# Patient Record
Sex: MALE | Race: WHITE | NOT HISPANIC OR LATINO | Employment: FULL TIME | URBAN - METROPOLITAN AREA
[De-identification: names, ages, dates, MRNs, and addresses within clinical notes are randomized per-mention and may not be internally consistent; named-entity substitution may affect disease eponyms.]

---

## 2017-08-22 ENCOUNTER — OFFICE VISIT (OUTPATIENT)
Dept: ONCOLOGY | Facility: CLINIC | Age: 52
End: 2017-08-22

## 2017-08-22 ENCOUNTER — APPOINTMENT (OUTPATIENT)
Dept: LAB | Facility: HOSPITAL | Age: 52
End: 2017-08-22

## 2017-08-22 VITALS
RESPIRATION RATE: 16 BRPM | WEIGHT: 171 LBS | BODY MASS INDEX: 25.33 KG/M2 | DIASTOLIC BLOOD PRESSURE: 80 MMHG | SYSTOLIC BLOOD PRESSURE: 124 MMHG | TEMPERATURE: 97.9 F | HEIGHT: 69 IN | HEART RATE: 70 BPM

## 2017-08-22 DIAGNOSIS — C21.0 SQUAMOUS CELL CANCER, ANUS (HCC): Primary | ICD-10-CM

## 2017-08-22 LAB
ALBUMIN SERPL-MCNC: 4.5 G/DL (ref 3.2–4.8)
ALBUMIN/GLOB SERPL: 1.8 G/DL (ref 1.5–2.5)
ALP SERPL-CCNC: 88 U/L (ref 25–100)
ALT SERPL W P-5'-P-CCNC: 10 U/L (ref 7–40)
ANION GAP SERPL CALCULATED.3IONS-SCNC: 11 MMOL/L (ref 3–11)
AST SERPL-CCNC: 16 U/L (ref 0–33)
BILIRUB SERPL-MCNC: 0.2 MG/DL (ref 0.3–1.2)
BUN BLD-MCNC: 13 MG/DL (ref 9–23)
BUN/CREAT SERPL: 13 (ref 7–25)
CALCIUM SPEC-SCNC: 9.5 MG/DL (ref 8.7–10.4)
CHLORIDE SERPL-SCNC: 111 MMOL/L (ref 99–109)
CO2 SERPL-SCNC: 19 MMOL/L (ref 20–31)
CREAT BLD-MCNC: 1 MG/DL (ref 0.6–1.3)
ERYTHROCYTE [DISTWIDTH] IN BLOOD BY AUTOMATED COUNT: 13.3 % (ref 11.3–14.5)
GFR SERPL CREATININE-BSD FRML MDRD: 78 ML/MIN/1.73
GLOBULIN UR ELPH-MCNC: 2.5 GM/DL
GLUCOSE BLD-MCNC: 82 MG/DL (ref 70–100)
HCT VFR BLD AUTO: 38.5 % (ref 38.9–50.9)
HGB BLD-MCNC: 12.6 G/DL (ref 13.1–17.5)
LYMPHOCYTES # BLD AUTO: 1.3 10*3/MM3 (ref 0.6–4.8)
LYMPHOCYTES NFR BLD AUTO: 27.3 % (ref 24–44)
MCH RBC QN AUTO: 29.8 PG (ref 27–31)
MCHC RBC AUTO-ENTMCNC: 32.7 G/DL (ref 32–36)
MCV RBC AUTO: 91.1 FL (ref 80–99)
MONOCYTES # BLD AUTO: 0.4 10*3/MM3 (ref 0–1)
MONOCYTES NFR BLD AUTO: 7.9 % (ref 0–12)
NEUTROPHILS # BLD AUTO: 3 10*3/MM3 (ref 1.5–8.3)
NEUTROPHILS NFR BLD AUTO: 64.8 % (ref 41–71)
PLATELET # BLD AUTO: 165 10*3/MM3 (ref 150–450)
PMV BLD AUTO: 7.6 FL (ref 6–12)
POTASSIUM BLD-SCNC: 3.7 MMOL/L (ref 3.5–5.5)
PROT SERPL-MCNC: 7 G/DL (ref 5.7–8.2)
RBC # BLD AUTO: 4.22 10*6/MM3 (ref 4.2–5.76)
SODIUM BLD-SCNC: 141 MMOL/L (ref 132–146)
WBC NRBC COR # BLD: 4.7 10*3/MM3 (ref 3.5–10.8)

## 2017-08-22 PROCEDURE — 80053 COMPREHEN METABOLIC PANEL: CPT | Performed by: INTERNAL MEDICINE

## 2017-08-22 PROCEDURE — 99213 OFFICE O/P EST LOW 20 MIN: CPT | Performed by: INTERNAL MEDICINE

## 2017-08-22 PROCEDURE — 36415 COLL VENOUS BLD VENIPUNCTURE: CPT | Performed by: INTERNAL MEDICINE

## 2017-08-22 PROCEDURE — 85025 COMPLETE CBC W/AUTO DIFF WBC: CPT | Performed by: INTERNAL MEDICINE

## 2017-08-22 NOTE — PROGRESS NOTES
"      PROBLEM LIST:  1. Squamous cell carcinoma of the anus:   a) The patient presented with 2 days of severe anal pain. He was found to have  a thrombosed hemorrhoid which was excised. At the time of followup he continued  to have a tender area as well as an ulcerated area and he had an exam under  anesthesia on 12/15/2014. A biopsy at that time showed a poorly differentiated  squamous cell carcinoma.   b) PET/CT on 01/02/2015 showed hypermetabolic mass at the anal verge but was  otherwise negative.   c) Chemoradiation with 5-FU and mitomycin was started on 01/08/2015.  Chemoradiation was completed on 02/13/2015.   2. Degenerative disc disease status post spine surgery.     Subjective     HISTORY OF PRESENT ILLNESS:   Arthur Recinos returns for follow-up.   Shortly after his last visit here he saw Dr. Mesfin rucker and had a procedure to remove some redundant tissue in the rectum.  Since then he has had some improvement in his symptoms although he still has very occasional bleeding or loose stools.  He has regained some weight.  Overall he is feeling well and has no new complaints or concerns.    Past Medical History, Past Surgical History, Social History, Family History have been reviewed and are without significant changes except as mentioned.    Review of Systems   A comprehensive 14 point review of systems was performed and was negative except as mentioned.    Medications:  The current medication list was reviewed in the EMR    ALLERGIES:    Allergies   Allergen Reactions   • Codeine Itching       Objective      /80  Pulse 70  Temp 97.9 °F (36.6 °C)  Resp 16  Ht 69\" (175.3 cm)  Wt 171 lb (77.6 kg)  BMI 25.25 kg/m2     Performance Status: 1    General: well appearing, in no acute distress  HEENT: sclera anicteric, oropharynx clear  Lymphatics: no cervical, supraclavicular, or axillary adenopathy  Cardiovascular: regular rate and rhythm, no murmurs  Lungs: clear to auscultation bilaterally  Abdomen: " soft, nontender, nondistended.  No palpable organomegaly  : External anal exam is unremarkable.  There is no ulceration or fissure.  Internal digital exam reveals no palpable mass.   Extremeties: no lower extremity edema  Skin: no rashes, lesions, bruising, or petechiae    RECENT LABS:  CBC and CMP today are unremarkable other than mild anemia with a hemoglobin of 12.6.          Assessment/Plan   Arthur Recinos is a 52 y.o. year old male with a history of stage II anal squamous cell carcinoma who completed chemoradiotherapy with a complete response.  He is doing well today with no evidence of disease recurrence.  I recommended that he follow-up with Dr. Toure at yearly for an exam.  I can see him yearly as well and we can space visits apart by about 6 months.    He has mild anemia which is likely residual from his chemotherapy.                  Visit time was 15 minutes, greater than 50% spent in counseling      Elizabeth Schulz MD  [facility]    8/22/2017          CC:

## 2017-08-26 ENCOUNTER — APPOINTMENT (OUTPATIENT)
Dept: CT IMAGING | Facility: HOSPITAL | Age: 52
End: 2017-08-26

## 2017-08-26 ENCOUNTER — HOSPITAL ENCOUNTER (INPATIENT)
Facility: HOSPITAL | Age: 52
LOS: 4 days | Discharge: HOME | End: 2017-08-30
Attending: EMERGENCY MEDICINE | Admitting: INTERNAL MEDICINE

## 2017-08-26 DIAGNOSIS — K56.60 INTESTINAL OBSTRUCTION, UNSPECIFIED TYPE: Primary | ICD-10-CM

## 2017-08-26 DIAGNOSIS — R10.9 INTRACTABLE ABDOMINAL PAIN: ICD-10-CM

## 2017-08-26 DIAGNOSIS — R10.84 GENERALIZED ABDOMINAL PAIN: ICD-10-CM

## 2017-08-26 PROBLEM — K56.609 SMALL BOWEL OBSTRUCTION: Status: ACTIVE | Noted: 2017-08-26

## 2017-08-26 PROBLEM — K56.609 INTESTINAL OBSTRUCTION: Status: ACTIVE | Noted: 2017-08-26

## 2017-08-26 PROBLEM — K56.50 INTESTINAL ADHESIONS WITH OBSTRUCTION: Status: ACTIVE | Noted: 2017-08-26

## 2017-08-26 LAB
ALBUMIN SERPL-MCNC: 4.2 G/DL (ref 3.2–4.8)
ALBUMIN/GLOB SERPL: 1.5 G/DL (ref 1.5–2.5)
ALP SERPL-CCNC: 82 U/L (ref 25–100)
ALT SERPL W P-5'-P-CCNC: 9 U/L (ref 7–40)
ANION GAP SERPL CALCULATED.3IONS-SCNC: 1 MMOL/L (ref 3–11)
AST SERPL-CCNC: 16 U/L (ref 0–33)
BACTERIA UR QL AUTO: ABNORMAL /HPF
BASOPHILS # BLD AUTO: 0.01 10*3/MM3 (ref 0–0.2)
BASOPHILS NFR BLD AUTO: 0.1 % (ref 0–1)
BILIRUB SERPL-MCNC: 0.4 MG/DL (ref 0.3–1.2)
BILIRUB UR QL STRIP: NEGATIVE
BUN BLD-MCNC: 11 MG/DL (ref 9–23)
BUN/CREAT SERPL: 11 (ref 7–25)
CALCIUM SPEC-SCNC: 9.8 MG/DL (ref 8.7–10.4)
CHLORIDE SERPL-SCNC: 110 MMOL/L (ref 99–109)
CLARITY UR: CLEAR
CO2 SERPL-SCNC: 28 MMOL/L (ref 20–31)
COLOR UR: YELLOW
CREAT BLD-MCNC: 1 MG/DL (ref 0.6–1.3)
DEPRECATED RDW RBC AUTO: 43 FL (ref 37–54)
EOSINOPHIL # BLD AUTO: 0.08 10*3/MM3 (ref 0–0.3)
EOSINOPHIL NFR BLD AUTO: 1.2 % (ref 0–3)
ERYTHROCYTE [DISTWIDTH] IN BLOOD BY AUTOMATED COUNT: 13.2 % (ref 11.3–14.5)
GFR SERPL CREATININE-BSD FRML MDRD: 78 ML/MIN/1.73
GLOBULIN UR ELPH-MCNC: 2.8 GM/DL
GLUCOSE BLD-MCNC: 90 MG/DL (ref 70–100)
GLUCOSE UR STRIP-MCNC: NEGATIVE MG/DL
HCT VFR BLD AUTO: 38.8 % (ref 38.9–50.9)
HGB BLD-MCNC: 13.5 G/DL (ref 13.1–17.5)
HGB UR QL STRIP.AUTO: ABNORMAL
HYALINE CASTS UR QL AUTO: ABNORMAL /LPF
IMM GRANULOCYTES # BLD: 0.02 10*3/MM3 (ref 0–0.03)
IMM GRANULOCYTES NFR BLD: 0.3 % (ref 0–0.6)
KETONES UR QL STRIP: NEGATIVE
LEUKOCYTE ESTERASE UR QL STRIP.AUTO: NEGATIVE
LIPASE SERPL-CCNC: 33 U/L (ref 6–51)
LYMPHOCYTES # BLD AUTO: 1.25 10*3/MM3 (ref 0.6–4.8)
LYMPHOCYTES NFR BLD AUTO: 18.7 % (ref 24–44)
MCH RBC QN AUTO: 31 PG (ref 27–31)
MCHC RBC AUTO-ENTMCNC: 34.8 G/DL (ref 32–36)
MCV RBC AUTO: 89 FL (ref 80–99)
MONOCYTES # BLD AUTO: 0.66 10*3/MM3 (ref 0–1)
MONOCYTES NFR BLD AUTO: 9.9 % (ref 0–12)
NEUTROPHILS # BLD AUTO: 4.65 10*3/MM3 (ref 1.5–8.3)
NEUTROPHILS NFR BLD AUTO: 69.8 % (ref 41–71)
NITRITE UR QL STRIP: NEGATIVE
PH UR STRIP.AUTO: 6 [PH] (ref 5–8)
PLATELET # BLD AUTO: 154 10*3/MM3 (ref 150–450)
PMV BLD AUTO: 10 FL (ref 6–12)
POTASSIUM BLD-SCNC: 4 MMOL/L (ref 3.5–5.5)
PROT SERPL-MCNC: 7 G/DL (ref 5.7–8.2)
PROT UR QL STRIP: NEGATIVE
RBC # BLD AUTO: 4.36 10*6/MM3 (ref 4.2–5.76)
RBC # UR: ABNORMAL /HPF
REF LAB TEST METHOD: ABNORMAL
SODIUM BLD-SCNC: 139 MMOL/L (ref 132–146)
SP GR UR STRIP: 1.01 (ref 1–1.03)
SQUAMOUS #/AREA URNS HPF: ABNORMAL /HPF
UROBILINOGEN UR QL STRIP: ABNORMAL
WBC NRBC COR # BLD: 6.67 10*3/MM3 (ref 3.5–10.8)
WBC UR QL AUTO: ABNORMAL /HPF

## 2017-08-26 PROCEDURE — 80053 COMPREHEN METABOLIC PANEL: CPT | Performed by: EMERGENCY MEDICINE

## 2017-08-26 PROCEDURE — 25010000002 HYDROMORPHONE PER 4 MG: Performed by: NURSE PRACTITIONER

## 2017-08-26 PROCEDURE — 25010000002 HYDROMORPHONE PER 4 MG: Performed by: EMERGENCY MEDICINE

## 2017-08-26 PROCEDURE — 74177 CT ABD & PELVIS W/CONTRAST: CPT

## 2017-08-26 PROCEDURE — 85025 COMPLETE CBC W/AUTO DIFF WBC: CPT | Performed by: EMERGENCY MEDICINE

## 2017-08-26 PROCEDURE — 0 IOPAMIDOL 61 % SOLUTION: Performed by: EMERGENCY MEDICINE

## 2017-08-26 PROCEDURE — 25010000002 HEPARIN (PORCINE) PER 1000 UNITS: Performed by: NURSE PRACTITIONER

## 2017-08-26 PROCEDURE — 83690 ASSAY OF LIPASE: CPT | Performed by: EMERGENCY MEDICINE

## 2017-08-26 PROCEDURE — 99285 EMERGENCY DEPT VISIT HI MDM: CPT

## 2017-08-26 PROCEDURE — 81001 URINALYSIS AUTO W/SCOPE: CPT | Performed by: EMERGENCY MEDICINE

## 2017-08-26 PROCEDURE — 25010000002 ONDANSETRON PER 1 MG: Performed by: EMERGENCY MEDICINE

## 2017-08-26 PROCEDURE — 99223 1ST HOSP IP/OBS HIGH 75: CPT | Performed by: FAMILY MEDICINE

## 2017-08-26 PROCEDURE — 25010000002 ONDANSETRON PER 1 MG: Performed by: NURSE PRACTITIONER

## 2017-08-26 RX ORDER — SODIUM CHLORIDE 0.9 % (FLUSH) 0.9 %
1-10 SYRINGE (ML) INJECTION AS NEEDED
Status: DISCONTINUED | OUTPATIENT
Start: 2017-08-26 | End: 2017-08-28

## 2017-08-26 RX ORDER — NALOXONE HCL 0.4 MG/ML
0.4 VIAL (ML) INJECTION
Status: DISCONTINUED | OUTPATIENT
Start: 2017-08-26 | End: 2017-08-31 | Stop reason: HOSPADM

## 2017-08-26 RX ORDER — HYDROMORPHONE HYDROCHLORIDE 1 MG/ML
0.5 INJECTION, SOLUTION INTRAMUSCULAR; INTRAVENOUS; SUBCUTANEOUS ONCE
Status: COMPLETED | OUTPATIENT
Start: 2017-08-26 | End: 2017-08-26

## 2017-08-26 RX ORDER — HYDROMORPHONE HYDROCHLORIDE 1 MG/ML
0.5 INJECTION, SOLUTION INTRAMUSCULAR; INTRAVENOUS; SUBCUTANEOUS ONCE
Status: DISCONTINUED | OUTPATIENT
Start: 2017-08-26 | End: 2017-08-26

## 2017-08-26 RX ORDER — DOCUSATE SODIUM 100 MG/1
100 CAPSULE, LIQUID FILLED ORAL 2 TIMES DAILY
Status: DISCONTINUED | OUTPATIENT
Start: 2017-08-26 | End: 2017-08-27

## 2017-08-26 RX ORDER — FAMOTIDINE 20 MG/1
20 TABLET, FILM COATED ORAL 2 TIMES DAILY
Status: DISCONTINUED | OUTPATIENT
Start: 2017-08-26 | End: 2017-08-27

## 2017-08-26 RX ORDER — ONDANSETRON 2 MG/ML
4 INJECTION INTRAMUSCULAR; INTRAVENOUS ONCE
Status: COMPLETED | OUTPATIENT
Start: 2017-08-26 | End: 2017-08-26

## 2017-08-26 RX ORDER — SODIUM CHLORIDE 9 MG/ML
150 INJECTION, SOLUTION INTRAVENOUS CONTINUOUS
Status: DISCONTINUED | OUTPATIENT
Start: 2017-08-26 | End: 2017-08-27

## 2017-08-26 RX ORDER — ONDANSETRON 2 MG/ML
4 INJECTION INTRAMUSCULAR; INTRAVENOUS EVERY 6 HOURS PRN
Status: DISCONTINUED | OUTPATIENT
Start: 2017-08-26 | End: 2017-08-31 | Stop reason: HOSPADM

## 2017-08-26 RX ORDER — SODIUM CHLORIDE 0.9 % (FLUSH) 0.9 %
1-10 SYRINGE (ML) INJECTION AS NEEDED
Status: DISCONTINUED | OUTPATIENT
Start: 2017-08-26 | End: 2017-08-31 | Stop reason: HOSPADM

## 2017-08-26 RX ORDER — HYDROMORPHONE HYDROCHLORIDE 1 MG/ML
0.5 INJECTION, SOLUTION INTRAMUSCULAR; INTRAVENOUS; SUBCUTANEOUS
Status: DISCONTINUED | OUTPATIENT
Start: 2017-08-26 | End: 2017-08-31 | Stop reason: HOSPADM

## 2017-08-26 RX ORDER — SODIUM CHLORIDE 0.9 % (FLUSH) 0.9 %
10 SYRINGE (ML) INJECTION AS NEEDED
Status: DISCONTINUED | OUTPATIENT
Start: 2017-08-26 | End: 2017-08-31 | Stop reason: HOSPADM

## 2017-08-26 RX ORDER — ONDANSETRON 4 MG/1
4 TABLET, FILM COATED ORAL EVERY 6 HOURS PRN
Status: DISCONTINUED | OUTPATIENT
Start: 2017-08-26 | End: 2017-08-31 | Stop reason: HOSPADM

## 2017-08-26 RX ORDER — ACETAMINOPHEN 325 MG/1
650 TABLET ORAL EVERY 6 HOURS PRN
Status: DISCONTINUED | OUTPATIENT
Start: 2017-08-26 | End: 2017-08-31 | Stop reason: HOSPADM

## 2017-08-26 RX ORDER — ONDANSETRON 2 MG/ML
4 INJECTION INTRAMUSCULAR; INTRAVENOUS ONCE
Status: DISCONTINUED | OUTPATIENT
Start: 2017-08-26 | End: 2017-08-26

## 2017-08-26 RX ORDER — HEPARIN SODIUM 5000 [USP'U]/ML
5000 INJECTION, SOLUTION INTRAVENOUS; SUBCUTANEOUS EVERY 12 HOURS SCHEDULED
Status: DISCONTINUED | OUTPATIENT
Start: 2017-08-26 | End: 2017-08-31 | Stop reason: HOSPADM

## 2017-08-26 RX ADMIN — FAMOTIDINE 20 MG: 20 TABLET ORAL at 22:10

## 2017-08-26 RX ADMIN — ONDANSETRON 4 MG: 2 INJECTION INTRAMUSCULAR; INTRAVENOUS at 17:32

## 2017-08-26 RX ADMIN — HYDROMORPHONE HYDROCHLORIDE 0.5 MG: 1 INJECTION, SOLUTION INTRAMUSCULAR; INTRAVENOUS; SUBCUTANEOUS at 17:32

## 2017-08-26 RX ADMIN — DOCUSATE SODIUM 100 MG: 100 CAPSULE, LIQUID FILLED ORAL at 22:10

## 2017-08-26 RX ADMIN — HYDROMORPHONE HYDROCHLORIDE 0.5 MG: 1 INJECTION, SOLUTION INTRAMUSCULAR; INTRAVENOUS; SUBCUTANEOUS at 19:23

## 2017-08-26 RX ADMIN — ONDANSETRON 4 MG: 2 INJECTION INTRAMUSCULAR; INTRAVENOUS at 14:13

## 2017-08-26 RX ADMIN — ONDANSETRON 4 MG: 2 INJECTION INTRAMUSCULAR; INTRAVENOUS at 21:19

## 2017-08-26 RX ADMIN — HYDROMORPHONE HYDROCHLORIDE 0.5 MG: 1 INJECTION, SOLUTION INTRAMUSCULAR; INTRAVENOUS; SUBCUTANEOUS at 22:40

## 2017-08-26 RX ADMIN — HYDROMORPHONE HYDROCHLORIDE 0.5 MG: 1 INJECTION, SOLUTION INTRAMUSCULAR; INTRAVENOUS; SUBCUTANEOUS at 14:19

## 2017-08-26 RX ADMIN — HYDROMORPHONE HYDROCHLORIDE 1 MG: 1 INJECTION, SOLUTION INTRAMUSCULAR; INTRAVENOUS; SUBCUTANEOUS at 15:04

## 2017-08-26 RX ADMIN — SODIUM CHLORIDE 1000 ML: 9 INJECTION, SOLUTION INTRAVENOUS at 14:10

## 2017-08-26 RX ADMIN — SODIUM CHLORIDE 75 ML/HR: 9 INJECTION, SOLUTION INTRAVENOUS at 22:30

## 2017-08-26 RX ADMIN — IOPAMIDOL 95 ML: 612 INJECTION, SOLUTION INTRAVENOUS at 15:34

## 2017-08-26 RX ADMIN — ACETAMINOPHEN 650 MG: 325 TABLET, FILM COATED ORAL at 22:40

## 2017-08-26 RX ADMIN — HEPARIN SODIUM 5000 UNITS: 5000 INJECTION, SOLUTION INTRAVENOUS; SUBCUTANEOUS at 21:19

## 2017-08-27 ENCOUNTER — APPOINTMENT (OUTPATIENT)
Dept: CT IMAGING | Facility: HOSPITAL | Age: 52
End: 2017-08-27

## 2017-08-27 LAB
ALBUMIN SERPL-MCNC: 3.5 G/DL (ref 3.2–4.8)
ALBUMIN/GLOB SERPL: 1.5 G/DL (ref 1.5–2.5)
ALP SERPL-CCNC: 64 U/L (ref 25–100)
ALT SERPL W P-5'-P-CCNC: 6 U/L (ref 7–40)
ANION GAP SERPL CALCULATED.3IONS-SCNC: 0 MMOL/L (ref 3–11)
APTT PPP: 30.3 SECONDS (ref 24–31)
AST SERPL-CCNC: 15 U/L (ref 0–33)
BASOPHILS # BLD AUTO: 0.01 10*3/MM3 (ref 0–0.2)
BASOPHILS NFR BLD AUTO: 0.3 % (ref 0–1)
BILIRUB SERPL-MCNC: 0.4 MG/DL (ref 0.3–1.2)
BUN BLD-MCNC: 9 MG/DL (ref 9–23)
BUN/CREAT SERPL: 9 (ref 7–25)
CALCIUM SPEC-SCNC: 8.1 MG/DL (ref 8.7–10.4)
CHLORIDE SERPL-SCNC: 112 MMOL/L (ref 99–109)
CO2 SERPL-SCNC: 27 MMOL/L (ref 20–31)
CORTIS AM PEAK SERPL-MCNC: 10.1 MCG/DL (ref 4.3–22.4)
CREAT BLD-MCNC: 1 MG/DL (ref 0.6–1.3)
D-LACTATE SERPL-SCNC: 0.5 MMOL/L (ref 0.5–2)
D-LACTATE SERPL-SCNC: 0.6 MMOL/L (ref 0.5–2)
DEPRECATED RDW RBC AUTO: 44.6 FL (ref 37–54)
EOSINOPHIL # BLD AUTO: 0.08 10*3/MM3 (ref 0–0.3)
EOSINOPHIL NFR BLD AUTO: 2.4 % (ref 0–3)
ERYTHROCYTE [DISTWIDTH] IN BLOOD BY AUTOMATED COUNT: 13.5 % (ref 11.3–14.5)
GFR SERPL CREATININE-BSD FRML MDRD: 78 ML/MIN/1.73
GLOBULIN UR ELPH-MCNC: 2.3 GM/DL
GLUCOSE BLD-MCNC: 82 MG/DL (ref 70–100)
HCT VFR BLD AUTO: 34.2 % (ref 38.9–50.9)
HGB BLD-MCNC: 11.7 G/DL (ref 13.1–17.5)
IMM GRANULOCYTES # BLD: 0.01 10*3/MM3 (ref 0–0.03)
IMM GRANULOCYTES NFR BLD: 0.3 % (ref 0–0.6)
INR PPP: 1
LYMPHOCYTES # BLD AUTO: 1.01 10*3/MM3 (ref 0.6–4.8)
LYMPHOCYTES NFR BLD AUTO: 30.8 % (ref 24–44)
MAGNESIUM SERPL-MCNC: 1.9 MG/DL (ref 1.3–2.7)
MCH RBC QN AUTO: 31.1 PG (ref 27–31)
MCHC RBC AUTO-ENTMCNC: 34.2 G/DL (ref 32–36)
MCV RBC AUTO: 91 FL (ref 80–99)
MONOCYTES # BLD AUTO: 0.39 10*3/MM3 (ref 0–1)
MONOCYTES NFR BLD AUTO: 11.9 % (ref 0–12)
NEUTROPHILS # BLD AUTO: 1.78 10*3/MM3 (ref 1.5–8.3)
NEUTROPHILS NFR BLD AUTO: 54.3 % (ref 41–71)
PLATELET # BLD AUTO: 118 10*3/MM3 (ref 150–450)
PMV BLD AUTO: 10.3 FL (ref 6–12)
POTASSIUM BLD-SCNC: 3.9 MMOL/L (ref 3.5–5.5)
PROCALCITONIN SERPL-MCNC: <0.05 NG/ML
PROT SERPL-MCNC: 5.8 G/DL (ref 5.7–8.2)
PROTHROMBIN TIME: 10.9 SECONDS (ref 9.6–11.5)
RBC # BLD AUTO: 3.76 10*6/MM3 (ref 4.2–5.76)
SODIUM BLD-SCNC: 139 MMOL/L (ref 132–146)
TROPONIN I SERPL-MCNC: <0.006 NG/ML
WBC NRBC COR # BLD: 3.28 10*3/MM3 (ref 3.5–10.8)

## 2017-08-27 PROCEDURE — 25010000002 HEPARIN (PORCINE) PER 1000 UNITS: Performed by: NURSE PRACTITIONER

## 2017-08-27 PROCEDURE — 85610 PROTHROMBIN TIME: CPT | Performed by: INTERNAL MEDICINE

## 2017-08-27 PROCEDURE — 25010000002 ONDANSETRON PER 1 MG: Performed by: INTERNAL MEDICINE

## 2017-08-27 PROCEDURE — 85730 THROMBOPLASTIN TIME PARTIAL: CPT | Performed by: INTERNAL MEDICINE

## 2017-08-27 PROCEDURE — 83605 ASSAY OF LACTIC ACID: CPT | Performed by: NURSE PRACTITIONER

## 2017-08-27 PROCEDURE — 84145 PROCALCITONIN (PCT): CPT | Performed by: NURSE PRACTITIONER

## 2017-08-27 PROCEDURE — 80053 COMPREHEN METABOLIC PANEL: CPT | Performed by: INTERNAL MEDICINE

## 2017-08-27 PROCEDURE — 25010000002 DEXAMETHASONE PER 1 MG: Performed by: NURSE PRACTITIONER

## 2017-08-27 PROCEDURE — 85025 COMPLETE CBC W/AUTO DIFF WBC: CPT | Performed by: INTERNAL MEDICINE

## 2017-08-27 PROCEDURE — 99233 SBSQ HOSP IP/OBS HIGH 50: CPT | Performed by: INTERNAL MEDICINE

## 2017-08-27 PROCEDURE — 74176 CT ABD & PELVIS W/O CONTRAST: CPT

## 2017-08-27 PROCEDURE — 84484 ASSAY OF TROPONIN QUANT: CPT | Performed by: NURSE PRACTITIONER

## 2017-08-27 PROCEDURE — 82533 TOTAL CORTISOL: CPT | Performed by: NURSE PRACTITIONER

## 2017-08-27 PROCEDURE — 93005 ELECTROCARDIOGRAM TRACING: CPT | Performed by: NURSE PRACTITIONER

## 2017-08-27 PROCEDURE — 83735 ASSAY OF MAGNESIUM: CPT | Performed by: NURSE PRACTITIONER

## 2017-08-27 PROCEDURE — 25010000002 HYDROMORPHONE PER 4 MG: Performed by: NURSE PRACTITIONER

## 2017-08-27 PROCEDURE — 93010 ELECTROCARDIOGRAM REPORT: CPT | Performed by: INTERNAL MEDICINE

## 2017-08-27 PROCEDURE — 99291 CRITICAL CARE FIRST HOUR: CPT | Performed by: NURSE PRACTITIONER

## 2017-08-27 RX ORDER — DEXAMETHASONE SODIUM PHOSPHATE 4 MG/ML
4 INJECTION, SOLUTION INTRA-ARTICULAR; INTRALESIONAL; INTRAMUSCULAR; INTRAVENOUS; SOFT TISSUE ONCE
Status: COMPLETED | OUTPATIENT
Start: 2017-08-27 | End: 2017-08-27

## 2017-08-27 RX ORDER — FAMOTIDINE 10 MG/ML
20 INJECTION, SOLUTION INTRAVENOUS DAILY
Status: DISCONTINUED | OUTPATIENT
Start: 2017-08-28 | End: 2017-08-31 | Stop reason: HOSPADM

## 2017-08-27 RX ORDER — DEXTROSE, SODIUM CHLORIDE, AND POTASSIUM CHLORIDE 5; .45; .15 G/100ML; G/100ML; G/100ML
125 INJECTION INTRAVENOUS CONTINUOUS
Status: DISCONTINUED | OUTPATIENT
Start: 2017-08-27 | End: 2017-08-31 | Stop reason: HOSPADM

## 2017-08-27 RX ORDER — PROMETHAZINE HYDROCHLORIDE 25 MG/ML
12.5 INJECTION, SOLUTION INTRAMUSCULAR; INTRAVENOUS EVERY 6 HOURS PRN
Status: DISCONTINUED | OUTPATIENT
Start: 2017-08-27 | End: 2017-08-31 | Stop reason: HOSPADM

## 2017-08-27 RX ADMIN — SODIUM CHLORIDE 500 ML: 9 INJECTION, SOLUTION INTRAVENOUS at 21:47

## 2017-08-27 RX ADMIN — HEPARIN SODIUM 5000 UNITS: 5000 INJECTION, SOLUTION INTRAVENOUS; SUBCUTANEOUS at 21:08

## 2017-08-27 RX ADMIN — SODIUM CHLORIDE 75 ML/HR: 9 INJECTION, SOLUTION INTRAVENOUS at 03:19

## 2017-08-27 RX ADMIN — FAMOTIDINE 20 MG: 20 TABLET ORAL at 17:18

## 2017-08-27 RX ADMIN — FAMOTIDINE 20 MG: 20 TABLET ORAL at 07:52

## 2017-08-27 RX ADMIN — HEPARIN SODIUM 5000 UNITS: 5000 INJECTION, SOLUTION INTRAVENOUS; SUBCUTANEOUS at 07:52

## 2017-08-27 RX ADMIN — DOCUSATE SODIUM 100 MG: 100 CAPSULE, LIQUID FILLED ORAL at 17:18

## 2017-08-27 RX ADMIN — HYDROMORPHONE HYDROCHLORIDE 0.5 MG: 1 INJECTION, SOLUTION INTRAMUSCULAR; INTRAVENOUS; SUBCUTANEOUS at 07:52

## 2017-08-27 RX ADMIN — ONDANSETRON 4 MG: 2 INJECTION INTRAMUSCULAR; INTRAVENOUS at 16:43

## 2017-08-27 RX ADMIN — DEXAMETHASONE SODIUM PHOSPHATE 4 MG: 4 INJECTION, SOLUTION INTRA-ARTICULAR; INTRALESIONAL; INTRAMUSCULAR; INTRAVENOUS; SOFT TISSUE at 06:38

## 2017-08-27 RX ADMIN — ONDANSETRON 4 MG: 2 INJECTION INTRAMUSCULAR; INTRAVENOUS at 09:11

## 2017-08-27 RX ADMIN — SODIUM CHLORIDE 500 ML: 9 INJECTION, SOLUTION INTRAVENOUS at 07:54

## 2017-08-27 RX ADMIN — HYDROMORPHONE HYDROCHLORIDE 0.5 MG: 1 INJECTION, SOLUTION INTRAMUSCULAR; INTRAVENOUS; SUBCUTANEOUS at 19:29

## 2017-08-27 RX ADMIN — HYDROMORPHONE HYDROCHLORIDE 0.5 MG: 1 INJECTION, SOLUTION INTRAMUSCULAR; INTRAVENOUS; SUBCUTANEOUS at 17:18

## 2017-08-27 RX ADMIN — POTASSIUM CHLORIDE, DEXTROSE MONOHYDRATE AND SODIUM CHLORIDE 125 ML/HR: 150; 5; 450 INJECTION, SOLUTION INTRAVENOUS at 19:29

## 2017-08-27 RX ADMIN — HYDROMORPHONE HYDROCHLORIDE 0.5 MG: 1 INJECTION, SOLUTION INTRAMUSCULAR; INTRAVENOUS; SUBCUTANEOUS at 11:00

## 2017-08-27 RX ADMIN — HYDROMORPHONE HYDROCHLORIDE 0.5 MG: 1 INJECTION, SOLUTION INTRAMUSCULAR; INTRAVENOUS; SUBCUTANEOUS at 13:12

## 2017-08-27 RX ADMIN — SODIUM CHLORIDE 500 ML: 9 INJECTION, SOLUTION INTRAVENOUS at 03:00

## 2017-08-27 RX ADMIN — DOCUSATE SODIUM 100 MG: 100 CAPSULE, LIQUID FILLED ORAL at 07:52

## 2017-08-27 RX ADMIN — POTASSIUM CHLORIDE, DEXTROSE MONOHYDRATE AND SODIUM CHLORIDE 125 ML/HR: 150; 5; 450 INJECTION, SOLUTION INTRAVENOUS at 12:12

## 2017-08-28 ENCOUNTER — APPOINTMENT (OUTPATIENT)
Dept: GENERAL RADIOLOGY | Facility: HOSPITAL | Age: 52
End: 2017-08-28

## 2017-08-28 PROBLEM — R00.1 BRADYCARDIA: Status: ACTIVE | Noted: 2017-08-28

## 2017-08-28 LAB
ANION GAP SERPL CALCULATED.3IONS-SCNC: 4 MMOL/L (ref 3–11)
BASOPHILS # BLD AUTO: 0.01 10*3/MM3 (ref 0–0.2)
BASOPHILS NFR BLD AUTO: 0.2 % (ref 0–1)
BUN BLD-MCNC: 9 MG/DL (ref 9–23)
BUN/CREAT SERPL: 10 (ref 7–25)
CALCIUM SPEC-SCNC: 8.7 MG/DL (ref 8.7–10.4)
CHLORIDE SERPL-SCNC: 110 MMOL/L (ref 99–109)
CO2 SERPL-SCNC: 27 MMOL/L (ref 20–31)
CREAT BLD-MCNC: 0.9 MG/DL (ref 0.6–1.3)
DEPRECATED RDW RBC AUTO: 42.9 FL (ref 37–54)
EOSINOPHIL # BLD AUTO: 0.02 10*3/MM3 (ref 0–0.3)
EOSINOPHIL NFR BLD AUTO: 0.3 % (ref 0–3)
ERYTHROCYTE [DISTWIDTH] IN BLOOD BY AUTOMATED COUNT: 13.1 % (ref 11.3–14.5)
GFR SERPL CREATININE-BSD FRML MDRD: 89 ML/MIN/1.73
GLUCOSE BLD-MCNC: 84 MG/DL (ref 70–100)
HCT VFR BLD AUTO: 35.2 % (ref 38.9–50.9)
HGB BLD-MCNC: 12.1 G/DL (ref 13.1–17.5)
IMM GRANULOCYTES # BLD: 0.01 10*3/MM3 (ref 0–0.03)
IMM GRANULOCYTES NFR BLD: 0.2 % (ref 0–0.6)
LYMPHOCYTES # BLD AUTO: 1.05 10*3/MM3 (ref 0.6–4.8)
LYMPHOCYTES NFR BLD AUTO: 16.3 % (ref 24–44)
MCH RBC QN AUTO: 30.8 PG (ref 27–31)
MCHC RBC AUTO-ENTMCNC: 34.4 G/DL (ref 32–36)
MCV RBC AUTO: 89.6 FL (ref 80–99)
MONOCYTES # BLD AUTO: 0.62 10*3/MM3 (ref 0–1)
MONOCYTES NFR BLD AUTO: 9.6 % (ref 0–12)
NEUTROPHILS # BLD AUTO: 4.74 10*3/MM3 (ref 1.5–8.3)
NEUTROPHILS NFR BLD AUTO: 73.4 % (ref 41–71)
PLATELET # BLD AUTO: 128 10*3/MM3 (ref 150–450)
PMV BLD AUTO: 10.4 FL (ref 6–12)
POTASSIUM BLD-SCNC: 4.1 MMOL/L (ref 3.5–5.5)
RBC # BLD AUTO: 3.93 10*6/MM3 (ref 4.2–5.76)
SODIUM BLD-SCNC: 141 MMOL/L (ref 132–146)
TSH SERPL DL<=0.05 MIU/L-ACNC: 2.46 MIU/ML (ref 0.35–5.35)
WBC NRBC COR # BLD: 6.45 10*3/MM3 (ref 3.5–10.8)

## 2017-08-28 PROCEDURE — 85025 COMPLETE CBC W/AUTO DIFF WBC: CPT | Performed by: INTERNAL MEDICINE

## 2017-08-28 PROCEDURE — 74000 HC ABDOMEN KUB: CPT

## 2017-08-28 PROCEDURE — 25010000002 HEPARIN (PORCINE) PER 1000 UNITS: Performed by: NURSE PRACTITIONER

## 2017-08-28 PROCEDURE — 25010000002 ONDANSETRON PER 1 MG: Performed by: INTERNAL MEDICINE

## 2017-08-28 PROCEDURE — 25010000002 HYDROMORPHONE PER 4 MG: Performed by: NURSE PRACTITIONER

## 2017-08-28 PROCEDURE — 80048 BASIC METABOLIC PNL TOTAL CA: CPT | Performed by: INTERNAL MEDICINE

## 2017-08-28 PROCEDURE — 84443 ASSAY THYROID STIM HORMONE: CPT | Performed by: INTERNAL MEDICINE

## 2017-08-28 PROCEDURE — 99233 SBSQ HOSP IP/OBS HIGH 50: CPT | Performed by: INTERNAL MEDICINE

## 2017-08-28 PROCEDURE — 25010000002 PROMETHAZINE PER 50 MG: Performed by: INTERNAL MEDICINE

## 2017-08-28 RX ADMIN — ONDANSETRON 4 MG: 2 INJECTION INTRAMUSCULAR; INTRAVENOUS at 20:19

## 2017-08-28 RX ADMIN — ONDANSETRON 4 MG: 2 INJECTION INTRAMUSCULAR; INTRAVENOUS at 00:01

## 2017-08-28 RX ADMIN — HYDROMORPHONE HYDROCHLORIDE 0.5 MG: 1 INJECTION, SOLUTION INTRAMUSCULAR; INTRAVENOUS; SUBCUTANEOUS at 08:19

## 2017-08-28 RX ADMIN — HYDROMORPHONE HYDROCHLORIDE 0.5 MG: 1 INJECTION, SOLUTION INTRAMUSCULAR; INTRAVENOUS; SUBCUTANEOUS at 00:01

## 2017-08-28 RX ADMIN — POTASSIUM CHLORIDE, DEXTROSE MONOHYDRATE AND SODIUM CHLORIDE 125 ML/HR: 150; 5; 450 INJECTION, SOLUTION INTRAVENOUS at 21:37

## 2017-08-28 RX ADMIN — HEPARIN SODIUM 5000 UNITS: 5000 INJECTION, SOLUTION INTRAVENOUS; SUBCUTANEOUS at 08:19

## 2017-08-28 RX ADMIN — PROMETHAZINE HYDROCHLORIDE 12.5 MG: 25 INJECTION INTRAMUSCULAR; INTRAVENOUS at 10:19

## 2017-08-28 RX ADMIN — FAMOTIDINE 20 MG: 10 INJECTION, SOLUTION INTRAVENOUS at 08:19

## 2017-08-28 RX ADMIN — ONDANSETRON 4 MG: 2 INJECTION INTRAMUSCULAR; INTRAVENOUS at 05:55

## 2017-08-28 RX ADMIN — HYDROMORPHONE HYDROCHLORIDE 0.5 MG: 1 INJECTION, SOLUTION INTRAMUSCULAR; INTRAVENOUS; SUBCUTANEOUS at 18:29

## 2017-08-28 RX ADMIN — HYDROMORPHONE HYDROCHLORIDE 0.5 MG: 1 INJECTION, SOLUTION INTRAMUSCULAR; INTRAVENOUS; SUBCUTANEOUS at 14:12

## 2017-08-28 RX ADMIN — HYDROMORPHONE HYDROCHLORIDE 0.5 MG: 1 INJECTION, SOLUTION INTRAMUSCULAR; INTRAVENOUS; SUBCUTANEOUS at 20:21

## 2017-08-28 RX ADMIN — HEPARIN SODIUM 5000 UNITS: 5000 INJECTION, SOLUTION INTRAVENOUS; SUBCUTANEOUS at 20:18

## 2017-08-28 RX ADMIN — HYDROMORPHONE HYDROCHLORIDE 0.5 MG: 1 INJECTION, SOLUTION INTRAMUSCULAR; INTRAVENOUS; SUBCUTANEOUS at 04:10

## 2017-08-28 RX ADMIN — POTASSIUM CHLORIDE, DEXTROSE MONOHYDRATE AND SODIUM CHLORIDE 125 ML/HR: 150; 5; 450 INJECTION, SOLUTION INTRAVENOUS at 11:24

## 2017-08-28 RX ADMIN — PROMETHAZINE HYDROCHLORIDE 12.5 MG: 25 INJECTION INTRAMUSCULAR; INTRAVENOUS at 16:15

## 2017-08-29 PROCEDURE — 25010000002 ONDANSETRON PER 1 MG: Performed by: INTERNAL MEDICINE

## 2017-08-29 PROCEDURE — 99232 SBSQ HOSP IP/OBS MODERATE 35: CPT | Performed by: NURSE PRACTITIONER

## 2017-08-29 PROCEDURE — 25010000002 PROMETHAZINE PER 50 MG: Performed by: INTERNAL MEDICINE

## 2017-08-29 PROCEDURE — 25010000002 HEPARIN (PORCINE) PER 1000 UNITS: Performed by: NURSE PRACTITIONER

## 2017-08-29 PROCEDURE — 25010000002 HYDROMORPHONE PER 4 MG: Performed by: NURSE PRACTITIONER

## 2017-08-29 RX ADMIN — POTASSIUM CHLORIDE, DEXTROSE MONOHYDRATE AND SODIUM CHLORIDE 125 ML/HR: 150; 5; 450 INJECTION, SOLUTION INTRAVENOUS at 15:25

## 2017-08-29 RX ADMIN — Medication: at 10:59

## 2017-08-29 RX ADMIN — ONDANSETRON 4 MG: 2 INJECTION INTRAMUSCULAR; INTRAVENOUS at 04:25

## 2017-08-29 RX ADMIN — HYDROMORPHONE HYDROCHLORIDE 0.5 MG: 1 INJECTION, SOLUTION INTRAMUSCULAR; INTRAVENOUS; SUBCUTANEOUS at 12:47

## 2017-08-29 RX ADMIN — HEPARIN SODIUM 5000 UNITS: 5000 INJECTION, SOLUTION INTRAVENOUS; SUBCUTANEOUS at 20:22

## 2017-08-29 RX ADMIN — ONDANSETRON 4 MG: 2 INJECTION INTRAMUSCULAR; INTRAVENOUS at 16:21

## 2017-08-29 RX ADMIN — HEPARIN SODIUM 5000 UNITS: 5000 INJECTION, SOLUTION INTRAVENOUS; SUBCUTANEOUS at 08:24

## 2017-08-29 RX ADMIN — HYDROMORPHONE HYDROCHLORIDE 0.5 MG: 1 INJECTION, SOLUTION INTRAMUSCULAR; INTRAVENOUS; SUBCUTANEOUS at 08:45

## 2017-08-29 RX ADMIN — HYDROMORPHONE HYDROCHLORIDE 0.5 MG: 1 INJECTION, SOLUTION INTRAMUSCULAR; INTRAVENOUS; SUBCUTANEOUS at 04:25

## 2017-08-29 RX ADMIN — FAMOTIDINE 20 MG: 10 INJECTION, SOLUTION INTRAVENOUS at 08:24

## 2017-08-29 RX ADMIN — PROMETHAZINE HYDROCHLORIDE 12.5 MG: 25 INJECTION INTRAMUSCULAR; INTRAVENOUS at 20:04

## 2017-08-29 RX ADMIN — PROMETHAZINE HYDROCHLORIDE 12.5 MG: 25 INJECTION INTRAMUSCULAR; INTRAVENOUS at 10:59

## 2017-08-29 RX ADMIN — SODIUM CHLORIDE 500 ML: 9 INJECTION, SOLUTION INTRAVENOUS at 00:30

## 2017-08-29 RX ADMIN — HYDROMORPHONE HYDROCHLORIDE 0.5 MG: 1 INJECTION, SOLUTION INTRAMUSCULAR; INTRAVENOUS; SUBCUTANEOUS at 20:21

## 2017-08-29 RX ADMIN — HYDROMORPHONE HYDROCHLORIDE 0.5 MG: 1 INJECTION, SOLUTION INTRAMUSCULAR; INTRAVENOUS; SUBCUTANEOUS at 17:30

## 2017-08-30 ENCOUNTER — APPOINTMENT (OUTPATIENT)
Dept: GENERAL RADIOLOGY | Facility: HOSPITAL | Age: 52
End: 2017-08-30
Attending: COLON & RECTAL SURGERY

## 2017-08-30 VITALS
HEART RATE: 51 BPM | TEMPERATURE: 97.7 F | SYSTOLIC BLOOD PRESSURE: 109 MMHG | BODY MASS INDEX: 25.09 KG/M2 | HEIGHT: 69 IN | OXYGEN SATURATION: 98 % | WEIGHT: 169.4 LBS | RESPIRATION RATE: 16 BRPM | DIASTOLIC BLOOD PRESSURE: 80 MMHG

## 2017-08-30 PROBLEM — K52.9 GASTROENTERITIS: Status: ACTIVE | Noted: 2017-08-26

## 2017-08-30 LAB
CORTIS SERPL-MCNC: 16 MCG/DL
CORTIS SERPL-MCNC: 2.8 MCG/DL
CORTIS SERPL-MCNC: 23.5 MCG/DL
CORTIS SERPL-MCNC: 6.8 MCG/DL

## 2017-08-30 PROCEDURE — 25010000002 COSYNTROPIN PER 0.25 MG: Performed by: INTERNAL MEDICINE

## 2017-08-30 PROCEDURE — 82024 ASSAY OF ACTH: CPT | Performed by: INTERNAL MEDICINE

## 2017-08-30 PROCEDURE — 25010000002 HYDROMORPHONE PER 4 MG: Performed by: NURSE PRACTITIONER

## 2017-08-30 PROCEDURE — 0 DIATRIZOATE MEGLUMINE & SODIUM PER 1 ML: Performed by: INTERNAL MEDICINE

## 2017-08-30 PROCEDURE — 82533 TOTAL CORTISOL: CPT | Performed by: NURSE PRACTITIONER

## 2017-08-30 PROCEDURE — 25010000002 PROMETHAZINE PER 50 MG: Performed by: INTERNAL MEDICINE

## 2017-08-30 PROCEDURE — 74250 X-RAY XM SM INT 1CNTRST STD: CPT

## 2017-08-30 PROCEDURE — 25010000002 ONDANSETRON PER 1 MG: Performed by: INTERNAL MEDICINE

## 2017-08-30 PROCEDURE — 25010000002 HEPARIN (PORCINE) PER 1000 UNITS: Performed by: NURSE PRACTITIONER

## 2017-08-30 PROCEDURE — 99233 SBSQ HOSP IP/OBS HIGH 50: CPT | Performed by: INTERNAL MEDICINE

## 2017-08-30 PROCEDURE — 82533 TOTAL CORTISOL: CPT | Performed by: INTERNAL MEDICINE

## 2017-08-30 RX ORDER — COSYNTROPIN 0.25 MG/ML
0.25 INJECTION, POWDER, FOR SOLUTION INTRAMUSCULAR; INTRAVENOUS ONCE
Status: COMPLETED | OUTPATIENT
Start: 2017-08-30 | End: 2017-08-30

## 2017-08-30 RX ADMIN — FAMOTIDINE 20 MG: 10 INJECTION, SOLUTION INTRAVENOUS at 08:38

## 2017-08-30 RX ADMIN — HEPARIN SODIUM 5000 UNITS: 5000 INJECTION, SOLUTION INTRAVENOUS; SUBCUTANEOUS at 08:39

## 2017-08-30 RX ADMIN — HYDROMORPHONE HYDROCHLORIDE 0.5 MG: 1 INJECTION, SOLUTION INTRAMUSCULAR; INTRAVENOUS; SUBCUTANEOUS at 20:29

## 2017-08-30 RX ADMIN — ONDANSETRON 4 MG: 2 INJECTION INTRAMUSCULAR; INTRAVENOUS at 17:39

## 2017-08-30 RX ADMIN — DIATRIZOATE MEGLUMINE AND DIATRIZOATE SODIUM 240 ML: 660; 100 LIQUID ORAL; RECTAL at 09:22

## 2017-08-30 RX ADMIN — COSYNTROPIN 0.25 MG: 0.25 INJECTION, POWDER, LYOPHILIZED, FOR SOLUTION INTRAVENOUS at 15:31

## 2017-08-30 RX ADMIN — ONDANSETRON 4 MG: 2 INJECTION INTRAMUSCULAR; INTRAVENOUS at 08:38

## 2017-08-30 RX ADMIN — Medication: at 08:39

## 2017-08-30 RX ADMIN — PROMETHAZINE HYDROCHLORIDE 12.5 MG: 25 INJECTION INTRAMUSCULAR; INTRAVENOUS at 12:41

## 2017-08-30 RX ADMIN — PROMETHAZINE HYDROCHLORIDE 12.5 MG: 25 INJECTION INTRAMUSCULAR; INTRAVENOUS at 19:38

## 2017-08-30 RX ADMIN — POTASSIUM CHLORIDE, DEXTROSE MONOHYDRATE AND SODIUM CHLORIDE 125 ML/HR: 150; 5; 450 INJECTION, SOLUTION INTRAVENOUS at 08:39

## 2017-08-30 RX ADMIN — HEPARIN SODIUM 5000 UNITS: 5000 INJECTION, SOLUTION INTRAVENOUS; SUBCUTANEOUS at 20:26

## 2017-08-30 RX ADMIN — HYDROMORPHONE HYDROCHLORIDE 0.5 MG: 1 INJECTION, SOLUTION INTRAMUSCULAR; INTRAVENOUS; SUBCUTANEOUS at 14:32

## 2017-08-30 RX ADMIN — HYDROMORPHONE HYDROCHLORIDE 0.5 MG: 1 INJECTION, SOLUTION INTRAMUSCULAR; INTRAVENOUS; SUBCUTANEOUS at 08:38

## 2017-08-31 LAB — ACTH PLAS-MCNC: 9.1 PG/ML (ref 7.2–63.3)

## 2017-09-11 ENCOUNTER — HOSPITAL ENCOUNTER (EMERGENCY)
Facility: HOSPITAL | Age: 52
Discharge: HOME | End: 2017-09-11
Attending: EMERGENCY MEDICINE | Admitting: EMERGENCY MEDICINE

## 2017-09-11 ENCOUNTER — APPOINTMENT (OUTPATIENT)
Dept: CT IMAGING | Facility: HOSPITAL | Age: 52
End: 2017-09-11

## 2017-09-11 VITALS
WEIGHT: 160 LBS | TEMPERATURE: 97.7 F | SYSTOLIC BLOOD PRESSURE: 101 MMHG | HEART RATE: 46 BPM | RESPIRATION RATE: 18 BRPM | HEIGHT: 69 IN | OXYGEN SATURATION: 98 % | BODY MASS INDEX: 23.7 KG/M2 | DIASTOLIC BLOOD PRESSURE: 69 MMHG

## 2017-09-11 DIAGNOSIS — R10.9 ABDOMINAL PAIN, UNSPECIFIED LOCATION: Primary | ICD-10-CM

## 2017-09-11 LAB
ALBUMIN SERPL-MCNC: 4.6 G/DL (ref 3.2–4.8)
ALBUMIN/GLOB SERPL: 1.7 G/DL (ref 1.5–2.5)
ALP SERPL-CCNC: 90 U/L (ref 25–100)
ALT SERPL W P-5'-P-CCNC: 10 U/L (ref 7–40)
AMYLASE SERPL-CCNC: 66 U/L (ref 30–118)
ANION GAP SERPL CALCULATED.3IONS-SCNC: 5 MMOL/L (ref 3–11)
AST SERPL-CCNC: 13 U/L (ref 0–33)
BACTERIA UR QL AUTO: ABNORMAL /HPF
BASOPHILS # BLD AUTO: 0.03 10*3/MM3 (ref 0–0.2)
BASOPHILS NFR BLD AUTO: 0.6 % (ref 0–1)
BILIRUB SERPL-MCNC: 0.3 MG/DL (ref 0.3–1.2)
BILIRUB UR QL STRIP: NEGATIVE
BUN BLD-MCNC: 10 MG/DL (ref 9–23)
BUN/CREAT SERPL: 10 (ref 7–25)
CALCIUM SPEC-SCNC: 9.7 MG/DL (ref 8.7–10.4)
CHLORIDE SERPL-SCNC: 109 MMOL/L (ref 99–109)
CLARITY UR: CLEAR
CO2 SERPL-SCNC: 30 MMOL/L (ref 20–31)
COLOR UR: YELLOW
CREAT BLD-MCNC: 1 MG/DL (ref 0.6–1.3)
D-LACTATE SERPL-SCNC: 0.6 MMOL/L (ref 0.5–2)
DEPRECATED RDW RBC AUTO: 43 FL (ref 37–54)
DEVELOPER EXPIRATION DATE: NORMAL
DEVELOPER LOT NUMBER: NORMAL
EOSINOPHIL # BLD AUTO: 0.07 10*3/MM3 (ref 0–0.3)
EOSINOPHIL NFR BLD AUTO: 1.4 % (ref 0–3)
ERYTHROCYTE [DISTWIDTH] IN BLOOD BY AUTOMATED COUNT: 13.2 % (ref 11.3–14.5)
EXPIRATION DATE: NORMAL
FECAL OCCULT BLOOD SCREEN, POC: NEGATIVE
GFR SERPL CREATININE-BSD FRML MDRD: 78 ML/MIN/1.73
GLOBULIN UR ELPH-MCNC: 2.7 GM/DL
GLUCOSE BLD-MCNC: 80 MG/DL (ref 70–100)
GLUCOSE UR STRIP-MCNC: NEGATIVE MG/DL
HCT VFR BLD AUTO: 38.9 % (ref 38.9–50.9)
HGB BLD-MCNC: 13.6 G/DL (ref 13.1–17.5)
HGB UR QL STRIP.AUTO: ABNORMAL
HYALINE CASTS UR QL AUTO: ABNORMAL /LPF
IMM GRANULOCYTES # BLD: 0.01 10*3/MM3 (ref 0–0.03)
IMM GRANULOCYTES NFR BLD: 0.2 % (ref 0–0.6)
KETONES UR QL STRIP: NEGATIVE
LEUKOCYTE ESTERASE UR QL STRIP.AUTO: NEGATIVE
LIPASE SERPL-CCNC: 118 U/L (ref 6–51)
LYMPHOCYTES # BLD AUTO: 1.26 10*3/MM3 (ref 0.6–4.8)
LYMPHOCYTES NFR BLD AUTO: 25.7 % (ref 24–44)
Lab: NORMAL
MCH RBC QN AUTO: 31.1 PG (ref 27–31)
MCHC RBC AUTO-ENTMCNC: 35 G/DL (ref 32–36)
MCV RBC AUTO: 89 FL (ref 80–99)
MONOCYTES # BLD AUTO: 0.45 10*3/MM3 (ref 0–1)
MONOCYTES NFR BLD AUTO: 9.2 % (ref 0–12)
NEGATIVE CONTROL: NEGATIVE
NEUTROPHILS # BLD AUTO: 3.08 10*3/MM3 (ref 1.5–8.3)
NEUTROPHILS NFR BLD AUTO: 62.9 % (ref 41–71)
NITRITE UR QL STRIP: NEGATIVE
PH UR STRIP.AUTO: 6 [PH] (ref 5–8)
PLATELET # BLD AUTO: 161 10*3/MM3 (ref 150–450)
PMV BLD AUTO: 10.3 FL (ref 6–12)
POSITIVE CONTROL: POSITIVE
POTASSIUM BLD-SCNC: 4.2 MMOL/L (ref 3.5–5.5)
PROT SERPL-MCNC: 7.3 G/DL (ref 5.7–8.2)
PROT UR QL STRIP: NEGATIVE
RBC # BLD AUTO: 4.37 10*6/MM3 (ref 4.2–5.76)
RBC # UR: ABNORMAL /HPF
REF LAB TEST METHOD: ABNORMAL
SODIUM BLD-SCNC: 144 MMOL/L (ref 132–146)
SP GR UR STRIP: 1.01 (ref 1–1.03)
SQUAMOUS #/AREA URNS HPF: ABNORMAL /HPF
UROBILINOGEN UR QL STRIP: ABNORMAL
WBC NRBC COR # BLD: 4.9 10*3/MM3 (ref 3.5–10.8)
WBC UR QL AUTO: ABNORMAL /HPF

## 2017-09-11 PROCEDURE — 80053 COMPREHEN METABOLIC PANEL: CPT | Performed by: EMERGENCY MEDICINE

## 2017-09-11 PROCEDURE — 99284 EMERGENCY DEPT VISIT MOD MDM: CPT

## 2017-09-11 PROCEDURE — 96374 THER/PROPH/DIAG INJ IV PUSH: CPT

## 2017-09-11 PROCEDURE — 83605 ASSAY OF LACTIC ACID: CPT | Performed by: EMERGENCY MEDICINE

## 2017-09-11 PROCEDURE — 0 IOPAMIDOL 61 % SOLUTION: Performed by: EMERGENCY MEDICINE

## 2017-09-11 PROCEDURE — 96375 TX/PRO/DX INJ NEW DRUG ADDON: CPT

## 2017-09-11 PROCEDURE — 74177 CT ABD & PELVIS W/CONTRAST: CPT

## 2017-09-11 PROCEDURE — 25010000002 HYDROMORPHONE PER 4 MG: Performed by: EMERGENCY MEDICINE

## 2017-09-11 PROCEDURE — 25010000002 ONDANSETRON PER 1 MG: Performed by: EMERGENCY MEDICINE

## 2017-09-11 PROCEDURE — 85025 COMPLETE CBC W/AUTO DIFF WBC: CPT | Performed by: EMERGENCY MEDICINE

## 2017-09-11 PROCEDURE — 96361 HYDRATE IV INFUSION ADD-ON: CPT

## 2017-09-11 PROCEDURE — 93005 ELECTROCARDIOGRAM TRACING: CPT | Performed by: EMERGENCY MEDICINE

## 2017-09-11 PROCEDURE — 36415 COLL VENOUS BLD VENIPUNCTURE: CPT

## 2017-09-11 PROCEDURE — 81001 URINALYSIS AUTO W/SCOPE: CPT | Performed by: EMERGENCY MEDICINE

## 2017-09-11 PROCEDURE — 83690 ASSAY OF LIPASE: CPT | Performed by: EMERGENCY MEDICINE

## 2017-09-11 PROCEDURE — 82150 ASSAY OF AMYLASE: CPT | Performed by: EMERGENCY MEDICINE

## 2017-09-11 RX ORDER — HYDROCODONE BITARTRATE AND ACETAMINOPHEN 7.5; 325 MG/1; MG/1
1 TABLET ORAL EVERY 6 HOURS PRN
Qty: 12 TABLET | Refills: 0 | Status: ON HOLD | OUTPATIENT
Start: 2017-09-11 | End: 2017-10-09

## 2017-09-11 RX ORDER — ONDANSETRON 4 MG/1
4 TABLET, FILM COATED ORAL EVERY 6 HOURS PRN
COMMUNITY
End: 2018-01-19

## 2017-09-11 RX ORDER — ONDANSETRON 2 MG/ML
4 INJECTION INTRAMUSCULAR; INTRAVENOUS ONCE
Status: COMPLETED | OUTPATIENT
Start: 2017-09-11 | End: 2017-09-11

## 2017-09-11 RX ADMIN — HYDROMORPHONE HYDROCHLORIDE 1 MG: 1 INJECTION, SOLUTION INTRAMUSCULAR; INTRAVENOUS; SUBCUTANEOUS at 11:35

## 2017-09-11 RX ADMIN — SODIUM CHLORIDE 1000 ML: 9 INJECTION, SOLUTION INTRAVENOUS at 11:32

## 2017-09-11 RX ADMIN — ONDANSETRON 4 MG: 2 INJECTION INTRAMUSCULAR; INTRAVENOUS at 11:35

## 2017-09-11 RX ADMIN — IOPAMIDOL 98 ML: 612 INJECTION, SOLUTION INTRAVENOUS at 13:08

## 2017-09-29 ENCOUNTER — APPOINTMENT (OUTPATIENT)
Dept: CT IMAGING | Facility: HOSPITAL | Age: 52
End: 2017-09-29
Attending: EMERGENCY MEDICINE

## 2017-09-29 ENCOUNTER — ANESTHESIA EVENT (OUTPATIENT)
Dept: PERIOP | Facility: HOSPITAL | Age: 52
End: 2017-09-29

## 2017-09-29 ENCOUNTER — ANESTHESIA (OUTPATIENT)
Dept: PERIOP | Facility: HOSPITAL | Age: 52
End: 2017-09-29

## 2017-09-29 ENCOUNTER — HOSPITAL ENCOUNTER (INPATIENT)
Facility: HOSPITAL | Age: 52
LOS: 11 days | Discharge: HOME | End: 2017-10-10
Attending: EMERGENCY MEDICINE | Admitting: INTERNAL MEDICINE

## 2017-09-29 ENCOUNTER — APPOINTMENT (OUTPATIENT)
Dept: GENERAL RADIOLOGY | Facility: HOSPITAL | Age: 52
End: 2017-09-29

## 2017-09-29 DIAGNOSIS — R31.9 HEMATURIA: ICD-10-CM

## 2017-09-29 DIAGNOSIS — R10.84 GENERALIZED ABDOMINAL PAIN: ICD-10-CM

## 2017-09-29 DIAGNOSIS — K56.609 SMALL BOWEL OBSTRUCTION (HCC): Primary | ICD-10-CM

## 2017-09-29 LAB
ABO GROUP BLD: NORMAL
ABO GROUP BLD: NORMAL
ALBUMIN SERPL-MCNC: 4.8 G/DL (ref 3.2–4.8)
ALBUMIN/GLOB SERPL: 1.8 G/DL (ref 1.5–2.5)
ALP SERPL-CCNC: 87 U/L (ref 25–100)
ALT SERPL W P-5'-P-CCNC: 10 U/L (ref 7–40)
ANION GAP SERPL CALCULATED.3IONS-SCNC: 7 MMOL/L (ref 3–11)
AST SERPL-CCNC: 15 U/L (ref 0–33)
BACTERIA UR QL AUTO: ABNORMAL /HPF
BASOPHILS # BLD AUTO: 0.02 10*3/MM3 (ref 0–0.2)
BASOPHILS NFR BLD AUTO: 0.2 % (ref 0–1)
BILIRUB SERPL-MCNC: 0.4 MG/DL (ref 0.3–1.2)
BILIRUB UR QL STRIP: NEGATIVE
BLD GP AB SCN SERPL QL: POSITIVE
BUN BLD-MCNC: 13 MG/DL (ref 9–23)
BUN/CREAT SERPL: 13 (ref 7–25)
CALCIUM SPEC-SCNC: 9.7 MG/DL (ref 8.7–10.4)
CHLORIDE SERPL-SCNC: 107 MMOL/L (ref 99–109)
CLARITY UR: CLEAR
CO2 SERPL-SCNC: 26 MMOL/L (ref 20–31)
COLOR UR: YELLOW
CREAT BLD-MCNC: 1 MG/DL (ref 0.6–1.3)
D-LACTATE SERPL-SCNC: 0.9 MMOL/L (ref 0.5–2)
DEPRECATED RDW RBC AUTO: 43.3 FL (ref 37–54)
EOSINOPHIL # BLD AUTO: 0.12 10*3/MM3 (ref 0–0.3)
EOSINOPHIL NFR BLD AUTO: 1.4 % (ref 0–3)
ERYTHROCYTE [DISTWIDTH] IN BLOOD BY AUTOMATED COUNT: 13.4 % (ref 11.3–14.5)
GFR SERPL CREATININE-BSD FRML MDRD: 78 ML/MIN/1.73
GLOBULIN UR ELPH-MCNC: 2.6 GM/DL
GLUCOSE BLD-MCNC: 116 MG/DL (ref 70–100)
GLUCOSE UR STRIP-MCNC: NEGATIVE MG/DL
HCT VFR BLD AUTO: 42.5 % (ref 38.9–50.9)
HGB BLD-MCNC: 15 G/DL (ref 13.1–17.5)
HGB UR QL STRIP.AUTO: ABNORMAL
HOLD SPECIMEN: NORMAL
HYALINE CASTS UR QL AUTO: ABNORMAL /LPF
IMM GRANULOCYTES # BLD: 0.02 10*3/MM3 (ref 0–0.03)
IMM GRANULOCYTES NFR BLD: 0.2 % (ref 0–0.6)
KETONES UR QL STRIP: NEGATIVE
LEUKOCYTE ESTERASE UR QL STRIP.AUTO: NEGATIVE
LIPASE SERPL-CCNC: 39 U/L (ref 6–51)
LYMPHOCYTES # BLD AUTO: 1.21 10*3/MM3 (ref 0.6–4.8)
LYMPHOCYTES NFR BLD AUTO: 13.8 % (ref 24–44)
MCH RBC QN AUTO: 31.1 PG (ref 27–31)
MCHC RBC AUTO-ENTMCNC: 35.3 G/DL (ref 32–36)
MCV RBC AUTO: 88 FL (ref 80–99)
MONOCYTES # BLD AUTO: 0.65 10*3/MM3 (ref 0–1)
MONOCYTES NFR BLD AUTO: 7.4 % (ref 0–12)
NEUTROPHILS # BLD AUTO: 6.78 10*3/MM3 (ref 1.5–8.3)
NEUTROPHILS NFR BLD AUTO: 77 % (ref 41–71)
NITRITE UR QL STRIP: NEGATIVE
PH UR STRIP.AUTO: 6 [PH] (ref 5–8)
PLATELET # BLD AUTO: 175 10*3/MM3 (ref 150–450)
PMV BLD AUTO: 10.8 FL (ref 6–12)
POTASSIUM BLD-SCNC: 4 MMOL/L (ref 3.5–5.5)
PROT SERPL-MCNC: 7.4 G/DL (ref 5.7–8.2)
PROT UR QL STRIP: NEGATIVE
RBC # BLD AUTO: 4.83 10*6/MM3 (ref 4.2–5.76)
RBC # UR: ABNORMAL /HPF
REF LAB TEST METHOD: ABNORMAL
RH BLD: POSITIVE
RH BLD: POSITIVE
SALINE SCREEN: NEGATIVE
SODIUM BLD-SCNC: 140 MMOL/L (ref 132–146)
SP GR UR STRIP: 1.02 (ref 1–1.03)
SQUAMOUS #/AREA URNS HPF: ABNORMAL /HPF
TROPONIN I SERPL-MCNC: 0 NG/ML (ref 0–0.07)
UROBILINOGEN UR QL STRIP: ABNORMAL
WARM AUTOANTIBODY: NORMAL
WBC NRBC COR # BLD: 8.8 10*3/MM3 (ref 3.5–10.8)
WBC UR QL AUTO: ABNORMAL /HPF
WHOLE BLOOD HOLD SPECIMEN: NORMAL
WHOLE BLOOD HOLD SPECIMEN: NORMAL

## 2017-09-29 PROCEDURE — 84484 ASSAY OF TROPONIN QUANT: CPT

## 2017-09-29 PROCEDURE — 0DN80ZZ RELEASE SMALL INTESTINE, OPEN APPROACH: ICD-10-PCS | Performed by: COLON & RECTAL SURGERY

## 2017-09-29 PROCEDURE — 25010000002 BUPRENORPHINE PER 0.1 MG: Performed by: NURSE ANESTHETIST, CERTIFIED REGISTERED

## 2017-09-29 PROCEDURE — 86900 BLOOD TYPING SEROLOGIC ABO: CPT

## 2017-09-29 PROCEDURE — 93005 ELECTROCARDIOGRAM TRACING: CPT | Performed by: EMERGENCY MEDICINE

## 2017-09-29 PROCEDURE — 25010000002 HYDROMORPHONE PER 4 MG: Performed by: EMERGENCY MEDICINE

## 2017-09-29 PROCEDURE — 86880 COOMBS TEST DIRECT: CPT | Performed by: EMERGENCY MEDICINE

## 2017-09-29 PROCEDURE — 25010000002 ONDANSETRON PER 1 MG: Performed by: EMERGENCY MEDICINE

## 2017-09-29 PROCEDURE — 83605 ASSAY OF LACTIC ACID: CPT | Performed by: EMERGENCY MEDICINE

## 2017-09-29 PROCEDURE — 25010000002 PROPOFOL 10 MG/ML EMULSION: Performed by: NURSE ANESTHETIST, CERTIFIED REGISTERED

## 2017-09-29 PROCEDURE — 86900 BLOOD TYPING SEROLOGIC ABO: CPT | Performed by: EMERGENCY MEDICINE

## 2017-09-29 PROCEDURE — 86901 BLOOD TYPING SEROLOGIC RH(D): CPT | Performed by: EMERGENCY MEDICINE

## 2017-09-29 PROCEDURE — 25010000002 MORPHINE SULFATE (PF) 2 MG/ML SOLUTION: Performed by: HOSPITALIST

## 2017-09-29 PROCEDURE — 25010000002 ONDANSETRON PER 1 MG: Performed by: NURSE ANESTHETIST, CERTIFIED REGISTERED

## 2017-09-29 PROCEDURE — 25010000002 SUCCINYLCHOLINE PER 20 MG: Performed by: NURSE ANESTHETIST, CERTIFIED REGISTERED

## 2017-09-29 PROCEDURE — 0WPF0YZ REMOVAL OF OTHER DEVICE FROM ABDOMINAL WALL, OPEN APPROACH: ICD-10-PCS | Performed by: COLON & RECTAL SURGERY

## 2017-09-29 PROCEDURE — 0 DIATRIZOATE MEGLUMINE & SODIUM PER 1 ML: Performed by: EMERGENCY MEDICINE

## 2017-09-29 PROCEDURE — 25010000002 PROPOFOL 1000 MG/ML EMULSION: Performed by: NURSE ANESTHETIST, CERTIFIED REGISTERED

## 2017-09-29 PROCEDURE — 83690 ASSAY OF LIPASE: CPT | Performed by: EMERGENCY MEDICINE

## 2017-09-29 PROCEDURE — 25010000002 MIDAZOLAM PER 1 MG: Performed by: NURSE ANESTHETIST, CERTIFIED REGISTERED

## 2017-09-29 PROCEDURE — 25010000002 ONDANSETRON PER 1 MG: Performed by: HOSPITALIST

## 2017-09-29 PROCEDURE — 85025 COMPLETE CBC W/AUTO DIFF WBC: CPT | Performed by: EMERGENCY MEDICINE

## 2017-09-29 PROCEDURE — 86870 RBC ANTIBODY IDENTIFICATION: CPT | Performed by: EMERGENCY MEDICINE

## 2017-09-29 PROCEDURE — 25010000002 FENTANYL CITRATE (PF) 100 MCG/2ML SOLUTION: Performed by: NURSE ANESTHETIST, CERTIFIED REGISTERED

## 2017-09-29 PROCEDURE — 25010000002 ONDANSETRON PER 1 MG: Performed by: ANESTHESIOLOGY

## 2017-09-29 PROCEDURE — 99223 1ST HOSP IP/OBS HIGH 75: CPT | Performed by: HOSPITALIST

## 2017-09-29 PROCEDURE — 99285 EMERGENCY DEPT VISIT HI MDM: CPT

## 2017-09-29 PROCEDURE — 93005 ELECTROCARDIOGRAM TRACING: CPT | Performed by: HOSPITALIST

## 2017-09-29 PROCEDURE — 25010000002 PROMETHAZINE PER 50 MG: Performed by: EMERGENCY MEDICINE

## 2017-09-29 PROCEDURE — 25010000002 DEXAMETHASONE SODIUM PHOSPHATE 10 MG/ML SOLUTION 1 ML VIAL: Performed by: NURSE ANESTHETIST, CERTIFIED REGISTERED

## 2017-09-29 PROCEDURE — 25010000002 HYDROMORPHONE PER 4 MG: Performed by: NURSE ANESTHETIST, CERTIFIED REGISTERED

## 2017-09-29 PROCEDURE — 81001 URINALYSIS AUTO W/SCOPE: CPT | Performed by: EMERGENCY MEDICINE

## 2017-09-29 PROCEDURE — 86850 RBC ANTIBODY SCREEN: CPT | Performed by: EMERGENCY MEDICINE

## 2017-09-29 PROCEDURE — 25010000002 HEPARIN (PORCINE) PER 1000 UNITS: Performed by: COLON & RECTAL SURGERY

## 2017-09-29 PROCEDURE — 0 IOPAMIDOL 61 % SOLUTION: Performed by: EMERGENCY MEDICINE

## 2017-09-29 PROCEDURE — 25010000002 NEOSTIGMINE PER 0.5 MG: Performed by: NURSE ANESTHETIST, CERTIFIED REGISTERED

## 2017-09-29 PROCEDURE — 74177 CT ABD & PELVIS W/CONTRAST: CPT

## 2017-09-29 PROCEDURE — 80053 COMPREHEN METABOLIC PANEL: CPT | Performed by: EMERGENCY MEDICINE

## 2017-09-29 PROCEDURE — 25010000002 DEXAMETHASONE PER 1 MG: Performed by: NURSE ANESTHETIST, CERTIFIED REGISTERED

## 2017-09-29 PROCEDURE — 86901 BLOOD TYPING SEROLOGIC RH(D): CPT

## 2017-09-29 RX ORDER — MORPHINE SULFATE 2 MG/ML
2 INJECTION, SOLUTION INTRAMUSCULAR; INTRAVENOUS
Status: DISCONTINUED | OUTPATIENT
Start: 2017-09-29 | End: 2017-09-29

## 2017-09-29 RX ORDER — SODIUM CHLORIDE, SODIUM LACTATE, POTASSIUM CHLORIDE, CALCIUM CHLORIDE 600; 310; 30; 20 MG/100ML; MG/100ML; MG/100ML; MG/100ML
9 INJECTION, SOLUTION INTRAVENOUS CONTINUOUS
Status: DISCONTINUED | OUTPATIENT
Start: 2017-09-29 | End: 2017-09-30

## 2017-09-29 RX ORDER — ONDANSETRON 2 MG/ML
INJECTION INTRAMUSCULAR; INTRAVENOUS AS NEEDED
Status: DISCONTINUED | OUTPATIENT
Start: 2017-09-29 | End: 2017-09-29 | Stop reason: SURG

## 2017-09-29 RX ORDER — MAGNESIUM HYDROXIDE 1200 MG/15ML
LIQUID ORAL AS NEEDED
Status: DISCONTINUED | OUTPATIENT
Start: 2017-09-29 | End: 2017-09-29 | Stop reason: HOSPADM

## 2017-09-29 RX ORDER — LABETALOL HYDROCHLORIDE 5 MG/ML
5 INJECTION, SOLUTION INTRAVENOUS
Status: DISCONTINUED | OUTPATIENT
Start: 2017-09-29 | End: 2017-09-29 | Stop reason: HOSPADM

## 2017-09-29 RX ORDER — LIDOCAINE HYDROCHLORIDE 10 MG/ML
INJECTION, SOLUTION INFILTRATION; PERINEURAL AS NEEDED
Status: DISCONTINUED | OUTPATIENT
Start: 2017-09-29 | End: 2017-09-29 | Stop reason: SURG

## 2017-09-29 RX ORDER — HYDROMORPHONE HYDROCHLORIDE 1 MG/ML
0.5 INJECTION, SOLUTION INTRAMUSCULAR; INTRAVENOUS; SUBCUTANEOUS
Status: DISCONTINUED | OUTPATIENT
Start: 2017-09-29 | End: 2017-09-29 | Stop reason: HOSPADM

## 2017-09-29 RX ORDER — ACETAMINOPHEN 500 MG
1000 TABLET ORAL 3 TIMES DAILY
Status: DISCONTINUED | OUTPATIENT
Start: 2017-09-29 | End: 2017-10-10 | Stop reason: HOSPADM

## 2017-09-29 RX ORDER — SODIUM CHLORIDE 0.9 % (FLUSH) 0.9 %
1-10 SYRINGE (ML) INJECTION AS NEEDED
Status: DISCONTINUED | OUTPATIENT
Start: 2017-09-29 | End: 2017-09-29 | Stop reason: HOSPADM

## 2017-09-29 RX ORDER — IPRATROPIUM BROMIDE AND ALBUTEROL SULFATE 2.5; .5 MG/3ML; MG/3ML
3 SOLUTION RESPIRATORY (INHALATION) ONCE AS NEEDED
Status: DISCONTINUED | OUTPATIENT
Start: 2017-09-29 | End: 2017-09-29 | Stop reason: HOSPADM

## 2017-09-29 RX ORDER — HEPARIN SODIUM 5000 [USP'U]/ML
5000 INJECTION, SOLUTION INTRAVENOUS; SUBCUTANEOUS EVERY 8 HOURS SCHEDULED
Status: DISCONTINUED | OUTPATIENT
Start: 2017-09-30 | End: 2017-10-10 | Stop reason: HOSPADM

## 2017-09-29 RX ORDER — DIAZEPAM 5 MG/1
5 TABLET ORAL EVERY 6 HOURS PRN
Status: DISCONTINUED | OUTPATIENT
Start: 2017-09-29 | End: 2017-09-30

## 2017-09-29 RX ORDER — FAMOTIDINE 20 MG/1
20 TABLET, FILM COATED ORAL ONCE
Status: DISCONTINUED | OUTPATIENT
Start: 2017-09-29 | End: 2017-09-29 | Stop reason: HOSPADM

## 2017-09-29 RX ORDER — MORPHINE SULFATE 10 MG/ML
2 INJECTION INTRAMUSCULAR; INTRAVENOUS; SUBCUTANEOUS
Status: DISCONTINUED | OUTPATIENT
Start: 2017-09-29 | End: 2017-09-30 | Stop reason: SDUPTHER

## 2017-09-29 RX ORDER — PROMETHAZINE HYDROCHLORIDE 25 MG/1
25 SUPPOSITORY RECTAL ONCE AS NEEDED
Status: DISCONTINUED | OUTPATIENT
Start: 2017-09-29 | End: 2017-09-29 | Stop reason: HOSPADM

## 2017-09-29 RX ORDER — ONDANSETRON 2 MG/ML
4 INJECTION INTRAMUSCULAR; INTRAVENOUS EVERY 6 HOURS PRN
Status: DISCONTINUED | OUTPATIENT
Start: 2017-09-29 | End: 2017-09-30

## 2017-09-29 RX ORDER — MIDAZOLAM HYDROCHLORIDE 1 MG/ML
INJECTION INTRAMUSCULAR; INTRAVENOUS AS NEEDED
Status: DISCONTINUED | OUTPATIENT
Start: 2017-09-29 | End: 2017-09-29 | Stop reason: SURG

## 2017-09-29 RX ORDER — SCOLOPAMINE TRANSDERMAL SYSTEM 1 MG/1
1 PATCH, EXTENDED RELEASE TRANSDERMAL ONCE
Status: COMPLETED | OUTPATIENT
Start: 2017-09-29 | End: 2017-10-02

## 2017-09-29 RX ORDER — ONDANSETRON 2 MG/ML
4 INJECTION INTRAMUSCULAR; INTRAVENOUS ONCE
Status: COMPLETED | OUTPATIENT
Start: 2017-09-29 | End: 2017-09-29

## 2017-09-29 RX ORDER — SODIUM CHLORIDE 0.9 % (FLUSH) 0.9 %
1-10 SYRINGE (ML) INJECTION AS NEEDED
Status: DISCONTINUED | OUTPATIENT
Start: 2017-09-29 | End: 2017-09-30

## 2017-09-29 RX ORDER — KETOROLAC TROMETHAMINE 30 MG/ML
15 INJECTION, SOLUTION INTRAMUSCULAR; INTRAVENOUS EVERY 6 HOURS PRN
Status: COMPLETED | OUTPATIENT
Start: 2017-09-29 | End: 2017-09-30

## 2017-09-29 RX ORDER — HEPARIN SODIUM 5000 [USP'U]/ML
5000 INJECTION, SOLUTION INTRAVENOUS; SUBCUTANEOUS ONCE
Status: COMPLETED | OUTPATIENT
Start: 2017-09-29 | End: 2017-09-29

## 2017-09-29 RX ORDER — SODIUM CHLORIDE 9 MG/ML
100 INJECTION, SOLUTION INTRAVENOUS CONTINUOUS
Status: DISCONTINUED | OUTPATIENT
Start: 2017-09-29 | End: 2017-09-30

## 2017-09-29 RX ORDER — SUCCINYLCHOLINE CHLORIDE 20 MG/ML
INJECTION INTRAMUSCULAR; INTRAVENOUS AS NEEDED
Status: DISCONTINUED | OUTPATIENT
Start: 2017-09-29 | End: 2017-09-29 | Stop reason: SURG

## 2017-09-29 RX ORDER — LIDOCAINE HYDROCHLORIDE 10 MG/ML
0.5 INJECTION, SOLUTION EPIDURAL; INFILTRATION; INTRACAUDAL; PERINEURAL ONCE AS NEEDED
Status: DISCONTINUED | OUTPATIENT
Start: 2017-09-29 | End: 2017-09-29 | Stop reason: HOSPADM

## 2017-09-29 RX ORDER — PROMETHAZINE HYDROCHLORIDE 25 MG/1
25 TABLET ORAL ONCE AS NEEDED
Status: DISCONTINUED | OUTPATIENT
Start: 2017-09-29 | End: 2017-09-29 | Stop reason: HOSPADM

## 2017-09-29 RX ORDER — FENTANYL CITRATE 50 UG/ML
INJECTION, SOLUTION INTRAMUSCULAR; INTRAVENOUS AS NEEDED
Status: DISCONTINUED | OUTPATIENT
Start: 2017-09-29 | End: 2017-09-29 | Stop reason: SURG

## 2017-09-29 RX ORDER — GLYCOPYRROLATE 0.2 MG/ML
INJECTION INTRAMUSCULAR; INTRAVENOUS AS NEEDED
Status: DISCONTINUED | OUTPATIENT
Start: 2017-09-29 | End: 2017-09-29 | Stop reason: SURG

## 2017-09-29 RX ORDER — PROMETHAZINE HYDROCHLORIDE 25 MG/ML
6.25 INJECTION, SOLUTION INTRAMUSCULAR; INTRAVENOUS ONCE AS NEEDED
Status: DISCONTINUED | OUTPATIENT
Start: 2017-09-29 | End: 2017-09-29 | Stop reason: HOSPADM

## 2017-09-29 RX ORDER — DEXAMETHASONE SODIUM PHOSPHATE 4 MG/ML
INJECTION, SOLUTION INTRA-ARTICULAR; INTRALESIONAL; INTRAMUSCULAR; INTRAVENOUS; SOFT TISSUE AS NEEDED
Status: DISCONTINUED | OUTPATIENT
Start: 2017-09-29 | End: 2017-09-29 | Stop reason: SURG

## 2017-09-29 RX ORDER — FENTANYL CITRATE 50 UG/ML
50 INJECTION, SOLUTION INTRAMUSCULAR; INTRAVENOUS
Status: DISCONTINUED | OUTPATIENT
Start: 2017-09-29 | End: 2017-09-29 | Stop reason: HOSPADM

## 2017-09-29 RX ORDER — PROPOFOL 10 MG/ML
VIAL (ML) INTRAVENOUS AS NEEDED
Status: DISCONTINUED | OUTPATIENT
Start: 2017-09-29 | End: 2017-09-29 | Stop reason: SURG

## 2017-09-29 RX ORDER — PROMETHAZINE HYDROCHLORIDE 25 MG/ML
12.5 INJECTION, SOLUTION INTRAMUSCULAR; INTRAVENOUS ONCE
Status: COMPLETED | OUTPATIENT
Start: 2017-09-29 | End: 2017-09-29

## 2017-09-29 RX ORDER — ONDANSETRON 2 MG/ML
8 INJECTION INTRAMUSCULAR; INTRAVENOUS ONCE
Status: COMPLETED | OUTPATIENT
Start: 2017-09-29 | End: 2017-09-29

## 2017-09-29 RX ORDER — HYDROMORPHONE HYDROCHLORIDE 1 MG/ML
0.5 INJECTION, SOLUTION INTRAMUSCULAR; INTRAVENOUS; SUBCUTANEOUS ONCE
Status: COMPLETED | OUTPATIENT
Start: 2017-09-29 | End: 2017-09-29

## 2017-09-29 RX ORDER — FAMOTIDINE 10 MG/ML
20 INJECTION, SOLUTION INTRAVENOUS ONCE
Status: COMPLETED | OUTPATIENT
Start: 2017-09-29 | End: 2017-09-29

## 2017-09-29 RX ORDER — SODIUM CHLORIDE 9 MG/ML
125 INJECTION, SOLUTION INTRAVENOUS CONTINUOUS
Status: DISCONTINUED | OUTPATIENT
Start: 2017-09-29 | End: 2017-09-30

## 2017-09-29 RX ORDER — SODIUM CHLORIDE 0.9 % (FLUSH) 0.9 %
10 SYRINGE (ML) INJECTION AS NEEDED
Status: DISCONTINUED | OUTPATIENT
Start: 2017-09-29 | End: 2017-09-30

## 2017-09-29 RX ORDER — ATRACURIUM BESYLATE 10 MG/ML
INJECTION, SOLUTION INTRAVENOUS AS NEEDED
Status: DISCONTINUED | OUTPATIENT
Start: 2017-09-29 | End: 2017-09-29 | Stop reason: SURG

## 2017-09-29 RX ADMIN — HYDROMORPHONE HYDROCHLORIDE 1 MG: 1 INJECTION, SOLUTION INTRAMUSCULAR; INTRAVENOUS; SUBCUTANEOUS at 06:23

## 2017-09-29 RX ADMIN — HYDROMORPHONE HYDROCHLORIDE 0.5 MG: 1 INJECTION, SOLUTION INTRAMUSCULAR; INTRAVENOUS; SUBCUTANEOUS at 08:48

## 2017-09-29 RX ADMIN — MORPHINE SULFATE 2 MG: 2 INJECTION, SOLUTION INTRAMUSCULAR; INTRAVENOUS at 10:33

## 2017-09-29 RX ADMIN — SUCCINYLCHOLINE CHLORIDE 160 MG: 20 INJECTION, SOLUTION INTRAMUSCULAR; INTRAVENOUS at 14:57

## 2017-09-29 RX ADMIN — ATRACURIUM BESYLATE 30 MG: 10 INJECTION, SOLUTION INTRAVENOUS at 15:12

## 2017-09-29 RX ADMIN — Medication 3 MG: at 15:57

## 2017-09-29 RX ADMIN — SODIUM CHLORIDE 125 ML/HR: 9 INJECTION, SOLUTION INTRAVENOUS at 12:42

## 2017-09-29 RX ADMIN — ATRACURIUM BESYLATE 10 MG: 10 INJECTION, SOLUTION INTRAVENOUS at 14:57

## 2017-09-29 RX ADMIN — HYDROMORPHONE HYDROCHLORIDE 0.5 MG: 1 INJECTION, SOLUTION INTRAMUSCULAR; INTRAVENOUS; SUBCUTANEOUS at 16:55

## 2017-09-29 RX ADMIN — DIAZEPAM 5 MG: 5 TABLET ORAL at 20:29

## 2017-09-29 RX ADMIN — FAMOTIDINE 20 MG: 10 INJECTION, SOLUTION INTRAVENOUS at 14:12

## 2017-09-29 RX ADMIN — GLYCOPYRROLATE 0.4 MG: 0.2 INJECTION, SOLUTION INTRAMUSCULAR; INTRAVENOUS at 15:57

## 2017-09-29 RX ADMIN — ONDANSETRON 4 MG: 2 INJECTION INTRAMUSCULAR; INTRAVENOUS at 12:04

## 2017-09-29 RX ADMIN — SODIUM CHLORIDE, POTASSIUM CHLORIDE, SODIUM LACTATE AND CALCIUM CHLORIDE: 600; 310; 30; 20 INJECTION, SOLUTION INTRAVENOUS at 14:53

## 2017-09-29 RX ADMIN — FENTANYL CITRATE 50 MCG: 50 INJECTION, SOLUTION INTRAMUSCULAR; INTRAVENOUS at 15:57

## 2017-09-29 RX ADMIN — DIATRIZOATE MEGLUMINE AND DIATRIZOATE SODIUM 15 ML: 660; 100 LIQUID ORAL; RECTAL at 06:47

## 2017-09-29 RX ADMIN — PROPOFOL 200 MG: 10 INJECTION, EMULSION INTRAVENOUS at 14:57

## 2017-09-29 RX ADMIN — ONDANSETRON 4 MG: 2 INJECTION INTRAMUSCULAR; INTRAVENOUS at 14:11

## 2017-09-29 RX ADMIN — ACETAMINOPHEN 1000 MG: 500 TABLET ORAL at 20:29

## 2017-09-29 RX ADMIN — MIDAZOLAM HYDROCHLORIDE 2 MG: 1 INJECTION, SOLUTION INTRAMUSCULAR; INTRAVENOUS at 14:53

## 2017-09-29 RX ADMIN — FENTANYL CITRATE 50 MCG: 50 INJECTION, SOLUTION INTRAMUSCULAR; INTRAVENOUS at 17:05

## 2017-09-29 RX ADMIN — FENTANYL CITRATE 50 MCG: 50 INJECTION, SOLUTION INTRAMUSCULAR; INTRAVENOUS at 14:57

## 2017-09-29 RX ADMIN — LIDOCAINE HYDROCHLORIDE 50 MG: 10 INJECTION, SOLUTION INFILTRATION; PERINEURAL at 14:57

## 2017-09-29 RX ADMIN — ONDANSETRON 8 MG: 2 INJECTION INTRAMUSCULAR; INTRAVENOUS at 06:14

## 2017-09-29 RX ADMIN — FENTANYL CITRATE 50 MCG: 50 INJECTION, SOLUTION INTRAMUSCULAR; INTRAVENOUS at 17:00

## 2017-09-29 RX ADMIN — MORPHINE SULFATE 2 MG: 2 INJECTION, SOLUTION INTRAMUSCULAR; INTRAVENOUS at 12:38

## 2017-09-29 RX ADMIN — SCOPALAMINE 1 PATCH: 1 PATCH, EXTENDED RELEASE TRANSDERMAL at 14:11

## 2017-09-29 RX ADMIN — IOPAMIDOL 95 ML: 612 INJECTION, SOLUTION INTRAVENOUS at 08:10

## 2017-09-29 RX ADMIN — SODIUM CHLORIDE 100 ML/HR: 9 INJECTION, SOLUTION INTRAVENOUS at 17:30

## 2017-09-29 RX ADMIN — DEXAMETHASONE SODIUM PHOSPHATE 8 MG: 4 INJECTION, SOLUTION INTRAMUSCULAR; INTRAVENOUS at 15:01

## 2017-09-29 RX ADMIN — PROPOFOL 25 MCG/KG/MIN: 10 INJECTION, EMULSION INTRAVENOUS at 15:00

## 2017-09-29 RX ADMIN — SODIUM CHLORIDE 125 ML/HR: 9 INJECTION, SOLUTION INTRAVENOUS at 06:11

## 2017-09-29 RX ADMIN — DEXAMETHASONE SODIUM PHOSPHATE 60 ML: 10 INJECTION, SOLUTION INTRAMUSCULAR; INTRAVENOUS at 15:01

## 2017-09-29 RX ADMIN — ONDANSETRON 4 MG: 2 INJECTION INTRAMUSCULAR; INTRAVENOUS at 15:50

## 2017-09-29 RX ADMIN — SODIUM CHLORIDE 500 ML: 9 INJECTION, SOLUTION INTRAVENOUS at 06:21

## 2017-09-29 RX ADMIN — PROMETHAZINE HYDROCHLORIDE 12.5 MG: 25 INJECTION INTRAMUSCULAR; INTRAVENOUS at 06:53

## 2017-09-29 NOTE — ANESTHESIA PROCEDURE NOTES
Airway  Urgency: elective    Airway not difficult    General Information and Staff    Patient location during procedure: OR  CRNA: NICOLAS WILKERSON    Indications and Patient Condition  Indications for airway management: airway protection    Preoxygenated: yes  MILS not maintained throughout  Mask difficulty assessment: 0 - not attempted (RSI)    Final Airway Details  Final airway type: endotracheal airway      Successful airway: ETT  Cuffed: yes   Successful intubation technique: direct laryngoscopy  Facilitating devices/methods: intubating stylet  Endotracheal tube insertion site: oral  Blade: Selene  Blade size: #3  ETT size: 7.5 mm  Cormack-Lehane Classification: grade I - full view of glottis  Placement verified by: chest auscultation and capnometry   Cuff volume (mL): 6  Measured from: lips  ETT to lips (cm): 20  Number of attempts at approach: 1    Additional Comments  Patient history, labs, physical exam, anesthetic plan reviewed with MDA and patient in preop.  Pt transported to room via RN. All ASA monitors applied, NG hooked to suction; preoxygenated x 3 min/ ETO2 of >85, IV patent, rapid sequence induction with cricoid pressure, easy intubation, + ETCO2, negative epigastric sounds, breath sound equal bilaterally with symmetric chest rise and fall, tube secured, all VSS, cspine neutrality maintained throughout induction, intubation and postitioning, all ppp, arms <90.

## 2017-09-29 NOTE — ANESTHESIA PROCEDURE NOTES
Peripheral Block    Patient location during procedure: OR  Reason for block: at surgeon's request and post-op pain management  Performed by  CRNA: NICOLAS WILKERSON  Assisted by: ION HOUSTON  Preanesthetic Checklist  Completed: patient identified, site marked, surgical consent, pre-op evaluation, timeout performed, IV checked, risks and benefits discussed and monitors and equipment checked  Prep:  Pt Position: supine  Sterile barriers:cap, gloves, sterile barriers and mask  Prep: ChloraPrep  Patient monitoring: blood pressure monitoring, continuous pulse oximetry and EKG  Procedure  Sedation:yes  Performed under: general  Guidance:ultrasound guided  Images:still images obtained    Laterality:Bilateral  Block Type:TAP  Injection Technique:single-shot  Needle Type:short-bevel and echogenic  Needle Gauge:20 G    Medications  Comment:Block Injection:  LA dose divided between Right and Left block       Adjuncts:  Decadron 4mg PSF, Buprenex 0.3mg (Per total volume of LA)  Local Injected:bupivacaine 0.25% Local Amount Injected:60mL  Post Assessment  Injection Assessment: negative aspiration for heme, incremental injection and no paresthesia on injection  Patient Tolerance:comfortable throughout block  Complications:no  Additional Notes      Under Ultrasound guidance, a BBraun 4inch 360 degree needle was advanced with Normal Saline hydro dissection of tissue.  The Internal Oblique and Transversus Abdominus muscles where visualized.  At or before the aponeurosis of Internal Oblique, local anesthetic spread was visualized in the Transversus Abdominus Plane. Injection was made incrementally with aspiration every 5 mls.  There was no  intravascular injection,  injection pressure was normal, there was no neural injection, and the procedure was completed without difficulty.  Thank You.

## 2017-09-29 NOTE — ANESTHESIA PREPROCEDURE EVALUATION
Anesthesia Evaluation     Patient summary reviewed and Nursing notes reviewed   NPO Solid Status: Waived due to emergency  NPO Liquid Status: Waived due to emergency     Airway   Mallampati: I  TM distance: >3 FB  Neck ROM: full  no difficulty expected  Dental      Pulmonary    (+) a smoker Former,   Cardiovascular     ECG reviewed      ROS comment: Sinus bradycardia  Otherwise normal ECG    Neuro/Psych  GI/Hepatic/Renal/Endo      ROS Comment: N and V     Musculoskeletal     Abdominal    Substance History      OB/GYN          Other   (+) arthritis (DDD)   history of cancer (rectal ca )        Phys Exam Other: NGT on sxn still Nauseated                              Anesthesia Plan    ASA 2 - emergent   (RSI Safe sequence intubation   TAP blocks  Prop INF,  Opioid sparing  Anti PONV   Aim to keep MAP>65)  intravenous induction   Anesthetic plan and risks discussed with patient.    Plan discussed with CRNA.

## 2017-09-29 NOTE — ANESTHESIA POSTPROCEDURE EVALUATION
Patient: Arthur Recinos    Procedure Summary     Date Anesthesia Start Anesthesia Stop Room / Location    09/29/17 1453  [facility] OR 02 / [facility]       Procedure Diagnosis Surgeon Provider    LAPAROTOMY EXPLORATORY, LYSIS OF ADHESIONS, REMOVAL OF MESH PLACED DURING PREVIOUS PROCEDURE, UMBILICAL HERNIA REPAIR (N/A Abdomen) No diagnosis on file. MD Salomon Harris MD          Anesthesia Type: No value filed.  Last vitals  /54       Temp 97.8       Pulse 57      Resp 20       SpO2 100%         Post Anesthesia Care and Evaluation    Patient location during evaluation: PACU  Patient participation: complete - patient participated  Level of consciousness: lethargic  Pain score: 0  Pain management: adequate  Airway patency: patent  Anesthetic complications: No anesthetic complications  PONV Status: none  Cardiovascular status: hemodynamically stable and acceptable  Respiratory status: nonlabored ventilation, acceptable and nasal cannula  Hydration status: acceptable

## 2017-09-30 LAB
ALBUMIN SERPL-MCNC: 3.7 G/DL (ref 3.2–4.8)
ANION GAP SERPL CALCULATED.3IONS-SCNC: 3 MMOL/L (ref 3–11)
BASOPHILS # BLD AUTO: 0 10*3/MM3 (ref 0–0.2)
BASOPHILS NFR BLD AUTO: 0 % (ref 0–1)
BUN BLD-MCNC: 18 MG/DL (ref 9–23)
BUN/CREAT SERPL: 16.4 (ref 7–25)
CALCIUM SPEC-SCNC: 8.7 MG/DL (ref 8.7–10.4)
CHLORIDE SERPL-SCNC: 105 MMOL/L (ref 99–109)
CO2 SERPL-SCNC: 28 MMOL/L (ref 20–31)
CREAT BLD-MCNC: 1.1 MG/DL (ref 0.6–1.3)
DEPRECATED RDW RBC AUTO: 45.7 FL (ref 37–54)
DEPRECATED RDW RBC AUTO: 45.9 FL (ref 37–54)
EOSINOPHIL # BLD AUTO: 0 10*3/MM3 (ref 0–0.3)
EOSINOPHIL NFR BLD AUTO: 0 % (ref 0–3)
ERYTHROCYTE [DISTWIDTH] IN BLOOD BY AUTOMATED COUNT: 13.6 % (ref 11.3–14.5)
ERYTHROCYTE [DISTWIDTH] IN BLOOD BY AUTOMATED COUNT: 13.7 % (ref 11.3–14.5)
GFR SERPL CREATININE-BSD FRML MDRD: 70 ML/MIN/1.73
GLUCOSE BLD-MCNC: 128 MG/DL (ref 70–100)
GLUCOSE BLDC GLUCOMTR-MCNC: 92 MG/DL (ref 70–130)
HCT VFR BLD AUTO: 36.8 % (ref 38.9–50.9)
HCT VFR BLD AUTO: 37.1 % (ref 38.9–50.9)
HGB BLD-MCNC: 12.5 G/DL (ref 13.1–17.5)
HGB BLD-MCNC: 12.5 G/DL (ref 13.1–17.5)
IMM GRANULOCYTES # BLD: 0.02 10*3/MM3 (ref 0–0.03)
IMM GRANULOCYTES NFR BLD: 0.2 % (ref 0–0.6)
LYMPHOCYTES # BLD AUTO: 0.67 10*3/MM3 (ref 0.6–4.8)
LYMPHOCYTES NFR BLD AUTO: 6.2 % (ref 24–44)
MCH RBC QN AUTO: 30.9 PG (ref 27–31)
MCH RBC QN AUTO: 31.1 PG (ref 27–31)
MCHC RBC AUTO-ENTMCNC: 33.7 G/DL (ref 32–36)
MCHC RBC AUTO-ENTMCNC: 34 G/DL (ref 32–36)
MCV RBC AUTO: 90.9 FL (ref 80–99)
MCV RBC AUTO: 92.3 FL (ref 80–99)
MONOCYTES # BLD AUTO: 0.53 10*3/MM3 (ref 0–1)
MONOCYTES NFR BLD AUTO: 4.9 % (ref 0–12)
NEUTROPHILS # BLD AUTO: 9.62 10*3/MM3 (ref 1.5–8.3)
NEUTROPHILS NFR BLD AUTO: 88.7 % (ref 41–71)
PHOSPHATE SERPL-MCNC: 3.9 MG/DL (ref 2.4–5.1)
PLATELET # BLD AUTO: 113 10*3/MM3 (ref 150–450)
PLATELET # BLD AUTO: 128 10*3/MM3 (ref 150–450)
PMV BLD AUTO: 10.7 FL (ref 6–12)
PMV BLD AUTO: 11.1 FL (ref 6–12)
POTASSIUM BLD-SCNC: 4.6 MMOL/L (ref 3.5–5.5)
RBC # BLD AUTO: 4.02 10*6/MM3 (ref 4.2–5.76)
RBC # BLD AUTO: 4.05 10*6/MM3 (ref 4.2–5.76)
SODIUM BLD-SCNC: 136 MMOL/L (ref 132–146)
WBC NRBC COR # BLD: 10.84 10*3/MM3 (ref 3.5–10.8)
WBC NRBC COR # BLD: 11.65 10*3/MM3 (ref 3.5–10.8)

## 2017-09-30 PROCEDURE — 85025 COMPLETE CBC W/AUTO DIFF WBC: CPT | Performed by: HOSPITALIST

## 2017-09-30 PROCEDURE — 25010000002 MORPHINE SULFATE (PF) 2 MG/ML SOLUTION: Performed by: COLON & RECTAL SURGERY

## 2017-09-30 PROCEDURE — 25010000002 HEPARIN (PORCINE) PER 1000 UNITS: Performed by: COLON & RECTAL SURGERY

## 2017-09-30 PROCEDURE — 25010000002 KETOROLAC TROMETHAMINE PER 15 MG: Performed by: COLON & RECTAL SURGERY

## 2017-09-30 PROCEDURE — 85027 COMPLETE CBC AUTOMATED: CPT | Performed by: HOSPITALIST

## 2017-09-30 PROCEDURE — 82962 GLUCOSE BLOOD TEST: CPT

## 2017-09-30 PROCEDURE — 80069 RENAL FUNCTION PANEL: CPT | Performed by: HOSPITALIST

## 2017-09-30 PROCEDURE — 99232 SBSQ HOSP IP/OBS MODERATE 35: CPT | Performed by: HOSPITALIST

## 2017-09-30 PROCEDURE — 25010000002 HYDROMORPHONE PER 4 MG: Performed by: HOSPITALIST

## 2017-09-30 RX ORDER — ONDANSETRON 2 MG/ML
4 INJECTION INTRAMUSCULAR; INTRAVENOUS EVERY 6 HOURS PRN
Status: COMPLETED | OUTPATIENT
Start: 2017-09-30 | End: 2017-10-02

## 2017-09-30 RX ORDER — PROMETHAZINE HYDROCHLORIDE 12.5 MG/1
12.5 TABLET ORAL EVERY 6 HOURS PRN
Status: DISCONTINUED | OUTPATIENT
Start: 2017-09-30 | End: 2017-09-30

## 2017-09-30 RX ORDER — MORPHINE SULFATE 2 MG/ML
2 INJECTION, SOLUTION INTRAMUSCULAR; INTRAVENOUS
Status: DISCONTINUED | OUTPATIENT
Start: 2017-09-30 | End: 2017-10-06

## 2017-09-30 RX ORDER — KETOROLAC TROMETHAMINE 30 MG/ML
30 INJECTION, SOLUTION INTRAMUSCULAR; INTRAVENOUS EVERY 6 HOURS PRN
Status: DISPENSED | OUTPATIENT
Start: 2017-09-30 | End: 2017-10-02

## 2017-09-30 RX ORDER — OXYCODONE AND ACETAMINOPHEN 10; 325 MG/1; MG/1
1 TABLET ORAL EVERY 6 HOURS PRN
Status: DISCONTINUED | OUTPATIENT
Start: 2017-09-30 | End: 2017-09-30

## 2017-09-30 RX ORDER — PROMETHAZINE HYDROCHLORIDE 25 MG/ML
12.5 INJECTION, SOLUTION INTRAMUSCULAR; INTRAVENOUS EVERY 6 HOURS PRN
Status: DISCONTINUED | OUTPATIENT
Start: 2017-09-30 | End: 2017-09-30

## 2017-09-30 RX ORDER — OXYCODONE HYDROCHLORIDE AND ACETAMINOPHEN 5; 325 MG/1; MG/1
1 TABLET ORAL EVERY 6 HOURS PRN
Status: DISCONTINUED | OUTPATIENT
Start: 2017-09-30 | End: 2017-09-30

## 2017-09-30 RX ORDER — HYDROMORPHONE HYDROCHLORIDE 1 MG/ML
0.5 INJECTION, SOLUTION INTRAMUSCULAR; INTRAVENOUS; SUBCUTANEOUS
Status: DISCONTINUED | OUTPATIENT
Start: 2017-09-30 | End: 2017-09-30

## 2017-09-30 RX ORDER — LORAZEPAM 2 MG/ML
1 INJECTION INTRAMUSCULAR EVERY 6 HOURS
Status: DISCONTINUED | OUTPATIENT
Start: 2017-09-30 | End: 2017-09-30

## 2017-09-30 RX ORDER — SODIUM CHLORIDE 9 MG/ML
50 INJECTION, SOLUTION INTRAVENOUS CONTINUOUS
Status: DISCONTINUED | OUTPATIENT
Start: 2017-09-30 | End: 2017-10-10

## 2017-09-30 RX ORDER — MORPHINE SULFATE 4 MG/ML
4 INJECTION, SOLUTION INTRAMUSCULAR; INTRAVENOUS
Status: DISCONTINUED | OUTPATIENT
Start: 2017-09-30 | End: 2017-09-30

## 2017-09-30 RX ORDER — LORAZEPAM 2 MG/ML
1 INJECTION INTRAMUSCULAR EVERY 6 HOURS PRN
Status: DISCONTINUED | OUTPATIENT
Start: 2017-09-30 | End: 2017-10-10 | Stop reason: HOSPADM

## 2017-09-30 RX ORDER — MORPHINE SULFATE 2 MG/ML
2 INJECTION, SOLUTION INTRAMUSCULAR; INTRAVENOUS
Status: DISCONTINUED | OUTPATIENT
Start: 2017-09-30 | End: 2017-09-30

## 2017-09-30 RX ADMIN — KETOROLAC TROMETHAMINE 15 MG: 30 INJECTION, SOLUTION INTRAMUSCULAR at 01:05

## 2017-09-30 RX ADMIN — SODIUM CHLORIDE 100 ML/HR: 9 INJECTION, SOLUTION INTRAVENOUS at 02:12

## 2017-09-30 RX ADMIN — KETOROLAC TROMETHAMINE 15 MG: 30 INJECTION, SOLUTION INTRAMUSCULAR at 18:15

## 2017-09-30 RX ADMIN — MORPHINE SULFATE 2 MG: 2 INJECTION, SOLUTION INTRAMUSCULAR; INTRAVENOUS at 10:37

## 2017-09-30 RX ADMIN — HYDROMORPHONE HYDROCHLORIDE 0.5 MG: 1 INJECTION, SOLUTION INTRAMUSCULAR; INTRAVENOUS; SUBCUTANEOUS at 16:25

## 2017-09-30 RX ADMIN — SODIUM CHLORIDE 125 ML/HR: 9 INJECTION, SOLUTION INTRAVENOUS at 12:02

## 2017-09-30 RX ADMIN — KETOROLAC TROMETHAMINE 15 MG: 30 INJECTION, SOLUTION INTRAMUSCULAR at 11:58

## 2017-09-30 RX ADMIN — OXYCODONE HYDROCHLORIDE AND ACETAMINOPHEN 1 TABLET: 10; 325 TABLET ORAL at 11:54

## 2017-09-30 RX ADMIN — HEPARIN SODIUM 5000 UNITS: 5000 INJECTION, SOLUTION INTRAVENOUS; SUBCUTANEOUS at 06:28

## 2017-09-30 RX ADMIN — SODIUM CHLORIDE 125 ML/HR: 9 INJECTION, SOLUTION INTRAVENOUS at 19:23

## 2017-09-30 RX ADMIN — ACETAMINOPHEN 1000 MG: 500 TABLET ORAL at 08:25

## 2017-09-30 RX ADMIN — DIAZEPAM 5 MG: 5 TABLET ORAL at 08:34

## 2017-09-30 RX ADMIN — HEPARIN SODIUM 5000 UNITS: 5000 INJECTION, SOLUTION INTRAVENOUS; SUBCUTANEOUS at 13:57

## 2017-09-30 RX ADMIN — MORPHINE SULFATE 2 MG: 2 INJECTION, SOLUTION INTRAMUSCULAR; INTRAVENOUS at 13:57

## 2017-09-30 RX ADMIN — ACETAMINOPHEN 1000 MG: 500 TABLET ORAL at 16:18

## 2017-09-30 RX ADMIN — SODIUM CHLORIDE 125 ML/HR: 9 INJECTION, SOLUTION INTRAVENOUS at 07:31

## 2017-09-30 RX ADMIN — KETOROLAC TROMETHAMINE 15 MG: 30 INJECTION, SOLUTION INTRAMUSCULAR at 06:28

## 2017-09-30 RX ADMIN — HYDROMORPHONE HYDROCHLORIDE 0.5 MG: 1 INJECTION, SOLUTION INTRAMUSCULAR; INTRAVENOUS; SUBCUTANEOUS at 21:24

## 2017-10-01 LAB
ALBUMIN SERPL-MCNC: 3.3 G/DL (ref 3.2–4.8)
ANION GAP SERPL CALCULATED.3IONS-SCNC: 4 MMOL/L (ref 3–11)
BASOPHILS # BLD AUTO: 0 10*3/MM3 (ref 0–0.2)
BASOPHILS NFR BLD AUTO: 0 % (ref 0–1)
BUN BLD-MCNC: 14 MG/DL (ref 9–23)
BUN/CREAT SERPL: 17.5 (ref 7–25)
CALCIUM SPEC-SCNC: 8.2 MG/DL (ref 8.7–10.4)
CHLORIDE SERPL-SCNC: 108 MMOL/L (ref 99–109)
CO2 SERPL-SCNC: 26 MMOL/L (ref 20–31)
CREAT BLD-MCNC: 0.8 MG/DL (ref 0.6–1.3)
DEPRECATED RDW RBC AUTO: 45.3 FL (ref 37–54)
EOSINOPHIL # BLD AUTO: 0.02 10*3/MM3 (ref 0–0.3)
EOSINOPHIL NFR BLD AUTO: 0.3 % (ref 0–3)
ERYTHROCYTE [DISTWIDTH] IN BLOOD BY AUTOMATED COUNT: 13.6 % (ref 11.3–14.5)
GFR SERPL CREATININE-BSD FRML MDRD: 102 ML/MIN/1.73
GLUCOSE BLD-MCNC: 89 MG/DL (ref 70–100)
HCT VFR BLD AUTO: 33.4 % (ref 38.9–50.9)
HGB BLD-MCNC: 11.3 G/DL (ref 13.1–17.5)
IMM GRANULOCYTES # BLD: 0.01 10*3/MM3 (ref 0–0.03)
IMM GRANULOCYTES NFR BLD: 0.1 % (ref 0–0.6)
LYMPHOCYTES # BLD AUTO: 0.65 10*3/MM3 (ref 0.6–4.8)
LYMPHOCYTES NFR BLD AUTO: 8.3 % (ref 24–44)
MAGNESIUM SERPL-MCNC: 1.7 MG/DL (ref 1.3–2.7)
MCH RBC QN AUTO: 30.7 PG (ref 27–31)
MCHC RBC AUTO-ENTMCNC: 33.8 G/DL (ref 32–36)
MCV RBC AUTO: 90.8 FL (ref 80–99)
MONOCYTES # BLD AUTO: 0.6 10*3/MM3 (ref 0–1)
MONOCYTES NFR BLD AUTO: 7.7 % (ref 0–12)
NEUTROPHILS # BLD AUTO: 6.54 10*3/MM3 (ref 1.5–8.3)
NEUTROPHILS NFR BLD AUTO: 83.6 % (ref 41–71)
PHOSPHATE SERPL-MCNC: 1.8 MG/DL (ref 2.4–5.1)
PLATELET # BLD AUTO: 86 10*3/MM3 (ref 150–450)
PMV BLD AUTO: 10.1 FL (ref 6–12)
POTASSIUM BLD-SCNC: 3.5 MMOL/L (ref 3.5–5.5)
POTASSIUM BLD-SCNC: 3.7 MMOL/L (ref 3.5–5.5)
RBC # BLD AUTO: 3.68 10*6/MM3 (ref 4.2–5.76)
SODIUM BLD-SCNC: 138 MMOL/L (ref 132–146)
WBC NRBC COR # BLD: 7.82 10*3/MM3 (ref 3.5–10.8)

## 2017-10-01 PROCEDURE — 25010000002 HEPARIN (PORCINE) PER 1000 UNITS: Performed by: COLON & RECTAL SURGERY

## 2017-10-01 PROCEDURE — 25010000002 MORPHINE SULFATE (PF) 2 MG/ML SOLUTION: Performed by: HOSPITALIST

## 2017-10-01 PROCEDURE — 25010000002 LORAZEPAM PER 2 MG: Performed by: COLON & RECTAL SURGERY

## 2017-10-01 PROCEDURE — 83735 ASSAY OF MAGNESIUM: CPT | Performed by: COLON & RECTAL SURGERY

## 2017-10-01 PROCEDURE — 25010000002 KETOROLAC TROMETHAMINE PER 15 MG: Performed by: COLON & RECTAL SURGERY

## 2017-10-01 PROCEDURE — 80069 RENAL FUNCTION PANEL: CPT | Performed by: HOSPITALIST

## 2017-10-01 PROCEDURE — 84132 ASSAY OF SERUM POTASSIUM: CPT | Performed by: INTERNAL MEDICINE

## 2017-10-01 PROCEDURE — 99232 SBSQ HOSP IP/OBS MODERATE 35: CPT | Performed by: NURSE PRACTITIONER

## 2017-10-01 PROCEDURE — 85025 COMPLETE CBC W/AUTO DIFF WBC: CPT | Performed by: COLON & RECTAL SURGERY

## 2017-10-01 PROCEDURE — 25010000002 HYDROMORPHONE PER 4 MG: Performed by: COLON & RECTAL SURGERY

## 2017-10-01 PROCEDURE — 25010000002 ONDANSETRON PER 1 MG: Performed by: COLON & RECTAL SURGERY

## 2017-10-01 RX ORDER — POTASSIUM CHLORIDE 750 MG/1
40 CAPSULE, EXTENDED RELEASE ORAL AS NEEDED
Status: DISCONTINUED | OUTPATIENT
Start: 2017-10-01 | End: 2017-10-10 | Stop reason: HOSPADM

## 2017-10-01 RX ORDER — POTASSIUM CHLORIDE 7.45 MG/ML
10 INJECTION INTRAVENOUS
Status: DISCONTINUED | OUTPATIENT
Start: 2017-10-01 | End: 2017-10-10 | Stop reason: HOSPADM

## 2017-10-01 RX ORDER — POTASSIUM CHLORIDE 1.5 G/1.77G
40 POWDER, FOR SOLUTION ORAL AS NEEDED
Status: DISCONTINUED | OUTPATIENT
Start: 2017-10-01 | End: 2017-10-10 | Stop reason: HOSPADM

## 2017-10-01 RX ORDER — MAGNESIUM SULFATE HEPTAHYDRATE 40 MG/ML
4 INJECTION, SOLUTION INTRAVENOUS AS NEEDED
Status: DISCONTINUED | OUTPATIENT
Start: 2017-10-01 | End: 2017-10-10 | Stop reason: HOSPADM

## 2017-10-01 RX ORDER — MAGNESIUM SULFATE HEPTAHYDRATE 40 MG/ML
2 INJECTION, SOLUTION INTRAVENOUS AS NEEDED
Status: DISCONTINUED | OUTPATIENT
Start: 2017-10-01 | End: 2017-10-10 | Stop reason: HOSPADM

## 2017-10-01 RX ADMIN — HYDROMORPHONE HYDROCHLORIDE 1 MG: 1 INJECTION, SOLUTION INTRAMUSCULAR; INTRAVENOUS; SUBCUTANEOUS at 14:42

## 2017-10-01 RX ADMIN — LORAZEPAM 1 MG: 2 INJECTION INTRAMUSCULAR; INTRAVENOUS at 11:17

## 2017-10-01 RX ADMIN — ACETAMINOPHEN 1000 MG: 500 TABLET ORAL at 20:07

## 2017-10-01 RX ADMIN — ACETAMINOPHEN 1000 MG: 500 TABLET ORAL at 08:18

## 2017-10-01 RX ADMIN — SODIUM CHLORIDE 125 ML/HR: 9 INJECTION, SOLUTION INTRAVENOUS at 02:30

## 2017-10-01 RX ADMIN — SODIUM CHLORIDE 125 ML/HR: 9 INJECTION, SOLUTION INTRAVENOUS at 10:06

## 2017-10-01 RX ADMIN — MORPHINE SULFATE 2 MG: 2 INJECTION, SOLUTION INTRAMUSCULAR; INTRAVENOUS at 22:42

## 2017-10-01 RX ADMIN — HYDROMORPHONE HYDROCHLORIDE 1 MG: 1 INJECTION, SOLUTION INTRAMUSCULAR; INTRAVENOUS; SUBCUTANEOUS at 20:56

## 2017-10-01 RX ADMIN — HYDROMORPHONE HYDROCHLORIDE 1 MG: 1 INJECTION, SOLUTION INTRAMUSCULAR; INTRAVENOUS; SUBCUTANEOUS at 08:19

## 2017-10-01 RX ADMIN — HEPARIN SODIUM 5000 UNITS: 5000 INJECTION, SOLUTION INTRAVENOUS; SUBCUTANEOUS at 21:34

## 2017-10-01 RX ADMIN — MAGNESIUM SULFATE HEPTAHYDRATE 4 G: 40 INJECTION, SOLUTION INTRAVENOUS at 11:17

## 2017-10-01 RX ADMIN — ACETAMINOPHEN 1000 MG: 500 TABLET ORAL at 16:07

## 2017-10-01 RX ADMIN — HEPARIN SODIUM 5000 UNITS: 5000 INJECTION, SOLUTION INTRAVENOUS; SUBCUTANEOUS at 14:42

## 2017-10-01 RX ADMIN — POTASSIUM CHLORIDE 40 MEQ: 750 CAPSULE, EXTENDED RELEASE ORAL at 16:06

## 2017-10-01 RX ADMIN — KETOROLAC TROMETHAMINE 30 MG: 30 INJECTION, SOLUTION INTRAMUSCULAR at 08:19

## 2017-10-01 RX ADMIN — POTASSIUM CHLORIDE 40 MEQ: 750 CAPSULE, EXTENDED RELEASE ORAL at 11:18

## 2017-10-01 RX ADMIN — ONDANSETRON 4 MG: 2 INJECTION INTRAMUSCULAR; INTRAVENOUS at 20:08

## 2017-10-01 RX ADMIN — SODIUM CHLORIDE 125 ML/HR: 9 INJECTION, SOLUTION INTRAVENOUS at 21:35

## 2017-10-01 NOTE — PROGRESS NOTES
Ongoing post-op ileus.  NGT placed for 600ml and 300 out overnight.  Pt. Expressing frustration with pain control.  Has been up and walking.  Not yet passing gas.    Exam:  Soft.  Mild distention.  Dressing clean and intact.  Imp:  S/p ex lap and MURIEL for SBO.  Now with ileus.  Have changed his pain meds around and increased dilaudid and added ativan and toradol.  Plan:  Awaiting return of bowel function.   Continue to encourge ambulation.   Have spent some time explaining all of this to the patient and he expressed understanding.

## 2017-10-01 NOTE — PLAN OF CARE
"Problem: Pain, Acute (Adult)  Goal: Identify Related Risk Factors and Signs and Symptoms  Outcome: Ongoing (interventions implemented as appropriate)    10/01/17 0411   Pain, Acute   Related Risk Factors (Acute Pain) patient perception;surgery   Signs and Symptoms (Acute Pain) verbalization of pain descriptors;facial mask of pain/grimace;pacing/restlessness       Goal: Acceptable Pain Control/Comfort Level  Outcome: Ongoing (interventions implemented as appropriate)    10/01/17 0411   Pain, Acute (Adult)   Acceptable Pain Control/Comfort Level making progress toward outcome         Problem: Patient Care Overview (Adult)  Goal: Plan of Care Review  Outcome: Ongoing (interventions implemented as appropriate)    10/01/17 0411   Coping/Psychosocial Response Interventions   Plan Of Care Reviewed With patient   Patient Care Overview   Progress no change   Outcome Evaluation   Outcome Summary/Follow up Plan Pt reports uncontrolled pain. New orders were obtained but Pt refused meds stating \"I just want to get out of here in the morning because I can get better pain meds on the street. Pt slept most of the shift and did not exhibit any nonverbal signs of discomfort. UOP was adequate. BP still low. Pt refused Heparin, Tylenol and morning labs.          Problem: Perioperative Period (Adult)  Goal: Signs and Symptoms of Listed Potential Problems Will be Absent or Manageable (Perioperative Period)    10/01/17 0411   Perioperative Period   Problems Assessed (Perioperative Period) all   Problems Present (Perioperative Period) pain           "

## 2017-10-01 NOTE — PROGRESS NOTES
"      HOSPITALIST DAILY PROGRESS NOTE    Chief Complaint: Follow up SBO    Subjective   SUBJECTIVE/OVERNIGHT EVENTS   Feels better than yesterday, small flatus  NG tube in place      Review of Systems:  General - No fevers, no chills  CVS - No chest pain, no palpitations  Resp - No cough, no dyspnea  GI - +Nausea, +abd pain and distention  Ext- no edema    Objective   OBJECTIVE   I have reviewed the vital signs.  /71  Pulse 69  Temp 98.7 °F (37.1 °C) (Oral)   Resp 18  Ht 69\" (175.3 cm)  Wt 165 lb (74.8 kg)  SpO2 96%  BMI 24.37 kg/m2    Physical Exam:  General - NAD, pt lying in bed comfortably, family at bedside  HEENT - EOMI, PERRL, oropharynx clear, hearing intact  CVS - RRR, S1 S2, no rubs, gallops or murmurs, 2s cap refill, no peripheral edema, 2+ pulses  Resp - CTAB, no crackles and wheezes   GI - +BS, soft, Moderately distended, minimal TTP  MSK - No joint pain or joint edema  Neuro - Awake and oriented, follows commands, interactive, moves all extremities equally    Results:      Results from last 7 days  Lab Units 10/01/17  0858 09/30/17  1924 09/30/17  0510   WBC 10*3/mm3 7.82 11.65* 10.84*   HEMOGLOBIN g/dL 11.3* 12.5* 12.5*   HEMATOCRIT % 33.4* 37.1* 36.8*   PLATELETS 10*3/mm3 86* 113* 128*       Results from last 7 days  Lab Units 10/01/17  0858   SODIUM mmol/L 138   POTASSIUM mmol/L 3.5   CHLORIDE mmol/L 108   CO2 mmol/L 26.0   BUN mg/dL 14   CREATININE mg/dL 0.80   GLUCOSE mg/dL 89   CALCIUM mg/dL 8.2*       Culture Data:  None      I have reviewed the medications.      Assessment/Plan   ASSESSMENT/PLAN    Principal Problem:    Small bowel obstruction  Active Problems:    Bradycardia    SBO (jejunal obstruction)  - Ex-lap 9/29 by Dr. Benjamin with removal of mesh, umbilical hernia repair, MURIEL  - Bowel rest with NGT to LWS  - Pain meds adjusted per Dr. Quinones 10.1.17  - Continue IVF    Post-op ileus     Sinus bradycardia, resolved  - Avoid AVN-blocking drugs    Leukocytosis, resolved    DVT " prophylaxis: SQHeparin  I expect patient to be discharged in: 3-4 days    FRANKIE Castro  10/01/17  4:05 PM

## 2017-10-02 LAB
DAT IGG GEL: POSITIVE
DAT POLY-SP REAG RBC QL: NORMAL
MAGNESIUM SERPL-MCNC: 2.2 MG/DL (ref 1.3–2.7)

## 2017-10-02 PROCEDURE — 83735 ASSAY OF MAGNESIUM: CPT | Performed by: INTERNAL MEDICINE

## 2017-10-02 PROCEDURE — 25010000002 ONDANSETRON PER 1 MG: Performed by: COLON & RECTAL SURGERY

## 2017-10-02 PROCEDURE — 25010000002 HEPARIN (PORCINE) PER 1000 UNITS: Performed by: COLON & RECTAL SURGERY

## 2017-10-02 PROCEDURE — 25010000002 HYDROMORPHONE PER 4 MG: Performed by: COLON & RECTAL SURGERY

## 2017-10-02 PROCEDURE — 25010000002 KETOROLAC TROMETHAMINE PER 15 MG: Performed by: COLON & RECTAL SURGERY

## 2017-10-02 PROCEDURE — 99232 SBSQ HOSP IP/OBS MODERATE 35: CPT | Performed by: INTERNAL MEDICINE

## 2017-10-02 RX ADMIN — KETOROLAC TROMETHAMINE 30 MG: 30 INJECTION, SOLUTION INTRAMUSCULAR at 10:52

## 2017-10-02 RX ADMIN — HYDROMORPHONE HYDROCHLORIDE 1 MG: 1 INJECTION, SOLUTION INTRAMUSCULAR; INTRAVENOUS; SUBCUTANEOUS at 08:25

## 2017-10-02 RX ADMIN — HYDROMORPHONE HYDROCHLORIDE 1 MG: 1 INJECTION, SOLUTION INTRAMUSCULAR; INTRAVENOUS; SUBCUTANEOUS at 11:57

## 2017-10-02 RX ADMIN — HYDROMORPHONE HYDROCHLORIDE 1 MG: 1 INJECTION, SOLUTION INTRAMUSCULAR; INTRAVENOUS; SUBCUTANEOUS at 14:15

## 2017-10-02 RX ADMIN — HYDROMORPHONE HYDROCHLORIDE 1 MG: 1 INJECTION, SOLUTION INTRAMUSCULAR; INTRAVENOUS; SUBCUTANEOUS at 04:00

## 2017-10-02 RX ADMIN — ONDANSETRON 4 MG: 2 INJECTION INTRAMUSCULAR; INTRAVENOUS at 20:08

## 2017-10-02 RX ADMIN — ACETAMINOPHEN 1000 MG: 500 TABLET ORAL at 16:41

## 2017-10-02 RX ADMIN — ACETAMINOPHEN 1000 MG: 500 TABLET ORAL at 08:01

## 2017-10-02 RX ADMIN — SODIUM CHLORIDE 125 ML/HR: 9 INJECTION, SOLUTION INTRAVENOUS at 05:20

## 2017-10-02 RX ADMIN — HEPARIN SODIUM 5000 UNITS: 5000 INJECTION, SOLUTION INTRAVENOUS; SUBCUTANEOUS at 20:08

## 2017-10-02 RX ADMIN — HYDROMORPHONE HYDROCHLORIDE 1 MG: 1 INJECTION, SOLUTION INTRAMUSCULAR; INTRAVENOUS; SUBCUTANEOUS at 17:17

## 2017-10-02 RX ADMIN — SODIUM CHLORIDE 125 ML/HR: 9 INJECTION, SOLUTION INTRAVENOUS at 13:03

## 2017-10-02 RX ADMIN — HEPARIN SODIUM 5000 UNITS: 5000 INJECTION, SOLUTION INTRAVENOUS; SUBCUTANEOUS at 14:15

## 2017-10-02 RX ADMIN — ONDANSETRON 4 MG: 2 INJECTION INTRAMUSCULAR; INTRAVENOUS at 13:13

## 2017-10-02 RX ADMIN — HEPARIN SODIUM 5000 UNITS: 5000 INJECTION, SOLUTION INTRAVENOUS; SUBCUTANEOUS at 05:21

## 2017-10-02 RX ADMIN — ONDANSETRON 4 MG: 2 INJECTION INTRAMUSCULAR; INTRAVENOUS at 05:26

## 2017-10-02 RX ADMIN — ACETAMINOPHEN 1000 MG: 500 TABLET ORAL at 20:09

## 2017-10-02 RX ADMIN — HYDROMORPHONE HYDROCHLORIDE 1 MG: 1 INJECTION, SOLUTION INTRAMUSCULAR; INTRAVENOUS; SUBCUTANEOUS at 20:09

## 2017-10-02 RX ADMIN — SODIUM CHLORIDE 150 ML/HR: 9 INJECTION, SOLUTION INTRAVENOUS at 20:44

## 2017-10-02 NOTE — PROGRESS NOTES
Continued Stay Note  [facility]     Patient Name: Arthur Recinos  MRN: [id number]  Today's Date: 10/2/2017    Admit Date: 9/29/2017          Discharge Plan       10/02/17 1518    Case Management/Social Work Plan    Plan discharge plan     Patient/Family In Agreement With Plan yes    Additional Comments Plan is home when medically ready for discharge. CM will cont to follow.              Discharge Codes     None        Expected Discharge Date and Time     Expected Discharge Date Expected Discharge Time    Oct 5, 2017             Henrietta Cheng RN

## 2017-10-02 NOTE — PLAN OF CARE
Problem: Pain, Acute (Adult)  Goal: Identify Related Risk Factors and Signs and Symptoms  Outcome: Ongoing (interventions implemented as appropriate)    10/02/17 0335   Pain, Acute   Related Risk Factors (Acute Pain) surgery   Signs and Symptoms (Acute Pain) verbalization of pain descriptors       Goal: Acceptable Pain Control/Comfort Level  Outcome: Ongoing (interventions implemented as appropriate)    10/02/17 0335   Pain, Acute (Adult)   Acceptable Pain Control/Comfort Level making progress toward outcome         Problem: Patient Care Overview (Adult)  Goal: Plan of Care Review    10/02/17 0335   Patient Care Overview   Progress improving

## 2017-10-02 NOTE — PROGRESS NOTES
"Colon and Rectal [[facility]]    POD # 3    /52  Pulse 56  Temp 98 °F (36.7 °C) (Oral)   Resp 18  Ht 69\" (175.3 cm)  Wt 165 lb (74.8 kg)  SpO2 96%  BMI 24.37 kg/m2    Lab Results (last 24 hours)     Procedure Component Value Units Date/Time    Potassium [[id number]]  (Normal) Collected:  10/01/17 2000    Specimen:  Blood Updated:  10/01/17 2021     Potassium 3.7 mmol/L     Magnesium [[id number]]  (Normal) Collected:  10/02/17 0814    Specimen:  Blood Updated:  10/02/17 0843     Magnesium 2.2 mg/dL           I/O this shift:  In: 990 [P.O.:60; I.V.:900; Other:30]  Out: 825 [Urine:525; Other:300]    Alert and oriented.  No nausea or vomiting.  Good pain control  Good UO. Labs stable  No flatus or stool yet.  Stable post op course.      Saul Benjamin MD  10/02/17  3:12 PM    "

## 2017-10-02 NOTE — PLAN OF CARE
Problem: Patient Care Overview (Adult)  Goal: Plan of Care Review  Outcome: Ongoing (interventions implemented as appropriate)    10/02/17 1852   Coping/Psychosocial Response Interventions   Plan Of Care Reviewed With patient   Patient Care Overview   Progress progress toward functional goals is gradual   Outcome Evaluation   Outcome Summary/Follow up Plan pt more cooperative today and upbeat of progression. pt able to ambulate in le and in room. pt pain meds controlled with new dosages from 10/01. Urine output has increased and received order to d/c f/c. nausea controlled with NG LWS and zofran. Large amount of outpout from NG tube. enc IS. VSS. No new concerns at this time.       Goal: Adult Individualization and Mutuality  Outcome: Ongoing (interventions implemented as appropriate)    10/02/17 1852   Individualization   Patient Specific Preferences pt prefers family to do bath when they are here, privacy   Patient Specific Goals home as soon as better   Patient Specific Interventions ambulate when pain meds, pain meds when avaiable   Mutuality/Individual Preferences   What Anxieties, Fears or Concerns Do You Have About Your Health or Care? none   What Questions Do You Have About Your Health or Care? none   What Information Would Help Us Give You More Personalized Care? none

## 2017-10-02 NOTE — PLAN OF CARE
Problem: Pain, Acute (Adult)  Intervention: Monitor/Manage Analgesia    10/01/17 0818 10/01/17 1830   Manage Acute Burn Pain   Bowel Intervention --  ambulation promoted;adequate fluid intake promoted;privacy promoted   Pain Management Interventions medicated;painful stimuli minimized;pillow support --    Safety Interventions   Medication Review/Management --  medications reviewed       Intervention: Mutually Develop/Implement Acute Pain Management Plan    10/01/17 0818 10/01/17 1830   Manage Acute Burn Pain   Pain Management Interventions medicated;painful stimuli minimized;pillow support --    Cognitive Interventions   Sensory Stimulation Regulation --  care clustered       Intervention: Support/Optimize Psychosocial Response to Acute Pain    10/01/17 0818   Coping Strategies   Trust Relationship/Rapport care explained   Diversional Activities television;smartphone   Family/Support System Care support provided;presence promoted   Supportive Measures active listening utilized;positive reinforcement provided         Goal: Identify Related Risk Factors and Signs and Symptoms  Outcome: Ongoing (interventions implemented as appropriate)    10/01/17 1830   Pain, Acute   Related Risk Factors (Acute Pain) surgery   Signs and Symptoms (Acute Pain) verbalization of pain descriptors       Goal: Acceptable Pain Control/Comfort Level  Outcome: Ongoing (interventions implemented as appropriate)    10/01/17 1830   Pain, Acute (Adult)   Acceptable Pain Control/Comfort Level making progress toward outcome         Problem: Patient Care Overview (Adult)  Goal: Plan of Care Review  Outcome: Ongoing (interventions implemented as appropriate)    10/01/17 1830   Coping/Psychosocial Response Interventions   Plan Of Care Reviewed With patient   Patient Care Overview   Progress improving   Outcome Evaluation   Outcome Summary/Follow up Plan pt agitated in beginning of shift and stated that he was ready to go home. After speaking with md  and this RN he was more cooperative. pt agreeable to try new dosage od pain meds and new ativan order. pt able to ambulate in room and one time in le as day progressed. pt states that his nausea is only intermentent at times. pt states that his pain has been better this shift and he looks more comfortable on asessment. Still has large amt of output from NG to LWS, clamped at times for meds. UOP has increased and adequate. pt seems more agreeable and cooperative toward end of shift and states he was able to rest today. VSS. No new concerns at this time,       Goal: Adult Individualization and Mutuality  Outcome: Ongoing (interventions implemented as appropriate)    10/01/17 1830   Individualization   Patient Specific Preferences patient wants family to help with bath, privacy   Patient Specific Goals home as soon as possible   Patient Specific Interventions pt and family to help with bath, pain meds when available   Mutuality/Individual Preferences   What Anxieties, Fears or Concerns Do You Have About Your Health or Care? pain- md davidson has spoken with the patient   What Questions Do You Have About Your Health or Care? none   What Information Would Help Us Give You More Personalized Care? none         Problem: Perioperative Period (Adult)  Intervention: Promote Pulmonary Hygiene and Secretion Clearance    10/01/17 0818 10/01/17 1700   Activity   Activity Type --  activity adjusted per tolerance   Promote Aggressive Pulmonary Hygiene/Secretion Management   Cough And Deep Breathing done independently per patient --    Incentive Spirometer   Administration (Incentive Spirometer) done independently per patient --    Positioning   Head Of Bed (HOB) Position HOB at 60 degrees --        Intervention: Promote Effective Elimination    10/01/17 1830   Manage Acute Burn Pain   Bowel Intervention ambulation promoted;adequate fluid intake promoted;privacy promoted   Genitourinary () Interventions   Urinary Elimination  Promotion toileting offered       Intervention: Monitor/Manage Pain    10/01/17 0818 10/01/17 1830   Manage Acute Burn Pain   Bowel Intervention --  ambulation promoted;adequate fluid intake promoted;privacy promoted   Pain Management Interventions medicated;painful stimuli minimized;pillow support --    Safety Interventions   Medication Review/Management --  medications reviewed       Intervention: Prevent/Manage Post-surgical Infection    10/01/17 1830   Safety Interventions   Infection Prevention rest/sleep promoted       Intervention: Prevent Lexi-procedural Injury    10/01/17 0818   Positioning   Positioning supine, head elevated   Head Of Bed (HOB) Position HOB at 60 degrees       Intervention: Prevent/Manage DVT/VTE Risk    10/01/17 1830   Support Surgical/Anesthesia Recovery   Venous Thromboembolism Prevent/Manage other (see comments)  (pt has on and off during shift, cont to enc)       Intervention: Monitor/Manage Postoperative Bleeding    10/01/17 1830   Safety Interventions   Bleeding Management dressing monitored         Goal: Signs and Symptoms of Listed Potential Problems Will be Absent or Manageable (Perioperative Period)  Outcome: Ongoing (interventions implemented as appropriate)    10/01/17 1830   Perioperative Period   Problems Assessed (Perioperative Period) all   Problems Present (Perioperative Period) pain

## 2017-10-02 NOTE — PLAN OF CARE
Problem: Pain, Acute (Adult)  Intervention: Monitor/Manage Analgesia    10/01/17 1830 10/02/17 1415   Manage Acute Burn Pain   Bowel Intervention ambulation promoted;adequate fluid intake promoted;privacy promoted --    Pain Management Interventions --  medicated;pillow support;relaxation techniques promoted   Safety Interventions   Medication Review/Management medications reviewed --        Intervention: Mutually Develop/Implement Acute Pain Management Plan    10/01/17 1830 10/02/17 1415   Manage Acute Burn Pain   Pain Management Interventions --  medicated;pillow support;relaxation techniques promoted   Cognitive Interventions   Sensory Stimulation Regulation care clustered --        Intervention: Support/Optimize Psychosocial Response to Acute Pain    10/02/17 0755   Coping Strategies   Trust Relationship/Rapport care explained   Diversional Activities smartphone;television   Family/Support System Care presence promoted   Supportive Measures active listening utilized;positive reinforcement provided         Goal: Identify Related Risk Factors and Signs and Symptoms  Outcome: Ongoing (interventions implemented as appropriate)    10/02/17 1830   Pain, Acute   Related Risk Factors (Acute Pain) surgery   Signs and Symptoms (Acute Pain) verbalization of pain descriptors       Goal: Acceptable Pain Control/Comfort Level  Outcome: Ongoing (interventions implemented as appropriate)    10/02/17 1830   Pain, Acute (Adult)   Acceptable Pain Control/Comfort Level making progress toward outcome         Problem: Perioperative Period (Adult)  Intervention: Promote Pulmonary Hygiene and Secretion Clearance    10/02/17 1830   Promote Aggressive Pulmonary Hygiene/Secretion Management   Cough And Deep Breathing done independently per patient   Positioning   Head Of Bed (HOB) Position HOB elevated   Activity   Activity Type activity adjusted per tolerance   Incentive Spirometer   Administration (Incentive Spirometer) done  independently per patient       Intervention: Promote Effective Elimination    10/02/17 1830   Manage Acute Burn Pain   Bowel Intervention ambulation promoted;adequate fluid intake promoted   Genitourinary () Interventions   Urinary Elimination Promotion toileting offered       Intervention: Monitor/Manage Pain    10/02/17 1830   Manage Acute Burn Pain   Bowel Intervention ambulation promoted;adequate fluid intake promoted   Pain Management Interventions pillow support;medicated   Safety Interventions   Medication Review/Management medications reviewed         Goal: Signs and Symptoms of Listed Potential Problems Will be Absent or Manageable (Perioperative Period)  Outcome: Ongoing (interventions implemented as appropriate)    10/02/17 1830   Perioperative Period   Problems Assessed (Perioperative Period) all   Problems Present (Perioperative Period) pain

## 2017-10-02 NOTE — PLAN OF CARE
Problem: Perioperative Period (Adult)  Intervention: Prevent/Manage Post-surgical Infection    10/02/17 1830   Safety Interventions   Infection Prevention rest/sleep promoted   Prevent/Manage Colorectal Surgical Infection   Fever Reduction/Comfort Measures medication administered       Intervention: Prevent Lexi-procedural Injury    10/02/17 1830   Positioning   Positioning supine, head elevated   Head Of Bed (HOB) Position HOB elevated       Intervention: Prevent/Manage DVT/VTE Risk    10/02/17 1157   Support Surgical/Anesthesia Recovery   Venous Thromboembolism Prevent/Manage patient refused intervention(s)  (pt ref teds and scuds at this time)       Intervention: Monitor/Manage Postoperative Bleeding    10/01/17 1830   Safety Interventions   Bleeding Management dressing monit       10/02/17 1939   Safety Interventions   Bleeding Management dressing monitored   manjit

## 2017-10-02 NOTE — PROGRESS NOTES
"      HOSPITALIST DAILY PROGRESS NOTE    Chief Complaint: Follow up SBO    Subjective   SUBJECTIVE/OVERNIGHT EVENTS   No bowel movements.  Remains constipated with obstipation.  NG output up to 700 mL today.  Minimal abdominal pain.  Afebrile.  No dyspnea, orthopnea or paroxysmal nocturnal dyspnea      Review of Systems:  General - No fevers, no chills  CVS - No chest pain, no palpitations  Resp - No cough, no dyspnea  GI - +Nausea, +abd pain and distention  Ext- no edema    Objective   OBJECTIVE   I have reviewed the vital signs.  /69  Pulse 66  Temp 98 °F (36.7 °C) (Oral)   Resp 18  Ht 69\" (175.3 cm)  Wt 165 lb (74.8 kg)  SpO2 97%  BMI 24.37 kg/m2    Physical Exam:  General - NAD, pt lying in bed comfortably, family at bedside  HEENT - EOMI, PERRL, oropharynx clear, hearing intact  CVS - RRR, S1 S2, no rubs, gallops or murmurs, 2s cap refill, no peripheral edema, 2+ pulses  Resp - CTAB, no crackles and wheezes   GI - +BS, soft, Moderately distended, minimal TTP  MSK - No joint pain or joint edema  Neuro - Awake and oriented, follows commands, interactive, moves all extremities equally    Results:      Results from last 7 days  Lab Units 10/01/17  0858 09/30/17 1924 09/30/17  0510   WBC 10*3/mm3 7.82 11.65* 10.84*   HEMOGLOBIN g/dL 11.3* 12.5* 12.5*   HEMATOCRIT % 33.4* 37.1* 36.8*   PLATELETS 10*3/mm3 86* 113* 128*       Results from last 7 days  Lab Units 10/01/17  2000 10/01/17  0858   SODIUM mmol/L  --  138   POTASSIUM mmol/L 3.7 3.5   CHLORIDE mmol/L  --  108   CO2 mmol/L  --  26.0   BUN mg/dL  --  14   CREATININE mg/dL  --  0.80   GLUCOSE mg/dL  --  89   CALCIUM mg/dL  --  8.2*       Culture Data:  None      I have reviewed the medications.      Assessment/Plan   ASSESSMENT/PLAN    Principal Problem:    Small bowel obstruction  Active Problems:    Bradycardia    SBO (jejunal obstruction)  - Ex-lap 9/29 by Dr. Benjamin with removal of mesh, umbilical hernia repair, MURIEL  - Bowel rest with NGT to " LWS  - Pain meds adjusted per Dr. Quinones 10.1.17  - Continue IVF    Post-op ileus, still no bowel movements. NG tube in place. IVF at 150 ml/hour. Monitor renal function and electrolytes     Sinus bradycardia, resolved  - Avoid AVN-blocking drugs    Leukocytosis, resolved    DVT prophylaxis: SQHeparin  I expect patient to be discharged in: 3-4 days    Brett Rodriguez MD  10/02/17  5:27 PM

## 2017-10-03 ENCOUNTER — APPOINTMENT (OUTPATIENT)
Dept: CT IMAGING | Facility: HOSPITAL | Age: 52
End: 2017-10-03

## 2017-10-03 LAB
ALBUMIN SERPL-MCNC: 3.3 G/DL (ref 3.2–4.8)
ANION GAP SERPL CALCULATED.3IONS-SCNC: 8 MMOL/L (ref 3–11)
BASOPHILS # BLD AUTO: 0.01 10*3/MM3 (ref 0–0.2)
BASOPHILS NFR BLD AUTO: 0.2 % (ref 0–1)
BUN BLD-MCNC: 10 MG/DL (ref 9–23)
BUN/CREAT SERPL: 12.5 (ref 7–25)
CALCIUM SPEC-SCNC: 8.6 MG/DL (ref 8.7–10.4)
CHLORIDE SERPL-SCNC: 106 MMOL/L (ref 99–109)
CO2 SERPL-SCNC: 23 MMOL/L (ref 20–31)
CREAT BLD-MCNC: 0.8 MG/DL (ref 0.6–1.3)
DEPRECATED RDW RBC AUTO: 45.1 FL (ref 37–54)
EOSINOPHIL # BLD AUTO: 0.11 10*3/MM3 (ref 0–0.3)
EOSINOPHIL NFR BLD AUTO: 2.1 % (ref 0–3)
ERYTHROCYTE [DISTWIDTH] IN BLOOD BY AUTOMATED COUNT: 13.6 % (ref 11.3–14.5)
GFR SERPL CREATININE-BSD FRML MDRD: 102 ML/MIN/1.73
GLUCOSE BLD-MCNC: 56 MG/DL (ref 70–100)
HCT VFR BLD AUTO: 34.6 % (ref 38.9–50.9)
HGB BLD-MCNC: 11.7 G/DL (ref 13.1–17.5)
IMM GRANULOCYTES # BLD: 0.01 10*3/MM3 (ref 0–0.03)
IMM GRANULOCYTES NFR BLD: 0.2 % (ref 0–0.6)
LYMPHOCYTES # BLD AUTO: 0.51 10*3/MM3 (ref 0.6–4.8)
LYMPHOCYTES NFR BLD AUTO: 9.9 % (ref 24–44)
MCH RBC QN AUTO: 31 PG (ref 27–31)
MCHC RBC AUTO-ENTMCNC: 33.8 G/DL (ref 32–36)
MCV RBC AUTO: 91.5 FL (ref 80–99)
MONOCYTES # BLD AUTO: 0.46 10*3/MM3 (ref 0–1)
MONOCYTES NFR BLD AUTO: 9 % (ref 0–12)
NEUTROPHILS # BLD AUTO: 4.03 10*3/MM3 (ref 1.5–8.3)
NEUTROPHILS NFR BLD AUTO: 78.6 % (ref 41–71)
PHOSPHATE SERPL-MCNC: 2.8 MG/DL (ref 2.4–5.1)
PLATELET # BLD AUTO: 98 10*3/MM3 (ref 150–450)
PMV BLD AUTO: 10.6 FL (ref 6–12)
POTASSIUM BLD-SCNC: 4.3 MMOL/L (ref 3.5–5.5)
RBC # BLD AUTO: 3.78 10*6/MM3 (ref 4.2–5.76)
SODIUM BLD-SCNC: 137 MMOL/L (ref 132–146)
WBC NRBC COR # BLD: 5.13 10*3/MM3 (ref 3.5–10.8)

## 2017-10-03 PROCEDURE — 99231 SBSQ HOSP IP/OBS SF/LOW 25: CPT | Performed by: INTERNAL MEDICINE

## 2017-10-03 PROCEDURE — 0 IOPAMIDOL 61 % SOLUTION: Performed by: INTERNAL MEDICINE

## 2017-10-03 PROCEDURE — 85025 COMPLETE CBC W/AUTO DIFF WBC: CPT | Performed by: INTERNAL MEDICINE

## 2017-10-03 PROCEDURE — 25010000002 MORPHINE SULFATE (PF) 2 MG/ML SOLUTION: Performed by: HOSPITALIST

## 2017-10-03 PROCEDURE — 74177 CT ABD & PELVIS W/CONTRAST: CPT

## 2017-10-03 PROCEDURE — 25010000002 ONDANSETRON PER 1 MG: Performed by: COLON & RECTAL SURGERY

## 2017-10-03 PROCEDURE — 25010000002 PIPERACILLIN SOD-TAZOBACTAM PER 1 G: Performed by: COLON & RECTAL SURGERY

## 2017-10-03 PROCEDURE — 25010000002 HEPARIN (PORCINE) PER 1000 UNITS: Performed by: COLON & RECTAL SURGERY

## 2017-10-03 PROCEDURE — 25010000002 LORAZEPAM PER 2 MG: Performed by: COLON & RECTAL SURGERY

## 2017-10-03 PROCEDURE — 80069 RENAL FUNCTION PANEL: CPT | Performed by: INTERNAL MEDICINE

## 2017-10-03 PROCEDURE — 25010000002 HYDROMORPHONE PER 4 MG: Performed by: COLON & RECTAL SURGERY

## 2017-10-03 RX ORDER — ONDANSETRON 2 MG/ML
4 INJECTION INTRAMUSCULAR; INTRAVENOUS EVERY 6 HOURS PRN
Status: DISCONTINUED | OUTPATIENT
Start: 2017-10-03 | End: 2017-10-10 | Stop reason: HOSPADM

## 2017-10-03 RX ADMIN — HYDROMORPHONE HYDROCHLORIDE 1 MG: 1 INJECTION, SOLUTION INTRAMUSCULAR; INTRAVENOUS; SUBCUTANEOUS at 13:16

## 2017-10-03 RX ADMIN — HYDROMORPHONE HYDROCHLORIDE 1 MG: 1 INJECTION, SOLUTION INTRAMUSCULAR; INTRAVENOUS; SUBCUTANEOUS at 10:58

## 2017-10-03 RX ADMIN — HYDROMORPHONE HYDROCHLORIDE 1 MG: 1 INJECTION, SOLUTION INTRAMUSCULAR; INTRAVENOUS; SUBCUTANEOUS at 05:19

## 2017-10-03 RX ADMIN — HYDROMORPHONE HYDROCHLORIDE 1 MG: 1 INJECTION, SOLUTION INTRAMUSCULAR; INTRAVENOUS; SUBCUTANEOUS at 16:07

## 2017-10-03 RX ADMIN — ACETAMINOPHEN 1000 MG: 500 TABLET ORAL at 07:49

## 2017-10-03 RX ADMIN — IOPAMIDOL 95 ML: 612 INJECTION, SOLUTION INTRAVENOUS at 19:48

## 2017-10-03 RX ADMIN — HEPARIN SODIUM 5000 UNITS: 5000 INJECTION, SOLUTION INTRAVENOUS; SUBCUTANEOUS at 13:16

## 2017-10-03 RX ADMIN — HYDROMORPHONE HYDROCHLORIDE 1 MG: 1 INJECTION, SOLUTION INTRAMUSCULAR; INTRAVENOUS; SUBCUTANEOUS at 01:27

## 2017-10-03 RX ADMIN — HEPARIN SODIUM 5000 UNITS: 5000 INJECTION, SOLUTION INTRAVENOUS; SUBCUTANEOUS at 21:56

## 2017-10-03 RX ADMIN — ACETAMINOPHEN 1000 MG: 500 TABLET ORAL at 21:54

## 2017-10-03 RX ADMIN — LORAZEPAM 1 MG: 2 INJECTION INTRAMUSCULAR; INTRAVENOUS at 20:21

## 2017-10-03 RX ADMIN — TAZOBACTAM SODIUM AND PIPERACILLIN SODIUM 3.38 G: 375; 3 INJECTION, SOLUTION INTRAVENOUS at 21:56

## 2017-10-03 RX ADMIN — Medication: at 18:15

## 2017-10-03 RX ADMIN — MORPHINE SULFATE 2 MG: 2 INJECTION, SOLUTION INTRAMUSCULAR; INTRAVENOUS at 21:54

## 2017-10-03 RX ADMIN — ONDANSETRON 4 MG: 2 INJECTION INTRAMUSCULAR; INTRAVENOUS at 16:03

## 2017-10-03 RX ADMIN — ONDANSETRON 4 MG: 2 INJECTION INTRAMUSCULAR; INTRAVENOUS at 21:55

## 2017-10-03 RX ADMIN — LORAZEPAM 1 MG: 2 INJECTION INTRAMUSCULAR; INTRAVENOUS at 01:28

## 2017-10-03 RX ADMIN — ONDANSETRON 4 MG: 2 INJECTION INTRAMUSCULAR; INTRAVENOUS at 08:41

## 2017-10-03 RX ADMIN — ACETAMINOPHEN 1000 MG: 500 TABLET ORAL at 16:07

## 2017-10-03 RX ADMIN — HEPARIN SODIUM 5000 UNITS: 5000 INJECTION, SOLUTION INTRAVENOUS; SUBCUTANEOUS at 05:18

## 2017-10-03 RX ADMIN — HYDROMORPHONE HYDROCHLORIDE 1 MG: 1 INJECTION, SOLUTION INTRAMUSCULAR; INTRAVENOUS; SUBCUTANEOUS at 20:21

## 2017-10-03 RX ADMIN — HYDROMORPHONE HYDROCHLORIDE 1 MG: 1 INJECTION, SOLUTION INTRAMUSCULAR; INTRAVENOUS; SUBCUTANEOUS at 07:49

## 2017-10-03 RX ADMIN — MORPHINE SULFATE 2 MG: 2 INJECTION, SOLUTION INTRAMUSCULAR; INTRAVENOUS at 17:12

## 2017-10-03 NOTE — PROGRESS NOTES
"Colon and Rectal [CSGA]    POD #4    /75  Pulse 57  Temp 97.9 °F (36.6 °C) (Axillary)   Resp 16  Ht 69\" (175.3 cm)  Wt 165 lb (74.8 kg)  SpO2 95%  BMI 24.37 kg/m2    Lab Results (last 24 hours)     Procedure Component Value Units Date/Time    CBC & Differential [[id number]] Collected:  10/03/17 0613    Specimen:  Blood Updated:  10/03/17 0641    Narrative:       The following orders were created for panel order CBC & Differential.  Procedure                               Abnormality         Status                     ---------                               -----------         ------                     CBC Auto Differential[[id number]]        Abnormal            Final result                 Please view results for these tests on the individual orders.    CBC Auto Differential [[id number]]  (Abnormal) Collected:  10/03/17 0613    Specimen:  Blood Updated:  10/03/17 0641     WBC 5.13 10*3/mm3      RBC 3.78 (L) 10*6/mm3      Hemoglobin 11.7 (L) g/dL      Hematocrit 34.6 (L) %      MCV 91.5 fL      MCH 31.0 pg      MCHC 33.8 g/dL      RDW 13.6 %      RDW-SD 45.1 fl      MPV 10.6 fL      Platelets 98 (L) 10*3/mm3      Neutrophil % 78.6 (H) %      Lymphocyte % 9.9 (L) %      Monocyte % 9.0 %      Eosinophil % 2.1 %      Basophil % 0.2 %      Immature Grans % 0.2 %      Neutrophils, Absolute 4.03 10*3/mm3      Lymphocytes, Absolute 0.51 (L) 10*3/mm3      Monocytes, Absolute 0.46 10*3/mm3      Eosinophils, Absolute 0.11 10*3/mm3      Basophils, Absolute 0.01 10*3/mm3      Immature Grans, Absolute 0.01 10*3/mm3     Renal Function Panel [[id number]]  (Abnormal) Collected:  10/03/17 0614    Specimen:  Blood Updated:  10/03/17 0648     Glucose 56 (L) mg/dL      BUN 10 mg/dL      Creatinine 0.80 mg/dL      Sodium 137 mmol/L      Potassium 4.3 mmol/L      Chloride 106 mmol/L      CO2 23.0 mmol/L      Calcium 8.6 (L) mg/dL      Albumin 3.30 g/dL      Phosphorus 2.8 mg/dL      Anion Gap 8.0 mmol/L      BUN/Creatinine Ratio " 12.5     eGFR Non African Amer 102 mL/min/1.73     Narrative:       National [employer] Guidelines    Stage     Description        GFR  1         Normal or High     90+  2         Mild decrease      60-89  3         Moderate decrease  30-59  4         Severe decrease    15-29  5         Kidney failure     <15          I/O this shift:  In: 30 [Other:30]  Out: 1525 [Urine:875; Other:650]    Alert and oriented.  No nausea or vomiting.Continued high NG output.  Abdomen still distended and tender.  Midline wound is reddened and probably infected.  No cellulitis.  White count is normal and he does not appear septic.  Good pain control  Good UO. Labs stable  Small amount of flatus and no stool.    Add Zosyn and check his CAT scan of his abdomen and pelvis.  Saul Benjamin MD  10/03/17  5:49 PM

## 2017-10-03 NOTE — PROGRESS NOTES
"      HOSPITALIST DAILY PROGRESS NOTE    Chief Complaint: Follow up SBO    Subjective   SUBJECTIVE/OVERNIGHT EVENTS   No bowel movements. Passing a little gas.   NG output up to 500 mL today.  Minimal abdominal pain.  Afebrile.  No dyspnea, orthopnea or paroxysmal nocturnal dyspnea      Review of Systems:  General - No fevers, no chills  CVS - No chest pain, no palpitations  Resp - No cough, no dyspnea  GI - +Nausea, +abd pain and distention  Ext- no edema    Objective   OBJECTIVE   I have reviewed the vital signs.  /75  Pulse 57  Temp 97.9 °F (36.6 °C) (Axillary)   Resp 16  Ht 69\" (175.3 cm)  Wt 165 lb (74.8 kg)  SpO2 95%  BMI 24.37 kg/m2    Physical Exam:  General - NAD, pt lying in bed comfortably, family at bedside  HEENT - EOMI, PERRL, oropharynx clear, hearing intact  CVS - RRR, S1 S2, no rubs, gallops or murmurs, 2s cap refill, no peripheral edema, 2+ pulses  Resp - CTAB, no crackles and wheezes   GI - +BS, soft, Moderately distended, minimal TTP. Redness at the tape site  MSK - No joint pain or joint edema  Neuro - Awake and oriented, follows commands, interactive, moves all extremities equally    Results:      Results from last 7 days  Lab Units 10/03/17  0613 10/01/17  0858 09/30/17  1924   WBC 10*3/mm3 5.13 7.82 11.65*   HEMOGLOBIN g/dL 11.7* 11.3* 12.5*   HEMATOCRIT % 34.6* 33.4* 37.1*   PLATELETS 10*3/mm3 98* 86* 113*       Results from last 7 days  Lab Units 10/03/17  0614   SODIUM mmol/L 137   POTASSIUM mmol/L 4.3   CHLORIDE mmol/L 106   CO2 mmol/L 23.0   BUN mg/dL 10   CREATININE mg/dL 0.80   GLUCOSE mg/dL 56*   CALCIUM mg/dL 8.6*       Culture Data:  None      I have reviewed the medications.      Assessment/Plan   ASSESSMENT/PLAN    Principal Problem:    Small bowel obstruction  Active Problems:    Bradycardia    Mechanical SBO  Status post ex-lap 9/29 by Dr. Benjamin with removal of mesh, umbilical hernia repair, MURIEL  Still no progress due to post op Ileus, currently managed by " colorectal surger    Post-op ileus, still no bowel movements. NG tube in place. IVF at 150 ml/hour. Monitor renal function and electrolytes.      Sinus bradycardia, resolved  - Avoid AVN-blocking drugs    Leukocytosis, resolved    DVT prophylaxis: SQHeparin  I expect patient to be discharged in: 3-4 days    Brett Rodriguez MD  10/03/17  12:51 PM

## 2017-10-03 NOTE — PLAN OF CARE
Problem: Pain, Acute (Adult)  Goal: Identify Related Risk Factors and Signs and Symptoms  Outcome: Outcome(s) achieved Date Met:  10/03/17    10/02/17 1830   Pain, Acute   Related Risk Factors (Acute Pain) surgery   Signs and Symptoms (Acute Pain) verbalization of pain descriptors       Goal: Acceptable Pain Control/Comfort Level  Outcome: Ongoing (interventions implemented as appropriate)    10/03/17 1517   Pain, Acute (Adult)   Acceptable Pain Control/Comfort Level making progress toward outcome         Problem: Patient Care Overview (Adult)  Goal: Plan of Care Review  Outcome: Ongoing (interventions implemented as appropriate)    10/03/17 0400 10/03/17 0800   Coping/Psychosocial Response Interventions   Plan Of Care Reviewed With --  patient   Patient Care Overview   Progress no change --        Goal: Adult Individualization and Mutuality  Outcome: Outcome(s) achieved Date Met:  10/03/17    10/02/17 1852   Individualization   Patient Specific Preferences pt prefers family to do bath when they are here, privacy   Patient Specific Goals home as soon as better   Patient Specific Interventions ambulate when pain meds, pain meds when avaiable   Mutuality/Individual Preferences   What Anxieties, Fears or Concerns Do You Have About Your Health or Care? none   What Questions Do You Have About Your Health or Care? none   What Information Would Help Us Give You More Personalized Care? none       Goal: Discharge Needs Assessment  Outcome: Ongoing (interventions implemented as appropriate)    09/29/17 0928 09/29/17 1346   Discharge Needs Assessment   Concerns To Be Addressed no discharge needs identified --    Readmission Within The Last 30 Days no previous admission in last 30 days --    Equipment Needed After Discharge none --    Current Health   Anticipated Changes Related to Illness none --    Living Environment   Transportation Available car;family or friend will provide --    Self-Care   Equipment Currently Used at  Home --  none         Problem: Perioperative Period (Adult)  Goal: Signs and Symptoms of Listed Potential Problems Will be Absent or Manageable (Perioperative Period)  Outcome: Ongoing (interventions implemented as appropriate)    10/03/17 1517   Perioperative Period   Problems Assessed (Perioperative Period) all   Problems Present (Perioperative Period) pain

## 2017-10-03 NOTE — PLAN OF CARE
Problem: Patient Care Overview (Adult)  Goal: Plan of Care Review  Outcome: Ongoing (interventions implemented as appropriate)    10/03/17 0400   Coping/Psychosocial Response Interventions   Plan Of Care Reviewed With patient   Patient Care Overview   Progress no change

## 2017-10-04 LAB
ALBUMIN SERPL-MCNC: 3.8 G/DL (ref 3.2–4.8)
ANION GAP SERPL CALCULATED.3IONS-SCNC: 10 MMOL/L (ref 3–11)
BASOPHILS # BLD AUTO: 0.01 10*3/MM3 (ref 0–0.2)
BASOPHILS NFR BLD AUTO: 0.2 % (ref 0–1)
BUN BLD-MCNC: 6 MG/DL (ref 9–23)
BUN/CREAT SERPL: 7.5 (ref 7–25)
CALCIUM SPEC-SCNC: 9.3 MG/DL (ref 8.7–10.4)
CHLORIDE SERPL-SCNC: 100 MMOL/L (ref 99–109)
CO2 SERPL-SCNC: 27 MMOL/L (ref 20–31)
CREAT BLD-MCNC: 0.8 MG/DL (ref 0.6–1.3)
DEPRECATED RDW RBC AUTO: 44.5 FL (ref 37–54)
EOSINOPHIL # BLD AUTO: 0.12 10*3/MM3 (ref 0–0.3)
EOSINOPHIL NFR BLD AUTO: 2.1 % (ref 0–3)
ERYTHROCYTE [DISTWIDTH] IN BLOOD BY AUTOMATED COUNT: 13.6 % (ref 11.3–14.5)
GFR SERPL CREATININE-BSD FRML MDRD: 102 ML/MIN/1.73
GLUCOSE BLD-MCNC: 67 MG/DL (ref 70–100)
HCT VFR BLD AUTO: 37.4 % (ref 38.9–50.9)
HGB BLD-MCNC: 12.6 G/DL (ref 13.1–17.5)
IMM GRANULOCYTES # BLD: 0.01 10*3/MM3 (ref 0–0.03)
IMM GRANULOCYTES NFR BLD: 0.2 % (ref 0–0.6)
LYMPHOCYTES # BLD AUTO: 0.73 10*3/MM3 (ref 0.6–4.8)
LYMPHOCYTES NFR BLD AUTO: 12.5 % (ref 24–44)
MCH RBC QN AUTO: 30.3 PG (ref 27–31)
MCHC RBC AUTO-ENTMCNC: 33.7 G/DL (ref 32–36)
MCV RBC AUTO: 89.9 FL (ref 80–99)
MONOCYTES # BLD AUTO: 0.57 10*3/MM3 (ref 0–1)
MONOCYTES NFR BLD AUTO: 9.7 % (ref 0–12)
NEUTROPHILS # BLD AUTO: 4.41 10*3/MM3 (ref 1.5–8.3)
NEUTROPHILS NFR BLD AUTO: 75.3 % (ref 41–71)
PHOSPHATE SERPL-MCNC: 3 MG/DL (ref 2.4–5.1)
PLATELET # BLD AUTO: 139 10*3/MM3 (ref 150–450)
PMV BLD AUTO: 10.7 FL (ref 6–12)
POTASSIUM BLD-SCNC: 3.5 MMOL/L (ref 3.5–5.5)
RBC # BLD AUTO: 4.16 10*6/MM3 (ref 4.2–5.76)
SODIUM BLD-SCNC: 137 MMOL/L (ref 132–146)
WBC NRBC COR # BLD: 5.85 10*3/MM3 (ref 3.5–10.8)

## 2017-10-04 PROCEDURE — 25010000002 HEPARIN (PORCINE) PER 1000 UNITS: Performed by: COLON & RECTAL SURGERY

## 2017-10-04 PROCEDURE — 25010000002 LORAZEPAM PER 2 MG: Performed by: COLON & RECTAL SURGERY

## 2017-10-04 PROCEDURE — 25010000002 ONDANSETRON PER 1 MG: Performed by: COLON & RECTAL SURGERY

## 2017-10-04 PROCEDURE — 25010000002 PIPERACILLIN SOD-TAZOBACTAM PER 1 G: Performed by: COLON & RECTAL SURGERY

## 2017-10-04 PROCEDURE — 87205 SMEAR GRAM STAIN: CPT | Performed by: COLON & RECTAL SURGERY

## 2017-10-04 PROCEDURE — 94799 UNLISTED PULMONARY SVC/PX: CPT

## 2017-10-04 PROCEDURE — 87070 CULTURE OTHR SPECIMN AEROBIC: CPT | Performed by: COLON & RECTAL SURGERY

## 2017-10-04 PROCEDURE — 80069 RENAL FUNCTION PANEL: CPT | Performed by: INTERNAL MEDICINE

## 2017-10-04 PROCEDURE — 25010000002 MORPHINE SULFATE (PF) 2 MG/ML SOLUTION: Performed by: HOSPITALIST

## 2017-10-04 PROCEDURE — 87077 CULTURE AEROBIC IDENTIFY: CPT | Performed by: COLON & RECTAL SURGERY

## 2017-10-04 PROCEDURE — 99231 SBSQ HOSP IP/OBS SF/LOW 25: CPT | Performed by: INTERNAL MEDICINE

## 2017-10-04 PROCEDURE — 87186 SC STD MICRODIL/AGAR DIL: CPT | Performed by: COLON & RECTAL SURGERY

## 2017-10-04 PROCEDURE — 85025 COMPLETE CBC W/AUTO DIFF WBC: CPT | Performed by: INTERNAL MEDICINE

## 2017-10-04 PROCEDURE — 25010000002 HYDROMORPHONE PER 4 MG: Performed by: COLON & RECTAL SURGERY

## 2017-10-04 RX ADMIN — TAZOBACTAM SODIUM AND PIPERACILLIN SODIUM 3.38 G: 375; 3 INJECTION, SOLUTION INTRAVENOUS at 09:34

## 2017-10-04 RX ADMIN — SODIUM CHLORIDE 150 ML/HR: 9 INJECTION, SOLUTION INTRAVENOUS at 15:59

## 2017-10-04 RX ADMIN — SODIUM CHLORIDE 150 ML/HR: 9 INJECTION, SOLUTION INTRAVENOUS at 09:36

## 2017-10-04 RX ADMIN — ACETAMINOPHEN 1000 MG: 500 TABLET ORAL at 15:58

## 2017-10-04 RX ADMIN — MORPHINE SULFATE 2 MG: 2 INJECTION, SOLUTION INTRAMUSCULAR; INTRAVENOUS at 10:50

## 2017-10-04 RX ADMIN — HEPARIN SODIUM 5000 UNITS: 5000 INJECTION, SOLUTION INTRAVENOUS; SUBCUTANEOUS at 21:31

## 2017-10-04 RX ADMIN — TAZOBACTAM SODIUM AND PIPERACILLIN SODIUM 3.38 G: 375; 3 INJECTION, SOLUTION INTRAVENOUS at 21:32

## 2017-10-04 RX ADMIN — ONDANSETRON 4 MG: 2 INJECTION INTRAMUSCULAR; INTRAVENOUS at 09:27

## 2017-10-04 RX ADMIN — HEPARIN SODIUM 5000 UNITS: 5000 INJECTION, SOLUTION INTRAVENOUS; SUBCUTANEOUS at 06:33

## 2017-10-04 RX ADMIN — MORPHINE SULFATE 2 MG: 2 INJECTION, SOLUTION INTRAMUSCULAR; INTRAVENOUS at 06:53

## 2017-10-04 RX ADMIN — ACETAMINOPHEN 1000 MG: 500 TABLET ORAL at 21:31

## 2017-10-04 RX ADMIN — MORPHINE SULFATE 2 MG: 2 INJECTION, SOLUTION INTRAMUSCULAR; INTRAVENOUS at 16:02

## 2017-10-04 RX ADMIN — HYDROMORPHONE HYDROCHLORIDE 1 MG: 1 INJECTION, SOLUTION INTRAMUSCULAR; INTRAVENOUS; SUBCUTANEOUS at 09:27

## 2017-10-04 RX ADMIN — HYDROMORPHONE HYDROCHLORIDE 1 MG: 1 INJECTION, SOLUTION INTRAMUSCULAR; INTRAVENOUS; SUBCUTANEOUS at 03:29

## 2017-10-04 RX ADMIN — ONDANSETRON 4 MG: 2 INJECTION INTRAMUSCULAR; INTRAVENOUS at 21:31

## 2017-10-04 RX ADMIN — ACETAMINOPHEN 1000 MG: 500 TABLET ORAL at 09:27

## 2017-10-04 RX ADMIN — TAZOBACTAM SODIUM AND PIPERACILLIN SODIUM 3.38 G: 375; 3 INJECTION, SOLUTION INTRAVENOUS at 04:00

## 2017-10-04 RX ADMIN — HYDROMORPHONE HYDROCHLORIDE 1 MG: 1 INJECTION, SOLUTION INTRAMUSCULAR; INTRAVENOUS; SUBCUTANEOUS at 23:31

## 2017-10-04 RX ADMIN — HYDROMORPHONE HYDROCHLORIDE 1 MG: 1 INJECTION, SOLUTION INTRAMUSCULAR; INTRAVENOUS; SUBCUTANEOUS at 21:31

## 2017-10-04 RX ADMIN — SODIUM CHLORIDE 150 ML/HR: 9 INJECTION, SOLUTION INTRAVENOUS at 21:31

## 2017-10-04 RX ADMIN — HYDROMORPHONE HYDROCHLORIDE 1 MG: 1 INJECTION, SOLUTION INTRAMUSCULAR; INTRAVENOUS; SUBCUTANEOUS at 14:16

## 2017-10-04 RX ADMIN — HYDROMORPHONE HYDROCHLORIDE 1 MG: 1 INJECTION, SOLUTION INTRAMUSCULAR; INTRAVENOUS; SUBCUTANEOUS at 17:39

## 2017-10-04 RX ADMIN — HEPARIN SODIUM 5000 UNITS: 5000 INJECTION, SOLUTION INTRAVENOUS; SUBCUTANEOUS at 14:16

## 2017-10-04 RX ADMIN — TAZOBACTAM SODIUM AND PIPERACILLIN SODIUM 3.38 G: 375; 3 INJECTION, SOLUTION INTRAVENOUS at 15:59

## 2017-10-04 RX ADMIN — LORAZEPAM 1 MG: 2 INJECTION INTRAMUSCULAR; INTRAVENOUS at 06:52

## 2017-10-04 NOTE — PLAN OF CARE
Problem: Pain, Acute (Adult)  Goal: Acceptable Pain Control/Comfort Level  Outcome: Ongoing (interventions implemented as appropriate)    10/04/17 1130   Pain, Acute (Adult)   Acceptable Pain Control/Comfort Level making progress toward outcome

## 2017-10-04 NOTE — PLAN OF CARE
Problem: Pain, Acute (Adult)  Goal: Acceptable Pain Control/Comfort Level  Outcome: Ongoing (interventions implemented as appropriate)    Problem: Patient Care Overview (Adult)  Goal: Plan of Care Review  Outcome: Ongoing (interventions implemented as appropriate)    10/03/17 0400 10/03/17 2021 10/04/17 0601   Coping/Psychosocial Response Interventions   Plan Of Care Reviewed With --  patient --    Patient Care Overview   Progress no change --  --    Outcome Evaluation   Outcome Summary/Follow up Plan --  --  VSS, pt had CT abd/pelvis, MD aware of results with plan to drain abscess bedside, pt having ng output, pain controlled with meds, new IV started, no other issues       Goal: Discharge Needs Assessment  Outcome: Ongoing (interventions implemented as appropriate)    Problem: Perioperative Period (Adult)  Goal: Signs and Symptoms of Listed Potential Problems Will be Absent or Manageable (Perioperative Period)  Outcome: Ongoing (interventions implemented as appropriate)

## 2017-10-04 NOTE — PROGRESS NOTES
"Colon and Rectal [CSGA]    POD # 5    /85 (BP Location: Right arm, Patient Position: Sitting)  Pulse 53  Temp 97.6 °F (36.4 °C) (Oral)   Resp 18  Ht 69\" (175.3 cm)  Wt 165 lb (74.8 kg)  SpO2 95%  BMI 24.37 kg/m2    Lab Results (last 24 hours)     Procedure Component Value Units Date/Time    Renal Function Panel [[id number]]  (Abnormal) Collected:  10/04/17 0659    Specimen:  Blood Updated:  10/04/17 0813     Glucose 67 (L) mg/dL      BUN 6 (L) mg/dL      Creatinine 0.80 mg/dL      Sodium 137 mmol/L      Potassium 3.5 mmol/L      Chloride 100 mmol/L      CO2 27.0 mmol/L      Calcium 9.3 mg/dL      Albumin 3.80 g/dL      Phosphorus 3.0 mg/dL      Anion Gap 10.0 mmol/L      BUN/Creatinine Ratio 7.5     eGFR Non African Amer 102 mL/min/1.73     Narrative:       National Kidney Foundation Guidelines    Stage     Description        GFR  1         Normal or High     90+  2         Mild decrease      60-89  3         Moderate decrease  30-59  4         Severe decrease    15-29  5         Kidney failure     <15    CBC & Differential [[id number]] Collected:  10/04/17 0659    Specimen:  Blood Updated:  10/04/17 0825    Narrative:       The following orders were created for panel order CBC & Differential.  Procedure                               Abnormality         Status                     ---------                               -----------         ------                     CBC Auto Differential[[id number]]        Abnormal            Final result                 Please view results for these tests on the individual orders.    CBC Auto Differential [[id number]]  (Abnormal) Collected:  10/04/17 0659    Specimen:  Blood Updated:  10/04/17 0825     WBC 5.85 10*3/mm3      RBC 4.16 (L) 10*6/mm3      Hemoglobin 12.6 (L) g/dL      Hematocrit 37.4 (L) %      MCV 89.9 fL      MCH 30.3 pg      MCHC 33.7 g/dL      RDW 13.6 %      RDW-SD 44.5 fl      MPV 10.7 fL      Platelets 139 (L) 10*3/mm3      Neutrophil % 75.3 (H) %      " Lymphocyte % 12.5 (L) %      Monocyte % 9.7 %      Eosinophil % 2.1 %      Basophil % 0.2 %      Immature Grans % 0.2 %      Neutrophils, Absolute 4.41 10*3/mm3      Lymphocytes, Absolute 0.73 10*3/mm3      Monocytes, Absolute 0.57 10*3/mm3      Eosinophils, Absolute 0.12 10*3/mm3      Basophils, Absolute 0.01 10*3/mm3      Immature Grans, Absolute 0.01 10*3/mm3           I/O this shift:  In: 1050 [I.V.:1000; IV Piggyback:50]  Out: 250 [Urine:250]    Alert and oriented.  No nausea or vomiting.Positive bowel movement and tolerating some food.  CT scan last night confirm the anterior abdominal wall abscess so I prepped the wound with alcohol, removed 3 skin staples and then popped into the abscess with a Q-tip and cultured it.  The nurse and I then flushed the wound out thoroughly with saline and put a gauze dressing into it followed by dressing.  Warned the patient that if he get a wound infection length issue often end up with a hernia later and they understood.  Good pain control  Good UO. Labs stable  Impression: Postop wound infection related to removal of the hernia mesh.  No evidence of bowel obstruction or intra-abdominal problem.  Wound has been opened, cultured and irrigated and repacked.  The overall cellulitis has decreased from yesterday since he has been on Zosyn.  Recommendation: Continue Zosyn until cultures are back.  Saul Benjamin MD  10/04/17  10:24 AM

## 2017-10-04 NOTE — PLAN OF CARE
Problem: Patient Care Overview (Adult)  Goal: Plan of Care Review  Outcome: Ongoing (interventions implemented as appropriate)    10/04/17 1129   Coping/Psychosocial Response Interventions   Plan Of Care Reviewed With patient;other (see comments)   Patient Care Overview   Progress no change       Goal: Discharge Needs Assessment  Outcome: Ongoing (interventions implemented as appropriate)

## 2017-10-04 NOTE — PROGRESS NOTES
"      HOSPITALIST DAILY PROGRESS NOTE    Chief Complaint: Follow up SBO    Subjective   SUBJECTIVE/OVERNIGHT EVENTS   Still no progress.  No bowel movements or gas passage.  No fever but subjective chills.  Nausea and high   NG output up to 500 mL today.  Intermittent abdominal pain of the mid abdomen rated as 8/10 intensity.  Afebrile.  No dyspnea, orthopnea or paroxysmal nocturnal dyspnea      Review of Systems:  General - No fevers, no chills  CVS - No chest pain, no palpitations  Resp - No cough, no dyspnea  GI - +Nausea, +abd pain and distention  Ext- no edema    Objective   OBJECTIVE   I have reviewed the vital signs.  /79 (BP Location: Left arm, Patient Position: Lying)  Pulse 64  Temp 98 °F (36.7 °C) (Oral)   Resp 18  Ht 69\" (175.3 cm)  Wt 165 lb (74.8 kg)  SpO2 95%  BMI 24.37 kg/m2    Physical Exam:  General - NAD, pt lying in bed comfortably, family at bedside  HEENT - EOMI, PERRL, oropharynx clear, hearing intact  CVS - RRR, S1 S2, no rubs, gallops or murmurs, 2s cap refill, no peripheral edema, 2+ pulses  Resp - CTAB, no crackles and wheezes   GI - Redness and tenderness surrounding the midline vertical incision with some fluctuation without rebound tenderness.  Positive bowel sounds  MSK - No joint pain or joint edema  Neuro - Awake and oriented, follows commands, interactive, moves all extremities equally    Results:      Results from last 7 days  Lab Units 10/04/17  0659 10/03/17  0613 10/01/17  0858   WBC 10*3/mm3 5.85 5.13 7.82   HEMOGLOBIN g/dL 12.6* 11.7* 11.3*   HEMATOCRIT % 37.4* 34.6* 33.4*   PLATELETS 10*3/mm3 139* 98* 86*       Results from last 7 days  Lab Units 10/04/17  0659   SODIUM mmol/L 137   POTASSIUM mmol/L 3.5   CHLORIDE mmol/L 100   CO2 mmol/L 27.0   BUN mg/dL 6*   CREATININE mg/dL 0.80   GLUCOSE mg/dL 67*   CALCIUM mg/dL 9.3       Culture Data:  None      I have reviewed the medications.      Assessment/Plan   ASSESSMENT/PLAN      Mechanical SBO  Status post " exploratory laparotomy, lysis of adhesions and removal of mesh placed during previous procedure with umbilical hernia repair on September 29, 2017 by  Dr. Benjamin   With complicated postoperative course including ileus with worsening abdominal pain and findings of abscess and phlegmon at the site of the wound on CT scan of the abdomen and pelvis on October 03, 2017   Now complicated by phlegmon underlying the midline incision with inflammatory reaction and adhesions of the small bowel and partial small bowel obstruction  Discussed with colorectal surgery.  On intravenous Zosyn.  Plan for drainage of phlegmon/abscess today  Nasogastric tube in place with ongoing high NG output on intravenous normal saline with 150 ML per hour         Sinus bradycardia, resolved.  Due to pain and narcotics  - Avoid AVN-blocking drugs    Leukocytosis, resolved    DVT prophylaxis: SQHeparin      Brett Rodriguez MD  10/04/17  8:44 AM

## 2017-10-05 PROBLEM — T81.49XA WOUND INFECTION AFTER SURGERY: Status: ACTIVE | Noted: 2017-10-05

## 2017-10-05 LAB — POTASSIUM BLD-SCNC: 3.6 MMOL/L (ref 3.5–5.5)

## 2017-10-05 PROCEDURE — 25010000002 ONDANSETRON PER 1 MG: Performed by: COLON & RECTAL SURGERY

## 2017-10-05 PROCEDURE — 25010000002 PIPERACILLIN SOD-TAZOBACTAM PER 1 G: Performed by: COLON & RECTAL SURGERY

## 2017-10-05 PROCEDURE — 25010000002 MORPHINE SULFATE (PF) 2 MG/ML SOLUTION: Performed by: HOSPITALIST

## 2017-10-05 PROCEDURE — 99232 SBSQ HOSP IP/OBS MODERATE 35: CPT | Performed by: INTERNAL MEDICINE

## 2017-10-05 PROCEDURE — 25010000002 HEPARIN (PORCINE) PER 1000 UNITS: Performed by: COLON & RECTAL SURGERY

## 2017-10-05 PROCEDURE — 84132 ASSAY OF SERUM POTASSIUM: CPT | Performed by: INTERNAL MEDICINE

## 2017-10-05 PROCEDURE — 25010000002 HYDROMORPHONE PER 4 MG: Performed by: COLON & RECTAL SURGERY

## 2017-10-05 RX ORDER — POLYETHYLENE GLYCOL 3350 17 G/17G
17 POWDER, FOR SOLUTION ORAL DAILY
Status: DISCONTINUED | OUTPATIENT
Start: 2017-10-05 | End: 2017-10-10

## 2017-10-05 RX ADMIN — HYDROMORPHONE HYDROCHLORIDE 1 MG: 1 INJECTION, SOLUTION INTRAMUSCULAR; INTRAVENOUS; SUBCUTANEOUS at 15:34

## 2017-10-05 RX ADMIN — ACETAMINOPHEN 1000 MG: 500 TABLET ORAL at 15:34

## 2017-10-05 RX ADMIN — HYDROMORPHONE HYDROCHLORIDE 1 MG: 1 INJECTION, SOLUTION INTRAMUSCULAR; INTRAVENOUS; SUBCUTANEOUS at 17:28

## 2017-10-05 RX ADMIN — POTASSIUM CHLORIDE 40 MEQ: 750 CAPSULE, EXTENDED RELEASE ORAL at 05:45

## 2017-10-05 RX ADMIN — TAZOBACTAM SODIUM AND PIPERACILLIN SODIUM 3.38 G: 375; 3 INJECTION, SOLUTION INTRAVENOUS at 22:02

## 2017-10-05 RX ADMIN — POLYETHYLENE GLYCOL 3350 17 G: 17 POWDER, FOR SOLUTION ORAL at 15:34

## 2017-10-05 RX ADMIN — HYDROMORPHONE HYDROCHLORIDE 1 MG: 1 INJECTION, SOLUTION INTRAMUSCULAR; INTRAVENOUS; SUBCUTANEOUS at 20:11

## 2017-10-05 RX ADMIN — HEPARIN SODIUM 5000 UNITS: 5000 INJECTION, SOLUTION INTRAVENOUS; SUBCUTANEOUS at 22:02

## 2017-10-05 RX ADMIN — HEPARIN SODIUM 5000 UNITS: 5000 INJECTION, SOLUTION INTRAVENOUS; SUBCUTANEOUS at 13:10

## 2017-10-05 RX ADMIN — TAZOBACTAM SODIUM AND PIPERACILLIN SODIUM 3.38 G: 375; 3 INJECTION, SOLUTION INTRAVENOUS at 04:12

## 2017-10-05 RX ADMIN — HYDROMORPHONE HYDROCHLORIDE 1 MG: 1 INJECTION, SOLUTION INTRAMUSCULAR; INTRAVENOUS; SUBCUTANEOUS at 09:31

## 2017-10-05 RX ADMIN — POTASSIUM CHLORIDE 40 MEQ: 750 CAPSULE, EXTENDED RELEASE ORAL at 11:46

## 2017-10-05 RX ADMIN — HYDROMORPHONE HYDROCHLORIDE 1 MG: 1 INJECTION, SOLUTION INTRAMUSCULAR; INTRAVENOUS; SUBCUTANEOUS at 08:42

## 2017-10-05 RX ADMIN — HYDROMORPHONE HYDROCHLORIDE 1 MG: 1 INJECTION, SOLUTION INTRAMUSCULAR; INTRAVENOUS; SUBCUTANEOUS at 11:46

## 2017-10-05 RX ADMIN — HEPARIN SODIUM 5000 UNITS: 5000 INJECTION, SOLUTION INTRAVENOUS; SUBCUTANEOUS at 05:45

## 2017-10-05 RX ADMIN — TAZOBACTAM SODIUM AND PIPERACILLIN SODIUM 3.38 G: 375; 3 INJECTION, SOLUTION INTRAVENOUS at 15:34

## 2017-10-05 RX ADMIN — ACETAMINOPHEN 1000 MG: 500 TABLET ORAL at 08:42

## 2017-10-05 RX ADMIN — HYDROMORPHONE HYDROCHLORIDE 1 MG: 1 INJECTION, SOLUTION INTRAMUSCULAR; INTRAVENOUS; SUBCUTANEOUS at 05:45

## 2017-10-05 RX ADMIN — HYDROMORPHONE HYDROCHLORIDE 1 MG: 1 INJECTION, SOLUTION INTRAMUSCULAR; INTRAVENOUS; SUBCUTANEOUS at 02:13

## 2017-10-05 RX ADMIN — TAZOBACTAM SODIUM AND PIPERACILLIN SODIUM 3.38 G: 375; 3 INJECTION, SOLUTION INTRAVENOUS at 08:42

## 2017-10-05 RX ADMIN — ACETAMINOPHEN 1000 MG: 500 TABLET ORAL at 20:11

## 2017-10-05 RX ADMIN — HYDROMORPHONE HYDROCHLORIDE 1 MG: 1 INJECTION, SOLUTION INTRAMUSCULAR; INTRAVENOUS; SUBCUTANEOUS at 22:18

## 2017-10-05 RX ADMIN — MORPHINE SULFATE 2 MG: 2 INJECTION, SOLUTION INTRAMUSCULAR; INTRAVENOUS at 11:46

## 2017-10-05 RX ADMIN — SODIUM CHLORIDE 150 ML/HR: 9 INJECTION, SOLUTION INTRAVENOUS at 15:33

## 2017-10-05 RX ADMIN — ONDANSETRON 4 MG: 2 INJECTION INTRAMUSCULAR; INTRAVENOUS at 13:11

## 2017-10-05 NOTE — PLAN OF CARE
Problem: Pain, Acute (Adult)  Goal: Acceptable Pain Control/Comfort Level  Outcome: Ongoing (interventions implemented as appropriate)    Problem: Patient Care Overview (Adult)  Goal: Plan of Care Review  Outcome: Ongoing (interventions implemented as appropriate)    Problem: Perioperative Period (Adult)  Goal: Signs and Symptoms of Listed Potential Problems Will be Absent or Manageable (Perioperative Period)  Outcome: Ongoing (interventions implemented as appropriate)

## 2017-10-05 NOTE — PROGRESS NOTES
Continued Stay Note  [facility]     Patient Name: Arthur Recinos  MRN: [id number]  Today's Date: 10/5/2017    Admit Date: 9/29/2017          Discharge Plan       10/05/17 1355    Case Management/Social Work Plan    Plan discharge plan    Patient/Family In Agreement With Plan unable to assess    Additional Comments Spoke with pt in room. Unsure of discharge needs at this time/pending progress. If HH needed, pt requests [facility] and will need order. CM will cont to follow.              Discharge Codes     None        Expected Discharge Date and Time     Expected Discharge Date Expected Discharge Time    Oct 8, 2017             Henrietta Cheng RN

## 2017-10-05 NOTE — PROGRESS NOTES
[facility] Medicine Services  INPATIENT PROGRESS NOTE    Date of Admission: 9/29/2017  Length of Stay: 6  Primary Care Physician: Megan Rose DO    Subjective   CC: f/u abdominal pain, post-op infection  HPI:  Continues to have abdominal pain and drainage from infection site. Bandage soaked this morning. Has been up walking around. Denies fevers. Says he thinks he has passed some gas, otherwise not much moving in his bowels.    Review Of Systems:   Review of Systems  Negative for fever,chills, chest pain, headache, shortness of breath, nausea, vomiting.    Otherwise 10 system ROS negative except as noted pertinent positives above in HPI.     Objective      Temp:  [97.6 °F (36.4 °C)-98.1 °F (36.7 °C)] 98.1 °F (36.7 °C)  Heart Rate:  [46-57] 46  Resp:  [16-17] 16  BP: (121-151)/(75-92) 151/79  Physical Exam  GEN:Patient is ill appearing  male, alert and oriented x3  HEENT: AT/NC, NG tube in place  NECK: Neck is without mass or JVD  CV: RRR no m/r/g, S1,S2  LUNGS: CTAB no w/r/r  ABD: surgical scar with surrounding erythema and tenderness, packing in place with drainage soaking through abdominal bandage  SKIN: no rashes, lesions  NEURO: no focal deficits  PSYCH: Mood is appropriate  EXT: no c/c/e/e    Results Review:    I have reviewed the labs, radiology results and diagnostic studies.      Results from last 7 days  Lab Units 10/04/17  0659   WBC 10*3/mm3 5.85   HEMOGLOBIN g/dL 12.6*   PLATELETS 10*3/mm3 139*       Results from last 7 days  Lab Units 10/05/17  0400 10/04/17  0659   SODIUM mmol/L  --  137   POTASSIUM mmol/L 3.6 3.5   CHLORIDE mmol/L  --  100   CO2 mmol/L  --  27.0   BUN mg/dL  --  6*   CREATININE mg/dL  --  0.80   GLUCOSE mg/dL  --  67*   CALCIUM mg/dL  --  9.3       Culture Data: Cultures:    Wound Culture   Date Value Ref Range Status   10/04/2017 Culture in progress  Preliminary       Radiology Data:     I have reviewed the medications.    Assessment/Plan      Problem List  Hospital Problem List     * (Principal)Wound infection after surgery    Bradycardia    Small bowel obstruction        Assessment/Plan:  1. Post- Operative Abscess s/p ex lap:  - s/p ex lap, lysis of adhesions and removal of mesh with umbilical hernia repair 9/29  - CT abd/pelvis showed presence of abscess along midline abdominal wall at incision site - I&D yesterday at bedside, sent for culture  - continue IV Zosyn  - further management per surgical team    2. Post-Op ileus/Parital SBO:  - NG in place, passing flatus, small amount of stool  -IV fluids, electrolyte replacment    3. Sinus Bradycardia:  - asymptomatic, avoid AVN blocking drugs    DVT prophylaxis: Heparin  Discharge Planning: I expect patient to be discharged COLBY Peleaz DO   10/05/17   1:21 PM

## 2017-10-05 NOTE — PROGRESS NOTES
Postop small bowel obstruction from hernia mesh.  Wound infection drained yesterday and looks much  today.  Responding nicely to Zosyn.  Antibiotics which when cultures back.  Some flatus and stool.  Abdomen much softer today.  He had a lot of stool above the obstruction so I'll add some MiraLAX.  Home when GI function improves and cultures back.  NG tube still in.

## 2017-10-05 NOTE — PLAN OF CARE
Problem: Pain, Acute (Adult)  Goal: Acceptable Pain Control/Comfort Level  Outcome: Ongoing (interventions implemented as appropriate)    Problem: Patient Care Overview (Adult)  Goal: Plan of Care Review  Outcome: Ongoing (interventions implemented as appropriate)    10/05/17 0334   Coping/Psychosocial Response Interventions   Plan Of Care Reviewed With patient   Patient Care Overview   Progress no change   Outcome Evaluation   Outcome Summary/Follow up Plan Pt still with intermittent abdominal pain, controlled with IV Dilaudid. NG to suction and Zofran for nausea. No new complaints. PT hopeful to have resolution of infection soon. Continue IV Zosyn for now. Recheck K+ today since it was 3.5 10/4.        Goal: Discharge Needs Assessment  Outcome: Ongoing (interventions implemented as appropriate)    Problem: Perioperative Period (Adult)  Goal: Signs and Symptoms of Listed Potential Problems Will be Absent or Manageable (Perioperative Period)  Outcome: Ongoing (interventions implemented as appropriate)

## 2017-10-05 NOTE — PROGRESS NOTES
"Adult Nutrition  Assessment/PES    Patient Name:  Arthur Recinos  YOB: 1965  MRN: [id number]  Admit Date:  9/29/2017    Assessment Date:  10/5/2017    Comments: RD initial nutrition assessment complete, pt seen for npo/clear liquid diet x5 days. Pt reports that he is tolerating clear liquids, still having nausea. Noted NG to intermittent LWS. Pt reports no solid foods x7 days, with 4 lb unintentional wt loss prior to admission. Will send Boost Breeze supplements 3x/day.     Rec peripheral PN- D8% 3.5%AA at 125 ml/hr with 100 ml 20% lipids daily, which will provide 80% of pt's kcal needs and 100% of pt's pro needs.     Will continue to follow.           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  No evidence of bowel obstruction or intra-abdominal problem.Wound has been opened, cultured and irrigated and repacked.  The overall cellulitis has decreased from yesterday since he has been on Zosyn.              Nutrition/Diet History       10/05/17 1326    Nutrition/Diet History    Reported/Observed By Patient    Reported GI Symptoms Nausea    Other pt reports drinking clear liquids, has not had solid foods since Thurs (9/28); NG to intermittent LWS. per I&O- NG outputs past 24 hrs= 650 ml            Anthropometrics       10/05/17 1328    Anthropometrics (Special Considerations)    Height Used for Calculations 1.753 m (5' 9\")    Weight Used for Calculations 74.8 kg (165 lb)   per standing scale on (9/29)    " "RD Calculated BMI (kg/m2) 24.4    Usual Body Weight (UBW)    Weight Loss 1.814 kg (4 lb)   pt reports 4# wt loss prior to hospital adm    Weight Loss Time Frame 1 week             Labs/Tests/Procedures/Meds       10/05/17 1329    Labs/Tests/Procedures/Meds    Labs/Tests Review Reviewed;K+;Glucose   replacing K+    Medication Review Reviewed, pertinent;Antiemetic              Estimated/Assessed Needs       10/05/17 1329    Calculation Measurements    Weight Used For Calculations 74.8 kg (165 lb)    Height Used for Calculations 1.753 m (5' 9\")    Estimated/Assessed Energy Needs    Energy Need Method [place]-St Jeor;Kcal/kg    kcal/kg 25    25 Kcal/Kg (kcal) 1871.1    Age 52    RMR ([employer]-St. Jeor Equation) 1588.82    Estimated/Assessed Protein Needs    Weight Used for Protein Calculation 74.8 kg (165 lb)    Protein (gm/kg) 1.2   1.2-1.5 g/kg=  g pro/day    1.2 Gm Protein (gm) 89.81            Nutrition Prescription Ordered       10/05/17 1330    Nutrition Prescription PO    Current PO Diet Clear Liquid   npo/clears x5 days            Evaluation of Received Nutrient/Fluid Intake       10/05/17 1330    PO Evaluation    Number of Days PO Intake Evaluated Insufficient Data            Problem/Interventions:        Problem 1       10/05/17 1330    Nutrition Diagnoses Problem 1    Problem 1 Inadequate Intake/Infusion    Inadequate Intake Type Oral    Etiology (related to) --   clinical condition, diet order, GI status    Signs/Symptoms (evidenced by) Clear Liquid Diet   post-op ileus, nausea                    Intervention Goal       10/05/17 1331    Intervention Goal    General Nutrition support treatment    PO Advance diet   as medically appropriate    TF/PN Inititiate TF/PN   rec supplemental PPN            Nutrition Intervention       10/05/17 1331    Nutrition Intervention    RD/Tech Action Care plan reviewd;Encourage intake;Recommend/ordered;Follow Tx progress    Recommended/Ordered Supplement;PN          "   Nutrition Prescription       10/05/17 1331    Nutrition Prescription PO    PO Prescription Begin/change supplement    Supplement Clear Liquid Supplement    Supplement Frequency 3 times a day    New PO Prescription Ordered? Yes    Nutrition Prescription PN    Parenteral Prescription PN begin/change;Parenteral to supply    PN Route Peripheral    Dextrose Concentration (%) 8 %    Amino Acid Concentration (%) 3.5%    Lipid Concentration (%) 20%    Lipid Volume (mL) 100 mL    Lipid Frequency Daily    New PN Prescription Ordered? No, recommended    PN to Supply    Kcal/Day 1436 Kcal/day    Protein (gm/day) 105 gm/day    Meet Estimated Kcal Need (%) 80 %    Meet Estimated Protein Need (%) 104 %            Education/Evaluation       10/05/17 1332    Monitor/Evaluation    Monitor Per protocol;PO intake;Supplement intake;Symptoms   GI status        Electronically signed by:  Nerissa Edward MS RD/LD [facility]  10/05/17 1:33 PM

## 2017-10-06 LAB
ANION GAP SERPL CALCULATED.3IONS-SCNC: 8 MMOL/L (ref 3–11)
BASOPHILS # BLD AUTO: 0.01 10*3/MM3 (ref 0–0.2)
BASOPHILS NFR BLD AUTO: 0.2 % (ref 0–1)
BUN BLD-MCNC: <5 MG/DL (ref 9–23)
BUN/CREAT SERPL: ABNORMAL (ref 7–25)
CALCIUM SPEC-SCNC: 8.4 MG/DL (ref 8.7–10.4)
CHLORIDE SERPL-SCNC: 101 MMOL/L (ref 99–109)
CO2 SERPL-SCNC: 28 MMOL/L (ref 20–31)
CREAT BLD-MCNC: 0.7 MG/DL (ref 0.6–1.3)
DEPRECATED RDW RBC AUTO: 43.1 FL (ref 37–54)
EOSINOPHIL # BLD AUTO: 0.13 10*3/MM3 (ref 0–0.3)
EOSINOPHIL NFR BLD AUTO: 3 % (ref 0–3)
ERYTHROCYTE [DISTWIDTH] IN BLOOD BY AUTOMATED COUNT: 13.4 % (ref 11.3–14.5)
GFR SERPL CREATININE-BSD FRML MDRD: 118 ML/MIN/1.73
GLUCOSE BLD-MCNC: 70 MG/DL (ref 70–100)
HCT VFR BLD AUTO: 33.8 % (ref 38.9–50.9)
HGB BLD-MCNC: 11.7 G/DL (ref 13.1–17.5)
IMM GRANULOCYTES # BLD: 0.01 10*3/MM3 (ref 0–0.03)
IMM GRANULOCYTES NFR BLD: 0.2 % (ref 0–0.6)
LYMPHOCYTES # BLD AUTO: 0.45 10*3/MM3 (ref 0.6–4.8)
LYMPHOCYTES NFR BLD AUTO: 10.4 % (ref 24–44)
MCH RBC QN AUTO: 30.7 PG (ref 27–31)
MCHC RBC AUTO-ENTMCNC: 34.6 G/DL (ref 32–36)
MCV RBC AUTO: 88.7 FL (ref 80–99)
MONOCYTES # BLD AUTO: 0.54 10*3/MM3 (ref 0–1)
MONOCYTES NFR BLD AUTO: 12.4 % (ref 0–12)
NEUTROPHILS # BLD AUTO: 3.2 10*3/MM3 (ref 1.5–8.3)
NEUTROPHILS NFR BLD AUTO: 73.8 % (ref 41–71)
PLATELET # BLD AUTO: 130 10*3/MM3 (ref 150–450)
PMV BLD AUTO: 10.1 FL (ref 6–12)
POTASSIUM BLD-SCNC: 3.5 MMOL/L (ref 3.5–5.5)
RBC # BLD AUTO: 3.81 10*6/MM3 (ref 4.2–5.76)
SODIUM BLD-SCNC: 137 MMOL/L (ref 132–146)
WBC NRBC COR # BLD: 4.34 10*3/MM3 (ref 3.5–10.8)

## 2017-10-06 PROCEDURE — 85025 COMPLETE CBC W/AUTO DIFF WBC: CPT | Performed by: INTERNAL MEDICINE

## 2017-10-06 PROCEDURE — 25010000002 PIPERACILLIN SOD-TAZOBACTAM PER 1 G: Performed by: COLON & RECTAL SURGERY

## 2017-10-06 PROCEDURE — 25010000002 ONDANSETRON PER 1 MG: Performed by: COLON & RECTAL SURGERY

## 2017-10-06 PROCEDURE — 25010000002 HYDROMORPHONE PER 4 MG: Performed by: COLON & RECTAL SURGERY

## 2017-10-06 PROCEDURE — 80048 BASIC METABOLIC PNL TOTAL CA: CPT | Performed by: INTERNAL MEDICINE

## 2017-10-06 PROCEDURE — 99232 SBSQ HOSP IP/OBS MODERATE 35: CPT | Performed by: INTERNAL MEDICINE

## 2017-10-06 PROCEDURE — 25010000002 HEPARIN (PORCINE) PER 1000 UNITS: Performed by: COLON & RECTAL SURGERY

## 2017-10-06 RX ORDER — OXYCODONE HYDROCHLORIDE 5 MG/1
10 TABLET ORAL EVERY 4 HOURS PRN
Status: DISCONTINUED | OUTPATIENT
Start: 2017-10-06 | End: 2017-10-10 | Stop reason: HOSPADM

## 2017-10-06 RX ORDER — OXYCODONE HYDROCHLORIDE 5 MG/1
5 TABLET ORAL EVERY 4 HOURS PRN
Status: DISCONTINUED | OUTPATIENT
Start: 2017-10-06 | End: 2017-10-10 | Stop reason: HOSPADM

## 2017-10-06 RX ADMIN — TAZOBACTAM SODIUM AND PIPERACILLIN SODIUM 3.38 G: 375; 3 INJECTION, SOLUTION INTRAVENOUS at 20:46

## 2017-10-06 RX ADMIN — HEPARIN SODIUM 5000 UNITS: 5000 INJECTION, SOLUTION INTRAVENOUS; SUBCUTANEOUS at 14:01

## 2017-10-06 RX ADMIN — HYDROMORPHONE HYDROCHLORIDE 1 MG: 1 INJECTION, SOLUTION INTRAMUSCULAR; INTRAVENOUS; SUBCUTANEOUS at 10:26

## 2017-10-06 RX ADMIN — OXYCODONE HYDROCHLORIDE 5 MG: 5 TABLET ORAL at 12:37

## 2017-10-06 RX ADMIN — HEPARIN SODIUM 5000 UNITS: 5000 INJECTION, SOLUTION INTRAVENOUS; SUBCUTANEOUS at 20:45

## 2017-10-06 RX ADMIN — HYDROMORPHONE HYDROCHLORIDE 1 MG: 1 INJECTION, SOLUTION INTRAMUSCULAR; INTRAVENOUS; SUBCUTANEOUS at 12:37

## 2017-10-06 RX ADMIN — TAZOBACTAM SODIUM AND PIPERACILLIN SODIUM 3.38 G: 375; 3 INJECTION, SOLUTION INTRAVENOUS at 03:41

## 2017-10-06 RX ADMIN — HEPARIN SODIUM 5000 UNITS: 5000 INJECTION, SOLUTION INTRAVENOUS; SUBCUTANEOUS at 05:56

## 2017-10-06 RX ADMIN — ACETAMINOPHEN 1000 MG: 500 TABLET ORAL at 20:44

## 2017-10-06 RX ADMIN — POLYETHYLENE GLYCOL 3350 17 G: 17 POWDER, FOR SOLUTION ORAL at 09:02

## 2017-10-06 RX ADMIN — HYDROMORPHONE HYDROCHLORIDE 1 MG: 1 INJECTION, SOLUTION INTRAMUSCULAR; INTRAVENOUS; SUBCUTANEOUS at 03:42

## 2017-10-06 RX ADMIN — ACETAMINOPHEN 1000 MG: 500 TABLET ORAL at 15:22

## 2017-10-06 RX ADMIN — HYDROMORPHONE HYDROCHLORIDE 1 MG: 1 INJECTION, SOLUTION INTRAMUSCULAR; INTRAVENOUS; SUBCUTANEOUS at 15:22

## 2017-10-06 RX ADMIN — HYDROMORPHONE HYDROCHLORIDE 1 MG: 1 INJECTION, SOLUTION INTRAMUSCULAR; INTRAVENOUS; SUBCUTANEOUS at 18:41

## 2017-10-06 RX ADMIN — ONDANSETRON 4 MG: 2 INJECTION INTRAMUSCULAR; INTRAVENOUS at 17:52

## 2017-10-06 RX ADMIN — ONDANSETRON 4 MG: 2 INJECTION INTRAMUSCULAR; INTRAVENOUS at 08:00

## 2017-10-06 RX ADMIN — HYDROMORPHONE HYDROCHLORIDE 1 MG: 1 INJECTION, SOLUTION INTRAMUSCULAR; INTRAVENOUS; SUBCUTANEOUS at 20:45

## 2017-10-06 RX ADMIN — ACETAMINOPHEN 1000 MG: 500 TABLET ORAL at 09:02

## 2017-10-06 RX ADMIN — TAZOBACTAM SODIUM AND PIPERACILLIN SODIUM 3.38 G: 375; 3 INJECTION, SOLUTION INTRAVENOUS at 09:03

## 2017-10-06 RX ADMIN — HYDROMORPHONE HYDROCHLORIDE 1 MG: 1 INJECTION, SOLUTION INTRAMUSCULAR; INTRAVENOUS; SUBCUTANEOUS at 08:01

## 2017-10-06 RX ADMIN — TAZOBACTAM SODIUM AND PIPERACILLIN SODIUM 3.38 G: 375; 3 INJECTION, SOLUTION INTRAVENOUS at 15:23

## 2017-10-06 NOTE — PROGRESS NOTES
[facility] Medicine Services  INPATIENT PROGRESS NOTE    Date of Admission: 9/29/2017  Length of Stay: 7  Primary Care Physician: Megan Rose,     Subjective   CC: f/u abdominal pain, post-op infection  HPI:  Doing better today. Surgery planning to remove NG tube today. Passing flatus and having BMs. Wants to eat. Denies N/V. Abdominal pain improving.    Review Of Systems:   Review of Systems  Negative for fever,chills, chest pain, headache, shortness of breath, nausea, vomiting.    Otherwise 10 system ROS negative except as noted pertinent positives above in HPI.     Objective      Temp:  [97.5 °F (36.4 °C)-98 °F (36.7 °C)] 97.8 °F (36.6 °C)  Heart Rate:  [46-59] 51  Resp:  [14-18] 18  BP: (100-151)/(59-94) 100/59  Physical Exam  GEN:Patient is ill appearing  male, alert and oriented x3  HEENT: AT/NC, NG tube in place  NECK: Neck is without mass or JVD  CV: RRR no m/r/g, S1,S2  LUNGS: CTAB no w/r/r  ABD: abdominal wound appears much better today, erythema significantly improved, still having some drainage from site. Distention improved  SKIN: no rashes, lesions  NEURO: no focal deficits  PSYCH: Mood is appropriate  EXT: no c/c/e/e    Results Review:    I have reviewed the labs, radiology results and diagnostic studies.      Results from last 7 days  Lab Units 10/06/17  0507   WBC 10*3/mm3 4.34   HEMOGLOBIN g/dL 11.7*   PLATELETS 10*3/mm3 130*       Results from last 7 days  Lab Units 10/06/17  0507   SODIUM mmol/L 137   POTASSIUM mmol/L 3.5   CHLORIDE mmol/L 101   CO2 mmol/L 28.0   BUN mg/dL <5*   CREATININE mg/dL 0.70   GLUCOSE mg/dL 70   CALCIUM mg/dL 8.4*       Culture Data: Cultures:    Wound Culture   Date Value Ref Range Status   10/04/2017 Culture in progress  Preliminary       Radiology Data:     I have reviewed the medications.    Assessment/Plan     Problem List  Hospital Problem List     * (Principal)Wound infection after surgery    Bradycardia    Small bowel  obstruction        Assessment/Plan:  1. Post- Operative Abscess s/p ex lap:  - s/p ex lap, lysis of adhesions and removal of mesh with umbilical hernia repair 9/29  - CT abd/pelvis showed presence of abscess along midline abdominal wall at incision site - I&D 10/4 at bedside, Cx growing GN bacilli and lactose , awaiting final speciation  - continue IV Zosyn  - further management per surgical team    2. Post-Op ileus/Parital SBO:  -NG tube removal today per surgery, advance to full liquids  -IV fluids, electrolyte replacment    3. Sinus Bradycardia:  - asymptomatic, avoid AVN blocking drugs    DVT prophylaxis: Heparin  Discharge Planning: I expect patient to be discharged TBLADY Pelaez DO   10/06/17   12:13 PM

## 2017-10-06 NOTE — PROGRESS NOTES
Continued Stay Note  [facility]     Patient Name: Arthur Recinos  MRN: [id number]  Today's Date: 10/6/2017    Admit Date: 9/29/2017          Discharge Plan       10/06/17 1421    Case Management/Social Work Plan    Plan discharge plan    Patient/Family In Agreement With Plan yes    Additional Comments CM will cont to follow for discharge needs.                Discharge Codes     None        Expected Discharge Date and Time     Expected Discharge Date Expected Discharge Time    Oct 8, 2017             Henrietta Cheng RN

## 2017-10-06 NOTE — PROGRESS NOTES
Improving  BM and gas  Low residuals  Wound looks good    Full liquids  D/cNGT  Po pain meds  Decrease IVFs  Ambulate

## 2017-10-06 NOTE — PLAN OF CARE
Problem: Pain, Acute (Adult)  Goal: Acceptable Pain Control/Comfort Level  Outcome: Ongoing (interventions implemented as appropriate)    10/06/17 0405   Pain, Acute (Adult)   Acceptable Pain Control/Comfort Level making progress toward outcome         Problem: Patient Care Overview (Adult)  Goal: Plan of Care Review  Outcome: Ongoing (interventions implemented as appropriate)    10/06/17 0405   Coping/Psychosocial Response Interventions   Plan Of Care Reviewed With patient   Patient Care Overview   Progress improving   Outcome Evaluation   Outcome Summary/Follow up Plan Patient c/o pain and was given PRN dilaudid. Patient did have large BM during early AM hours. VSS. Waiting on culturtes to come back.

## 2017-10-07 LAB
BACTERIA SPEC AEROBE CULT: ABNORMAL
BASOPHILS # BLD AUTO: 0.01 10*3/MM3 (ref 0–0.2)
BASOPHILS NFR BLD AUTO: 0.3 % (ref 0–1)
DEPRECATED RDW RBC AUTO: 44.7 FL (ref 37–54)
EOSINOPHIL # BLD AUTO: 0.14 10*3/MM3 (ref 0–0.3)
EOSINOPHIL NFR BLD AUTO: 3.5 % (ref 0–3)
ERYTHROCYTE [DISTWIDTH] IN BLOOD BY AUTOMATED COUNT: 13.7 % (ref 11.3–14.5)
GRAM STN SPEC: ABNORMAL
GRAM STN SPEC: ABNORMAL
HCT VFR BLD AUTO: 34.8 % (ref 38.9–50.9)
HGB BLD-MCNC: 12 G/DL (ref 13.1–17.5)
IMM GRANULOCYTES # BLD: 0.02 10*3/MM3 (ref 0–0.03)
IMM GRANULOCYTES NFR BLD: 0.5 % (ref 0–0.6)
LYMPHOCYTES # BLD AUTO: 0.53 10*3/MM3 (ref 0.6–4.8)
LYMPHOCYTES NFR BLD AUTO: 13.4 % (ref 24–44)
MCH RBC QN AUTO: 30.8 PG (ref 27–31)
MCHC RBC AUTO-ENTMCNC: 34.5 G/DL (ref 32–36)
MCV RBC AUTO: 89.2 FL (ref 80–99)
MONOCYTES # BLD AUTO: 0.5 10*3/MM3 (ref 0–1)
MONOCYTES NFR BLD AUTO: 12.6 % (ref 0–12)
NEUTROPHILS # BLD AUTO: 2.76 10*3/MM3 (ref 1.5–8.3)
NEUTROPHILS NFR BLD AUTO: 69.7 % (ref 41–71)
PLATELET # BLD AUTO: 132 10*3/MM3 (ref 150–450)
PMV BLD AUTO: 10.1 FL (ref 6–12)
RBC # BLD AUTO: 3.9 10*6/MM3 (ref 4.2–5.76)
WBC NRBC COR # BLD: 3.96 10*3/MM3 (ref 3.5–10.8)

## 2017-10-07 PROCEDURE — 85025 COMPLETE CBC W/AUTO DIFF WBC: CPT | Performed by: INTERNAL MEDICINE

## 2017-10-07 PROCEDURE — 25010000002 HEPARIN (PORCINE) PER 1000 UNITS: Performed by: COLON & RECTAL SURGERY

## 2017-10-07 PROCEDURE — 25010000002 HYDROMORPHONE PER 4 MG: Performed by: COLON & RECTAL SURGERY

## 2017-10-07 PROCEDURE — 25010000002 PIPERACILLIN SOD-TAZOBACTAM PER 1 G: Performed by: COLON & RECTAL SURGERY

## 2017-10-07 PROCEDURE — 25010000002 ONDANSETRON PER 1 MG: Performed by: COLON & RECTAL SURGERY

## 2017-10-07 PROCEDURE — 99232 SBSQ HOSP IP/OBS MODERATE 35: CPT | Performed by: INTERNAL MEDICINE

## 2017-10-07 RX ADMIN — POLYETHYLENE GLYCOL 3350 17 G: 17 POWDER, FOR SOLUTION ORAL at 08:12

## 2017-10-07 RX ADMIN — HYDROMORPHONE HYDROCHLORIDE 1 MG: 1 INJECTION, SOLUTION INTRAMUSCULAR; INTRAVENOUS; SUBCUTANEOUS at 17:22

## 2017-10-07 RX ADMIN — TAZOBACTAM SODIUM AND PIPERACILLIN SODIUM 3.38 G: 375; 3 INJECTION, SOLUTION INTRAVENOUS at 15:06

## 2017-10-07 RX ADMIN — ACETAMINOPHEN 1000 MG: 500 TABLET ORAL at 20:27

## 2017-10-07 RX ADMIN — OXYCODONE HYDROCHLORIDE 10 MG: 5 TABLET ORAL at 05:11

## 2017-10-07 RX ADMIN — OXYCODONE HYDROCHLORIDE 10 MG: 5 TABLET ORAL at 15:01

## 2017-10-07 RX ADMIN — SODIUM CHLORIDE 50 ML/HR: 9 INJECTION, SOLUTION INTRAVENOUS at 12:57

## 2017-10-07 RX ADMIN — HEPARIN SODIUM 5000 UNITS: 5000 INJECTION, SOLUTION INTRAVENOUS; SUBCUTANEOUS at 20:27

## 2017-10-07 RX ADMIN — OXYCODONE HYDROCHLORIDE 10 MG: 5 TABLET ORAL at 18:58

## 2017-10-07 RX ADMIN — HEPARIN SODIUM 5000 UNITS: 5000 INJECTION, SOLUTION INTRAVENOUS; SUBCUTANEOUS at 13:06

## 2017-10-07 RX ADMIN — ONDANSETRON 4 MG: 2 INJECTION INTRAMUSCULAR; INTRAVENOUS at 09:27

## 2017-10-07 RX ADMIN — ONDANSETRON 4 MG: 2 INJECTION INTRAMUSCULAR; INTRAVENOUS at 20:27

## 2017-10-07 RX ADMIN — HYDROMORPHONE HYDROCHLORIDE 1 MG: 1 INJECTION, SOLUTION INTRAMUSCULAR; INTRAVENOUS; SUBCUTANEOUS at 08:12

## 2017-10-07 RX ADMIN — HEPARIN SODIUM 5000 UNITS: 5000 INJECTION, SOLUTION INTRAVENOUS; SUBCUTANEOUS at 05:12

## 2017-10-07 RX ADMIN — HYDROMORPHONE HYDROCHLORIDE 1 MG: 1 INJECTION, SOLUTION INTRAMUSCULAR; INTRAVENOUS; SUBCUTANEOUS at 01:30

## 2017-10-07 RX ADMIN — TAZOBACTAM SODIUM AND PIPERACILLIN SODIUM 3.38 G: 375; 3 INJECTION, SOLUTION INTRAVENOUS at 08:12

## 2017-10-07 RX ADMIN — ACETAMINOPHEN 1000 MG: 500 TABLET ORAL at 15:05

## 2017-10-07 RX ADMIN — HYDROMORPHONE HYDROCHLORIDE 1 MG: 1 INJECTION, SOLUTION INTRAMUSCULAR; INTRAVENOUS; SUBCUTANEOUS at 13:01

## 2017-10-07 RX ADMIN — HYDROMORPHONE HYDROCHLORIDE 1 MG: 1 INJECTION, SOLUTION INTRAMUSCULAR; INTRAVENOUS; SUBCUTANEOUS at 20:27

## 2017-10-07 RX ADMIN — ACETAMINOPHEN 1000 MG: 500 TABLET ORAL at 08:12

## 2017-10-07 RX ADMIN — TAZOBACTAM SODIUM AND PIPERACILLIN SODIUM 3.38 G: 375; 3 INJECTION, SOLUTION INTRAVENOUS at 03:06

## 2017-10-07 RX ADMIN — OXYCODONE HYDROCHLORIDE 10 MG: 5 TABLET ORAL at 10:57

## 2017-10-07 RX ADMIN — TAZOBACTAM SODIUM AND PIPERACILLIN SODIUM 3.38 G: 375; 3 INJECTION, SOLUTION INTRAVENOUS at 21:10

## 2017-10-07 NOTE — PLAN OF CARE
Problem: Patient Care Overview (Adult)  Goal: Plan of Care Review  Outcome: Ongoing (interventions implemented as appropriate)  Goal: Discharge Needs Assessment  Outcome: Ongoing (interventions implemented as appropriate)  Goal: Plan of Care Review  Outcome: Ongoing (interventions implemented as appropriate)  Goal: Adult Individualization and Mutuality  Outcome: Ongoing (interventions implemented as appropriate)  Goal: Discharge Needs Assessment  Outcome: Ongoing (interventions implemented as appropriate)    Problem: Perioperative Period (Adult)  Goal: Signs and Symptoms of Listed Potential Problems Will be Absent or Manageable (Perioperative Period)  Outcome: Ongoing (interventions implemented as appropriate)

## 2017-10-07 NOTE — PROGRESS NOTES
[facility] Medicine Services  INPATIENT PROGRESS NOTE    Date of Admission: 9/29/2017  Length of Stay: 8  Primary Care Physician: Megan Rose,     Subjective   CC: f/u abdominal pain, post-op infection  HPI:  Doing okay today. Has not been able to tolerate a lot of oral intake secondary to nausea. States he is having BM's. Abdominal wound still with significant amount of purulent output, and pain.    Review Of Systems:   Review of Systems  Negative for fever,chills, chest pain, headache, shortness of breath, nausea, vomiting.    Otherwise 10 system ROS negative except as noted pertinent positives above in HPI.     Objective      Temp:  [97.9 °F (36.6 °C)-98.7 °F (37.1 °C)] 98.7 °F (37.1 °C)  Heart Rate:  [45-61] 57  Resp:  [14-17] 16  BP: (111-134)/(72-82) 128/82  Physical Exam  GEN:Patient is ill appearing  male, alert and oriented x3  HEENT: AT/NC  NECK: Neck is without mass or JVD  CV: RRR no m/r/g, S1,S2  LUNGS: CTAB no w/r/r  ABD: abdominal wound appears much better today, erythema significantly improved, still having some drainage from site. Some distention today  SKIN: no rashes, lesions  NEURO: no focal deficits  PSYCH: Mood is appropriate  EXT: no c/c/e/e    Results Review:    I have reviewed the labs, radiology results and diagnostic studies.      Results from last 7 days  Lab Units 10/07/17  0520   WBC 10*3/mm3 3.96   HEMOGLOBIN g/dL 12.0*   PLATELETS 10*3/mm3 132*       Results from last 7 days  Lab Units 10/06/17  0507   SODIUM mmol/L 137   POTASSIUM mmol/L 3.5   CHLORIDE mmol/L 101   CO2 mmol/L 28.0   BUN mg/dL <5*   CREATININE mg/dL 0.70   GLUCOSE mg/dL 70   CALCIUM mg/dL 8.4*       Culture Data: Cultures:    Wound Culture   Date Value Ref Range Status   10/04/2017 Culture in progress  Preliminary       Radiology Data:     I have reviewed the medications.    Assessment/Plan     Problem List  Hospital Problem List     * (Principal)Wound infection after surgery     Bradycardia    Small bowel obstruction        Assessment/Plan:  1. Post- Operative Abscess s/p ex lap:  - s/p ex lap, lysis of adhesions and removal of mesh with umbilical hernia repair 9/29  - CT abd/pelvis showed presence of abscess along midline abdominal wall at incision site - I&D 10/4 at bedside  - Cx growing pan sensitive Klebsiella and Enterobacter, will consult ID for abx recommendations and duration, continue IV Zosyn for now  - further management per surgery    2. Post-Op ileus/Parital SBO:  -NG tube removal yesterday, tolerating full liquids, still having some nausea, abdomen appears more distended today, monitor closely  -IV fluids, electrolyte replacment    3. Sinus Bradycardia:  - asymptomatic, avoid AVN blocking drugs    DVT prophylaxis: Heparin  Discharge Planning: I expect patient to be discharged TBLADY Pelaez DO   10/07/17   1:09 PM

## 2017-10-07 NOTE — PLAN OF CARE
Problem: Pain, Acute (Adult)  Goal: Acceptable Pain Control/Comfort Level  Outcome: Ongoing (interventions implemented as appropriate)    Problem: Patient Care Overview (Adult)  Goal: Plan of Care Review  Outcome: Ongoing (interventions implemented as appropriate)  Goal: Discharge Needs Assessment  Outcome: Ongoing (interventions implemented as appropriate)    Problem: Infection, Risk/Actual (Adult)  Goal: Identify Related Risk Factors and Signs and Symptoms  Outcome: Ongoing (interventions implemented as appropriate)  Goal: Infection Prevention/Resolution  Outcome: Ongoing (interventions implemented as appropriate)

## 2017-10-07 NOTE — PROGRESS NOTES
Wound improved.  Still having pain control issues.  Passing gas and having bowel movements.  Encouraged ambulation.  Will regroup on discharge tomorrow.

## 2017-10-08 ENCOUNTER — APPOINTMENT (OUTPATIENT)
Dept: CT IMAGING | Facility: HOSPITAL | Age: 52
End: 2017-10-08

## 2017-10-08 PROCEDURE — 74178 CT ABD&PLV WO CNTR FLWD CNTR: CPT

## 2017-10-08 PROCEDURE — 25010000002 HEPARIN (PORCINE) PER 1000 UNITS: Performed by: COLON & RECTAL SURGERY

## 2017-10-08 PROCEDURE — 25010000002 ONDANSETRON PER 1 MG: Performed by: COLON & RECTAL SURGERY

## 2017-10-08 PROCEDURE — 25010000002 HYDROMORPHONE PER 4 MG: Performed by: COLON & RECTAL SURGERY

## 2017-10-08 PROCEDURE — 99232 SBSQ HOSP IP/OBS MODERATE 35: CPT | Performed by: INTERNAL MEDICINE

## 2017-10-08 PROCEDURE — 25010000002 PIPERACILLIN SOD-TAZOBACTAM PER 1 G: Performed by: COLON & RECTAL SURGERY

## 2017-10-08 PROCEDURE — 0 IOPAMIDOL 61 % SOLUTION: Performed by: INTERNAL MEDICINE

## 2017-10-08 RX ADMIN — HEPARIN SODIUM 5000 UNITS: 5000 INJECTION, SOLUTION INTRAVENOUS; SUBCUTANEOUS at 05:14

## 2017-10-08 RX ADMIN — OXYCODONE HYDROCHLORIDE 10 MG: 5 TABLET ORAL at 13:25

## 2017-10-08 RX ADMIN — HYDROMORPHONE HYDROCHLORIDE 1 MG: 1 INJECTION, SOLUTION INTRAMUSCULAR; INTRAVENOUS; SUBCUTANEOUS at 08:31

## 2017-10-08 RX ADMIN — HYDROMORPHONE HYDROCHLORIDE 1 MG: 1 INJECTION, SOLUTION INTRAMUSCULAR; INTRAVENOUS; SUBCUTANEOUS at 21:34

## 2017-10-08 RX ADMIN — TAZOBACTAM SODIUM AND PIPERACILLIN SODIUM 3.38 G: 375; 3 INJECTION, SOLUTION INTRAVENOUS at 21:34

## 2017-10-08 RX ADMIN — IOPAMIDOL 95 ML: 612 INJECTION, SOLUTION INTRAVENOUS at 21:30

## 2017-10-08 RX ADMIN — TAZOBACTAM SODIUM AND PIPERACILLIN SODIUM 3.38 G: 375; 3 INJECTION, SOLUTION INTRAVENOUS at 08:34

## 2017-10-08 RX ADMIN — ACETAMINOPHEN 1000 MG: 500 TABLET ORAL at 08:35

## 2017-10-08 RX ADMIN — HYDROMORPHONE HYDROCHLORIDE 1 MG: 1 INJECTION, SOLUTION INTRAMUSCULAR; INTRAVENOUS; SUBCUTANEOUS at 10:59

## 2017-10-08 RX ADMIN — TAZOBACTAM SODIUM AND PIPERACILLIN SODIUM 3.38 G: 375; 3 INJECTION, SOLUTION INTRAVENOUS at 03:08

## 2017-10-08 RX ADMIN — TAZOBACTAM SODIUM AND PIPERACILLIN SODIUM 3.38 G: 375; 3 INJECTION, SOLUTION INTRAVENOUS at 15:31

## 2017-10-08 RX ADMIN — HYDROMORPHONE HYDROCHLORIDE 1 MG: 1 INJECTION, SOLUTION INTRAMUSCULAR; INTRAVENOUS; SUBCUTANEOUS at 15:30

## 2017-10-08 RX ADMIN — OXYCODONE HYDROCHLORIDE 10 MG: 5 TABLET ORAL at 17:54

## 2017-10-08 RX ADMIN — ACETAMINOPHEN 1000 MG: 500 TABLET ORAL at 15:30

## 2017-10-08 RX ADMIN — SODIUM CHLORIDE 50 ML/HR: 9 INJECTION, SOLUTION INTRAVENOUS at 08:34

## 2017-10-08 RX ADMIN — HEPARIN SODIUM 5000 UNITS: 5000 INJECTION, SOLUTION INTRAVENOUS; SUBCUTANEOUS at 21:34

## 2017-10-08 RX ADMIN — ACETAMINOPHEN 1000 MG: 500 TABLET ORAL at 21:34

## 2017-10-08 RX ADMIN — POLYETHYLENE GLYCOL 3350 17 G: 17 POWDER, FOR SOLUTION ORAL at 08:35

## 2017-10-08 RX ADMIN — ONDANSETRON 4 MG: 2 INJECTION INTRAMUSCULAR; INTRAVENOUS at 17:54

## 2017-10-08 RX ADMIN — ONDANSETRON 4 MG: 2 INJECTION INTRAMUSCULAR; INTRAVENOUS at 08:35

## 2017-10-08 RX ADMIN — ONDANSETRON 4 MG: 2 INJECTION INTRAMUSCULAR; INTRAVENOUS at 13:25

## 2017-10-08 NOTE — CONSULTS
Arthur Recinos  1965  [id number]    Date of Consult: 10/8/2017    9/29/2017      Requesting Provider: No ref. provider found  Evaluating Physician: Roddy Llanes MD    Chief Complaint: Abdominal pain    Reason for Consultation: Abdominal surgical site infection after hernia repair/small bowel obstruction    History of present illness:   Patient is a 52 y.o.  Yr old male with history of anal squamous cell carcinoma followed previously by Dr. Benjamin with prior radiation.  He has history of ventral hernia repair with mesh by another surgeon in 2014.  He has been followed by Dr. Benjamin since April 2017 with intermittent obstruction.  He is admitted on September 29 concerning for small bowel obstruction per radiology with surrounding fluid/inflammatory stranding.  He was taken for surgery on September 29 with exploratory laparotomy/lysis of adhesions/removal of mesh and umbilical hernia repair.  Postoperatively, he developed abdominal wall abscess with drainage by Dr. Benjamin on October 4, 2017.  Cultures with Klebsiella/Enterobacter.  Zosyn ongoing.    He continues to have anterior abdominal wall pain near the surgical site which she describes as constant, sharp, nonradiating, worse with palpation, better with pain meds but not completely relieved and 4-8 out of 10 in severity.  He denies any feculent drainage.  No gross hematuria or pyuria with respect to urinary tract and no dysuria/hesitancy or urgency.  No nausea vomiting or diarrhea.  He is passing gas and does have some bowel movement.  No hematochezia melena or hematemesis.  No shortness of breath or cough.  No rash.    No raw or undercooked food.  No unpasteurized milk or milk products.  No animal insect or arthropod exposure.  No tick bites.  No outdoor camping or hunting exposure.  No travel exposure.  No ill exposure.  No history of TB or TB exposure.   Denies a history of MRSA/VRE and no history of C. difficile or ESBL/KPC  organisms.    Past  Medical History:   Diagnosis Date   • Degenerative disc disease, lumbar    • Rectal cancer        Past Surgical History:   Procedure Laterality Date   • COLONOSCOPY  2016   • EXPLORATORY LAPAROTOMY N/A 9/29/2017    Procedure: LAPAROTOMY EXPLORATORY, LYSIS OF ADHESIONS, REMOVAL OF MESH PLACED DURING PREVIOUS PROCEDURE, UMBILICAL HERNIA REPAIR;  Surgeon: Saul Benjamin MD;  Location: [facility];  Service:    • HEMORROIDECTOMY  2014   • LUMBAR DISC SURGERY  2006    L2-L5   • PILONIDAL CYSTECTOMY  2015   • UMBILICAL HERNIA REPAIR  2014   • UPPER GASTROINTESTINAL ENDOSCOPY  2016       Pediatric History   Patient Guardian Status   • Not on file.     Other Topics Concern   • Not on file     Social History Narrative   He denies tobacco alcohol or illicit drugs    family history includes Stroke in his father; Throat cancer in his father.    Allergies   Allergen Reactions   • Codeine Itching       Medication:  Current Facility-Administered Medications   Medication Dose Route Frequency Provider Last Rate Last Dose   • acetaminophen (TYLENOL) tablet 1,000 mg  1,000 mg Oral TID Saul Benjamin MD   1,000 mg at 10/08/17 0835   • heparin (porcine) 5000 UNIT/ML injection 5,000 Units  5,000 Units Subcutaneous Q8H Saul Benjamin MD   5,000 Units at 10/08/17 0514   • HYDROmorphone (DILAUDID) injection 1 mg  1 mg Intravenous Q2H PRN Sean Quinones MD   1 mg at 10/08/17 0831   • LORazepam (ATIVAN) injection 1 mg  1 mg Intravenous Q6H PRN Sean Quinones MD   1 mg at 10/04/17 0652   • Magnesium Sulfate 2 gram Bolus, followed by 8 gram infusion (total Mg dose 10 grams)- Mg less than or equal to 1mg/dL  2 g Intravenous PRN Sean Ibarra ANSHU        Or   • Magnesium Sulfate 6 gram Infusion (2 gm x 3) -Mg 1.1 -1.5 mg/dL  2 g Intravenous PRN Sean Ibarra RPH        Or   • magnesium sulfate 4 gram infusion- Mg 1.6-1.9 mg/dL  4 g Intravenous PRN Sean Ibarra RPH 25 mL/hr at 10/01/17 1117 4 g at 10/01/17 1117   • ondansetron (ZOFRAN)  injection 4 mg  4 mg Intravenous Q6H PRN Saul Benjamin MD   4 mg at 10/08/17 0835   • oxyCODONE (ROXICODONE) immediate release tablet 10 mg  10 mg Oral Q4H PRN Halle Davis MD   10 mg at 10/07/17 1858   • oxyCODONE (ROXICODONE) immediate release tablet 5 mg  5 mg Oral Q4H PRN Halle Davis MD   5 mg at 10/06/17 1237   • piperacillin-tazobactam (ZOSYN) 3.375 g in iso-osmotic dextrose 50 ml (premix)  3.375 g Intravenous Q6H Saul Benjamin MD   3.375 g at 10/08/17 0834   • polyethylene glycol (MIRALAX) powder 17 g  17 g Oral Daily Saul Benjamin MD   17 g at 10/08/17 0835   • potassium chloride (MICRO-K) CR capsule 40 mEq  40 mEq Oral PRN Sean Ibarra, RPH   40 mEq at 10/05/17 1146    Or   • potassium chloride (KLOR-CON) packet 40 mEq  40 mEq Oral PRN Sean Ibarra, RPH        Or   • potassium chloride 10 mEq in 100 mL IVPB  10 mEq Intravenous Q1H PRN Sean Ibarra RPH       • sodium chloride 0.9 % infusion  50 mL/hr Intravenous Continuous Halle Davis MD 50 mL/hr at 10/08/17 0834 50 mL/hr at 10/08/17 0834       Antibiotics:  IV Anti-Infectives     Ordered     Dose/Rate Route Frequency Start Stop    10/03/17 1818  piperacillin-tazobactam (ZOSYN) 3.375 g in iso-osmotic dextrose 50 ml (premix)     Ordering Provider:  Saul Benjamin MD    3.375 g  over 0.5 Hours Intravenous Every 6 Hours 10/03/17 2000              Review of Systems    Constitutional-- No Fever, chills or sweats.  Appetite Diminished with generalized fatigue  Heent-- No new vision, hearing or throat complaints.  No epistaxis or oral sores.  Denies odynophagia or dysphagia.  No flashers, floaters or eye pain. No odynophagia or dysphagia. No headache, photophobia or neck stiffness.  CV-- No chest pain, palpitation or syncope  Resp-- No SOB/cough/Hemoptysis  GI- as above.  Denies jaundice or chronic liver disease.  -- No dysuria or flank pain.  Denies hesitancy, urgency or flank pain.  Lymph- no swollen lymph nodes in neck/axilla or  "groin.   Heme- No active bruising or bleeding; no Hx of DVT or PE.  MS-- no swelling or pain in the bones or joints of arms/legs.  No new back pain.  Neuro-- No acute focal weakness or numbness in the arms or legs.  No seizures.    Full 12 point review of systems reviewed and negative otherwise for acute complaints, except for above    Physical Exam:   Vital Signs   /86  Pulse 55  Temp 98.1 °F (36.7 °C) (Oral)   Resp 16  Ht 69\" (175.3 cm)  Wt 165 lb (74.8 kg)  SpO2 99%  BMI 24.37 kg/m2    GENERAL: Awake and alert, in no acute distress.   HEENT: Normocephalic, atraumatic.  PERRL. EOMI. No conjunctival injection. No icterus. Oropharynx clear without evidence of thrush or exudate. No evidence of peridontal disease.    NECK: Supple without nuchal rigidity. No mass.  LYMPH: No cervical, axillary or inguinal lymphadenopathy.  HEART: RRR; No murmur, rubs, gallops.   LUNGS: Clear to auscultation bilaterally without wheezing, rales, rhonchi. Normal respiratory effort. Nonlabored. No dullness.  ABDOMEN: Soft, tender at the center of his abdomen, nondistended. Positive bowel sounds. No rebound or guarding. NO mass or HSM.  EXT:  No cyanosis, clubbing or edema. No cord.  : Genitalia generally unremarkable.  Without Fuentes catheter.  MSK: FROM without joint effusions noted arms/legs.    SKIN: Warm and dry without cutaneous eruptions on Inspection/palpation.    NEURO: Oriented to PPT. No focal deficits on motor/sensory exam at arms/legs.  PSYCHIATRIC: Normal insight and judgement. Cooperative with PE    Abdominal surgical site with open wound and some serous sanguinous drainage.  Unable to visualize any prosthetic material and no discrete mass bulge or fluctuance.  No crepitus or bulla.  Vague tenderness.    Laboratory Data      Results from last 7 days  Lab Units 10/07/17  0520 10/06/17  0507 10/04/17  0659   WBC 10*3/mm3 3.96 4.34 5.85   HEMOGLOBIN g/dL 12.0* 11.7* 12.6*   HEMATOCRIT % 34.8* 33.8* 37.4* "   PLATELETS 10*3/mm3 132* 130* 139*       Results from last 7 days  Lab Units 10/06/17  0507   SODIUM mmol/L 137   POTASSIUM mmol/L 3.5   CHLORIDE mmol/L 101   CO2 mmol/L 28.0   BUN mg/dL <5*   CREATININE mg/dL 0.70   GLUCOSE mg/dL 70   CALCIUM mg/dL 8.4*                   Estimated Creatinine Clearance: 130.6 mL/min (by C-G formula based on Cr of 0.7).      Microbiology:      Radiology:  Imaging Results (last 72 hours)     ** No results found for the last 72 hours. **            Impression:   --Acute abdominal wall abscess/cellulitis at surgical site with Klebsiella/Enterobacter and culture at least to muscle but no obvious visible prosthetic material, no necrotic tissue and no feculent drainage at present.  I'm unable to confirm a enterocutaneous fistula but he is at risk and this will need to be monitored by colorectal surgery.  In addition, he still has pain but I am unable to identify a focal persistent collection.  If not steadily improving, surgery may need to give consideration to repeat imaging or repeat debridement.  No MDR gram-positive cocci or MDR gram-negative rods so far he knows antibiotics are not a guarantee for cure and still may require further imaging or debridement.    --Acute small bowel obstruction associated with prior hernia repair/mesh, mesh removed and repeat hernia repair on September 29 as outlined above    --History anal carcinoma with prior treatment    --Sinus bradycardia per notes    PLAN: Thank you for asking us to see Arthur Recinos, I recommend the following:    --IV Zosyn    --I discussed potential risks and benefits of the prescribed antibiotics that include, but are not limited to, solid organ toxicity,  renal toxicity, CDiff, cytopenias, hypersensitivity,  etc.. Patient voices understanding and agree to proceed.    --Monitor IV and IV antibiotic with risk for systemic complication and potential drug interaction    --Check/review labs cultures and scans    --Highly complex set  of issues with high risk for further serious morbidity and other serious sequela    --Discussed with microbiology, partial history per nursing staff.    --Colorectal surgery following to help guide timing/option or threshold for further debridement        Roddy Llanes MD  10/8/2017

## 2017-10-08 NOTE — PLAN OF CARE
Problem: Patient Care Overview (Adult)  Goal: Plan of Care Review  Outcome: Ongoing (interventions implemented as appropriate)  Goal: Discharge Needs Assessment  Outcome: Ongoing (interventions implemented as appropriate)  Goal: Plan of Care Review  Outcome: Ongoing (interventions implemented as appropriate)  Goal: Adult Individualization and Mutuality  Outcome: Ongoing (interventions implemented as appropriate)  Goal: Discharge Needs Assessment  Outcome: Ongoing (interventions implemented as appropriate)    Problem: Perioperative Period (Adult)  Goal: Signs and Symptoms of Listed Potential Problems Will be Absent or Manageable (Perioperative Period)  Outcome: Ongoing (interventions implemented as appropriate)    Problem: Infection, Risk/Actual (Adult)  Goal: Identify Related Risk Factors and Signs and Symptoms  Outcome: Ongoing (interventions implemented as appropriate)  Goal: Infection Prevention/Resolution  Outcome: Ongoing (interventions implemented as appropriate)

## 2017-10-08 NOTE — PROGRESS NOTES
Passing a small amount of gas and bowel movements.  Early satiety and distention.  Still having quite a bit of pain on the right side of his incision.  Wound looking good per patient and nursing staff.  Proceed with CT scan to further evaluate for any evidence of intra-abdominal pathology.

## 2017-10-08 NOTE — PROGRESS NOTES
[facility] Medicine Services  INPATIENT PROGRESS NOTE    Date of Admission: 9/29/2017  Length of Stay: 9  Primary Care Physician: Megan Rose,     Subjective   CC: f/u abdominal pain, post-op infection  HPI:  Doing okay today. Has not been able to tolerate a lot of oral intake secondary to nausea. States he is having BM's. Abdominal wound still with significant amount of purulent output, and pain.    Review Of Systems:   Review of Systems  Negative for fever,chills, chest pain, headache, shortness of breath, nausea, vomiting.    Otherwise 10 system ROS negative except as noted pertinent positives above in HPI.     Objective      Temp:  [98 °F (36.7 °C)-98.1 °F (36.7 °C)] 98.1 °F (36.7 °C)  Heart Rate:  [55] 55  Resp:  [16] 16  BP: (110-119)/(86-87) 110/86  Physical Exam  GEN:Patient is ill appearing  male, alert and oriented x3  HEENT: AT/NC  NECK: Neck is without mass or JVD  CV: RRR no m/r/g, S1,S2  LUNGS: CTAB no w/r/r  ABD: abdominal wound appears much better today, erythema significantly improved, still having some drainage from site. Some distention today  SKIN: no rashes, lesions  NEURO: no focal deficits  PSYCH: Mood is appropriate  EXT: no c/c/e/e    Results Review:    I have reviewed the labs, radiology results and diagnostic studies.      Results from last 7 days  Lab Units 10/07/17  0520   WBC 10*3/mm3 3.96   HEMOGLOBIN g/dL 12.0*   PLATELETS 10*3/mm3 132*       Results from last 7 days  Lab Units 10/06/17  0507   SODIUM mmol/L 137   POTASSIUM mmol/L 3.5   CHLORIDE mmol/L 101   CO2 mmol/L 28.0   BUN mg/dL <5*   CREATININE mg/dL 0.70   GLUCOSE mg/dL 70   CALCIUM mg/dL 8.4*       Culture Data: Cultures:    Wound Culture   Date Value Ref Range Status   10/04/2017 Culture in progress  Preliminary       Radiology Data:     I have reviewed the medications.    Assessment/Plan     Problem List  Hospital Problem List     * (Principal)Wound infection after surgery     Bradycardia    Small bowel obstruction        Assessment/Plan:  1. Post- Operative Abscess s/p ex lap:  - s/p ex lap, lysis of adhesions and removal of mesh with umbilical hernia repair 9/29  - CT abd/pelvis showed presence of abscess along midline abdominal wall at incision site - I&D 10/4 at bedside  - Cx growing pan sensitive Klebsiella and Enterobacter, Appreciate ID assistance, will continue Zosyn for now  - patient still having quite a bit of pain over right side of wound, repeat CT today per surgery    2. Post-Op ileus/Parital SBO:  -NG tube removal yesterday, tolerating full liquids, still unable to tolerate full diet - will advance as tolerated  -IV fluids, electrolyte replacment    3. Sinus Bradycardia:  - asymptomatic, avoid AVN blocking drugs    DVT prophylaxis: Heparin  Discharge Planning: I expect patient to be discharged COLBY Pelaez DO   10/08/17   2:54 PM

## 2017-10-09 PROBLEM — K56.609 SMALL BOWEL OBSTRUCTION (HCC): Status: RESOLVED | Noted: 2017-09-29 | Resolved: 2017-10-09

## 2017-10-09 PROBLEM — R00.1 BRADYCARDIA: Status: RESOLVED | Noted: 2017-08-28 | Resolved: 2017-10-09

## 2017-10-09 PROBLEM — T81.49XA WOUND INFECTION AFTER SURGERY: Status: RESOLVED | Noted: 2017-10-05 | Resolved: 2017-10-09

## 2017-10-09 LAB
BASOPHILS # BLD AUTO: 0.01 10*3/MM3 (ref 0–0.2)
BASOPHILS NFR BLD AUTO: 0.3 % (ref 0–1)
C DIFF TOX GENS STL QL NAA+PROBE: DETECTED
DEPRECATED RDW RBC AUTO: 45.7 FL (ref 37–54)
EOSINOPHIL # BLD AUTO: 0.11 10*3/MM3 (ref 0–0.3)
EOSINOPHIL NFR BLD AUTO: 3.3 % (ref 0–3)
ERYTHROCYTE [DISTWIDTH] IN BLOOD BY AUTOMATED COUNT: 14 % (ref 11.3–14.5)
HCT VFR BLD AUTO: 32.2 % (ref 38.9–50.9)
HGB BLD-MCNC: 11 G/DL (ref 13.1–17.5)
IMM GRANULOCYTES # BLD: 0.01 10*3/MM3 (ref 0–0.03)
IMM GRANULOCYTES NFR BLD: 0.3 % (ref 0–0.6)
LYMPHOCYTES # BLD AUTO: 0.79 10*3/MM3 (ref 0.6–4.8)
LYMPHOCYTES NFR BLD AUTO: 23.7 % (ref 24–44)
MCH RBC QN AUTO: 30.7 PG (ref 27–31)
MCHC RBC AUTO-ENTMCNC: 34.2 G/DL (ref 32–36)
MCV RBC AUTO: 89.9 FL (ref 80–99)
MONOCYTES # BLD AUTO: 0.43 10*3/MM3 (ref 0–1)
MONOCYTES NFR BLD AUTO: 12.9 % (ref 0–12)
NEUTROPHILS # BLD AUTO: 1.98 10*3/MM3 (ref 1.5–8.3)
NEUTROPHILS NFR BLD AUTO: 59.5 % (ref 41–71)
PLATELET # BLD AUTO: 145 10*3/MM3 (ref 150–450)
PMV BLD AUTO: 10.1 FL (ref 6–12)
RBC # BLD AUTO: 3.58 10*6/MM3 (ref 4.2–5.76)
WBC NRBC COR # BLD: 3.33 10*3/MM3 (ref 3.5–10.8)

## 2017-10-09 PROCEDURE — 25010000002 ONDANSETRON PER 1 MG: Performed by: COLON & RECTAL SURGERY

## 2017-10-09 PROCEDURE — 87493 C DIFF AMPLIFIED PROBE: CPT | Performed by: INTERNAL MEDICINE

## 2017-10-09 PROCEDURE — 25010000002 HEPARIN (PORCINE) PER 1000 UNITS: Performed by: COLON & RECTAL SURGERY

## 2017-10-09 PROCEDURE — 25010000002 PIPERACILLIN SOD-TAZOBACTAM PER 1 G: Performed by: COLON & RECTAL SURGERY

## 2017-10-09 PROCEDURE — 99232 SBSQ HOSP IP/OBS MODERATE 35: CPT | Performed by: INTERNAL MEDICINE

## 2017-10-09 PROCEDURE — 85025 COMPLETE CBC W/AUTO DIFF WBC: CPT | Performed by: INTERNAL MEDICINE

## 2017-10-09 PROCEDURE — 25010000002 LORAZEPAM PER 2 MG: Performed by: COLON & RECTAL SURGERY

## 2017-10-09 PROCEDURE — 25010000002 HYDROMORPHONE PER 4 MG: Performed by: COLON & RECTAL SURGERY

## 2017-10-09 RX ORDER — HYDROCODONE BITARTRATE AND ACETAMINOPHEN 7.5; 325 MG/1; MG/1
1 TABLET ORAL EVERY 6 HOURS PRN
Qty: 30 TABLET | Refills: 0 | Status: ON HOLD | OUTPATIENT
Start: 2017-10-09 | End: 2021-08-24

## 2017-10-09 RX ORDER — AMOXICILLIN AND CLAVULANATE POTASSIUM 875; 125 MG/1; MG/1
1 TABLET, FILM COATED ORAL 2 TIMES DAILY
Qty: 14 TABLET | Refills: 0 | Status: SHIPPED | OUTPATIENT
Start: 2017-10-09 | End: 2017-10-19

## 2017-10-09 RX ORDER — VANCOMYCIN HYDROCHLORIDE 125 MG/1
125 CAPSULE ORAL 4 TIMES DAILY
Qty: 40 CAPSULE | Refills: 0 | Status: SHIPPED | OUTPATIENT
Start: 2017-10-09 | End: 2017-10-10 | Stop reason: HOSPADM

## 2017-10-09 RX ORDER — SACCHAROMYCES BOULARDII 250 MG
250 CAPSULE ORAL 2 TIMES DAILY
Qty: 60 CAPSULE | Refills: 0 | Status: SHIPPED | OUTPATIENT
Start: 2017-10-09 | End: 2017-11-08

## 2017-10-09 RX ADMIN — LORAZEPAM 1 MG: 2 INJECTION INTRAMUSCULAR; INTRAVENOUS at 02:55

## 2017-10-09 RX ADMIN — SODIUM CHLORIDE 50 ML/HR: 9 INJECTION, SOLUTION INTRAVENOUS at 02:58

## 2017-10-09 RX ADMIN — HYDROMORPHONE HYDROCHLORIDE 1 MG: 1 INJECTION, SOLUTION INTRAMUSCULAR; INTRAVENOUS; SUBCUTANEOUS at 12:37

## 2017-10-09 RX ADMIN — ACETAMINOPHEN 1000 MG: 500 TABLET ORAL at 08:51

## 2017-10-09 RX ADMIN — HEPARIN SODIUM 5000 UNITS: 5000 INJECTION, SOLUTION INTRAVENOUS; SUBCUTANEOUS at 22:11

## 2017-10-09 RX ADMIN — ACETAMINOPHEN 1000 MG: 500 TABLET ORAL at 17:07

## 2017-10-09 RX ADMIN — TAZOBACTAM SODIUM AND PIPERACILLIN SODIUM 3.38 G: 375; 3 INJECTION, SOLUTION INTRAVENOUS at 08:51

## 2017-10-09 RX ADMIN — HYDROMORPHONE HYDROCHLORIDE 1 MG: 1 INJECTION, SOLUTION INTRAMUSCULAR; INTRAVENOUS; SUBCUTANEOUS at 02:58

## 2017-10-09 RX ADMIN — Medication 125 MG: at 18:33

## 2017-10-09 RX ADMIN — TAZOBACTAM SODIUM AND PIPERACILLIN SODIUM 3.38 G: 375; 3 INJECTION, SOLUTION INTRAVENOUS at 17:10

## 2017-10-09 RX ADMIN — TAZOBACTAM SODIUM AND PIPERACILLIN SODIUM 3.38 G: 375; 3 INJECTION, SOLUTION INTRAVENOUS at 02:58

## 2017-10-09 RX ADMIN — HEPARIN SODIUM 5000 UNITS: 5000 INJECTION, SOLUTION INTRAVENOUS; SUBCUTANEOUS at 14:53

## 2017-10-09 RX ADMIN — Medication 125 MG: at 14:52

## 2017-10-09 RX ADMIN — ONDANSETRON 4 MG: 2 INJECTION INTRAMUSCULAR; INTRAVENOUS at 02:58

## 2017-10-09 RX ADMIN — OXYCODONE HYDROCHLORIDE 10 MG: 5 TABLET ORAL at 05:24

## 2017-10-09 RX ADMIN — SODIUM CHLORIDE 50 ML/HR: 9 INJECTION, SOLUTION INTRAVENOUS at 22:10

## 2017-10-09 RX ADMIN — ACETAMINOPHEN 1000 MG: 500 TABLET ORAL at 20:21

## 2017-10-09 RX ADMIN — HEPARIN SODIUM 5000 UNITS: 5000 INJECTION, SOLUTION INTRAVENOUS; SUBCUTANEOUS at 05:24

## 2017-10-09 RX ADMIN — HYDROMORPHONE HYDROCHLORIDE 1 MG: 1 INJECTION, SOLUTION INTRAMUSCULAR; INTRAVENOUS; SUBCUTANEOUS at 20:29

## 2017-10-09 RX ADMIN — HYDROMORPHONE HYDROCHLORIDE 1 MG: 1 INJECTION, SOLUTION INTRAMUSCULAR; INTRAVENOUS; SUBCUTANEOUS at 17:18

## 2017-10-09 RX ADMIN — OXYCODONE HYDROCHLORIDE 10 MG: 5 TABLET ORAL at 23:25

## 2017-10-09 RX ADMIN — TAZOBACTAM SODIUM AND PIPERACILLIN SODIUM 3.38 G: 375; 3 INJECTION, SOLUTION INTRAVENOUS at 22:11

## 2017-10-09 RX ADMIN — ONDANSETRON 4 MG: 2 INJECTION INTRAMUSCULAR; INTRAVENOUS at 18:32

## 2017-10-09 NOTE — DISCHARGE SUMMARY
Colon and Rectal [[facility]]    Date of Discharge:  10/9/2017    Discharge Diagnosis: Small bowel obstruction caused by hernia mesh  Postoperative wound infection    Problem List:  Active Problems:    * No active hospital problems. *      Presenting Problem/History of Present Illness  Small bowel obstruction [K56.609]      Hospital Course  Patient is a 52 y.o. male presented with recurrent partial small bowel obstruction.  Several x-rays showed only partial obstruction until this admission when there was a full obstruction.  He is operated on the day of admission and the jejunum was wrapped in an umbilical hernia mesh.  I removed the mesh and fixed a serosal tear.  There were no enterotomies made.  He had a bezoar proximal to the obstruction and this caused an impressive delay in return of bowel function.  A wound infection was drained at the bedside which grew out Klebsiella and Enterobacter.  Zosyn cover this nicely and infectious disease recommended Augmentin twice a day for 7 days at discharge.    His ileus continued for several days after drainage of the abscess but a repeat CT scan yesterday showed contrast into his colon and on my return today, he has had copious loose stools and his abdomen is much softer.  His wound is clean without any cellulitis therefore he is ready for discharge.  He is to shower at least once a day and wash his wound out.  No further packing is required.  Follow-up with me later in the week.      Procedures Performed  Procedure(s):  LAPAROTOMY EXPLORATORY, LYSIS OF ADHESIONS, REMOVAL OF MESH PLACED DURING PREVIOUS PROCEDURE, UMBILICAL HERNIA REPAIR       Consults:   Consults     Date and Time Order Name Status Description    10/7/2017 1310 Inpatient Consult to Infectious Diseases Completed     9/29/2017 0836 Surgery (on-call MD unless specified) Completed             Condition on Discharge:      Vital Signs  Blood pressure 100/73, pulse 51, temperature 98.1 °F (36.7 °C), temperature source  "Oral, resp. rate 16, height 69\" (175.3 cm), weight 165 lb (74.8 kg), SpO2 97 %.    Discharge Disposition  Home or Self Care    Discharge Medications   Arthur Recinos   Home Medication Instructions MERRICK:[id number]    Printed on:10/09/17 1146   Medication Information                      amoxicillin-clavulanate (AUGMENTIN) 875-125 MG per tablet  Take 1 tablet by mouth 2 (Two) Times a Day for 10 days.             HYDROcodone-acetaminophen (NORCO) 7.5-325 MG per tablet  Take 1 tablet by mouth Every 6 (Six) Hours As Needed for Moderate Pain .             ondansetron (ZOFRAN) 4 MG tablet  Take 4 mg by mouth Every 6 (Six) Hours As Needed for Nausea or Vomiting.             saccharomyces boulardii (FLORASTOR) 250 MG capsule  Take 1 capsule by mouth 2 (Two) Times a Day for 30 days.             vancomycin (VANCOCIN) 125 MG capsule  Take 1 capsule by mouth 4 (Four) Times a Day for 10 days.                 Discharge Diet As tolerated      Activity at Discharge as tolerated      Follow-up Appointments this week  Future Appointments  Date Time Provider Department Center   8/21/2018 3:00 PM Elizabeth Schulz MD [facility]         Test Results Pending at DischargeNone      "

## 2017-10-09 NOTE — PROGRESS NOTES
Continued Stay Note  [facility]     Patient Name: Arthur Recinos  MRN: [id number]  Today's Date: 10/9/2017    Admit Date: 9/29/2017          Discharge Plan       10/09/17 1350    Case Management/Social Work Plan    Plan oral vanc    Patient/Family In Agreement With Plan yes    Additional Comments Pt will need oral vancomycin for home when medically ready for discharge.  Oral vancomycin arranged with [facility]. Spoke with Isa at [facility] and pt will have a $10 copay and they will deliver oral vanc to pt room tomorrow before discharge. Spoke with pt and  he is agreeable to copay. CM will cont to follow              Discharge Codes     None        Expected Discharge Date and Time     Expected Discharge Date Expected Discharge Time    Oct 9, 2017             Henrietta Cheng RN

## 2017-10-09 NOTE — PAYOR COMM NOTE
"Arthur Recinos (52 y.o. Male)     Date of Birth Social Security Number Address Home Phone MRN    1965  [id number]  [address]  [facility] [address] [phone] [id number]    Rastafarian Marital Status          None Single       Admission Date Admission Type Admitting Provider Attending Provider Department, Room/Bed    9/29/17 Emergency Sakina Pelaez, Sakina Seals, DO [facility] 5G, S566/1    Discharge Date Discharge Disposition Discharge Destination         Home or Self Care             Attending Provider: Sakina Pelaez DO     Allergies:  Codeine    Isolation:  Spore   Infection:  C.difficile (10/09/17)   Code Status:  FULL    Ht:  69\" (175.3 cm)   Wt:  165 lb (74.8 kg)    Admission Cmt:  None   Principal Problem:  Wound infection after surgery [T81.4XXA]                 Active Insurance as of 9/29/2017     Primary Coverage     Payor Plan Insurance Group Employer/Plan Group    [employer] [facility] [employer] O [id number]     Payor Plan Address Payor Plan Phone Number Effective From Effective To    [address] [phone] 1/1/2017     [place] [address]       Subscriber Name Subscriber Birth Date Member ID       ARTHUR RECINOS 1965 [id number]                 Emergency Contacts      (Rel.) Home Phone Work Phone Mobile Phone    Paresh Driver (Other) -- -- [phone]            Hospital Medications (active)       Dose Frequency Start End    acetaminophen (TYLENOL) tablet 1,000 mg 1,000 mg 3 Times Daily 9/29/2017     Sig - Route: Take 2 tablets by mouth 3 (Three) Times a Day. - Oral    heparin (porcine) 5000 UNIT/ML injection 5,000 Units 5,000 Units Every 8 Hours Scheduled 9/30/2017     Sig - Route: Inject 1 mL under the skin Every 8 (Eight) Hours. - Subcutaneous    HYDROmorphone (DILAUDID) injection 1 mg 1 mg Every 2 Hours PRN 9/30/2017 10/10/2017    Sig - Route: Infuse 1 mL into a venous catheter Every 2 (Two) Hours As Needed for " "Severe Pain . - Intravenous    iopamidol (ISOVUE-300) 61 % injection 100 mL 100 mL Once in Imaging 10/8/2017 10/8/2017    Sig - Route: Infuse 100 mL into a venous catheter Once. - Intravenous    LORazepam (ATIVAN) injection 1 mg 1 mg Every 6 Hours PRN 9/30/2017 10/10/2017    Sig - Route: Infuse 0.5 mL into a venous catheter Every 6 (Six) Hours As Needed for Anxiety. - Intravenous    Magnesium Sulfate 2 gram Bolus, followed by 8 gram infusion (total Mg dose 10 grams)- Mg less than or equal to 1mg/dL 2 g As Needed 10/1/2017     Sig - Route: Infuse 50 mL into a venous catheter As Needed (Mg less than or equal to 1mg/dL). - Intravenous    Linked Group 1:  \"Or\" Linked Group Details        magnesium sulfate 4 gram infusion- Mg 1.6-1.9 mg/dL 4 g As Needed 10/1/2017     Sig - Route: Infuse 100 mL into a venous catheter As Needed (Mg 1.6-1.9 mg/dL). - Intravenous    Linked Group 1:  \"Or\" Linked Group Details        Magnesium Sulfate 6 gram Infusion (2 gm x 3) -Mg 1.1 -1.5 mg/dL 2 g As Needed 10/1/2017     Sig - Route: Infuse 50 mL into a venous catheter As Needed (Mg 1.1 -1.5 mg/dL). - Intravenous    Linked Group 1:  \"Or\" Linked Group Details        ondansetron (ZOFRAN) injection 4 mg 4 mg Every 6 Hours PRN 10/3/2017     Sig - Route: Infuse 2 mL into a venous catheter Every 6 (Six) Hours As Needed for Nausea or Vomiting. - Intravenous    oxyCODONE (ROXICODONE) immediate release tablet 10 mg 10 mg Every 4 Hours PRN 10/6/2017 10/16/2017    Sig - Route: Take 2 tablets by mouth Every 4 (Four) Hours As Needed for Severe Pain . - Oral    oxyCODONE (ROXICODONE) immediate release tablet 5 mg 5 mg Every 4 Hours PRN 10/6/2017 10/16/2017    Sig - Route: Take 1 tablet by mouth Every 4 (Four) Hours As Needed for Moderate Pain . - Oral    piperacillin-tazobactam (ZOSYN) 3.375 g in iso-osmotic dextrose 50 ml (premix) 3.375 g Every 6 Hours 10/3/2017     Sig - Route: Infuse 50 mL into a venous catheter Every 6 (Six) Hours. - Intravenous    " "polyethylene glycol (MIRALAX) powder 17 g 17 g Daily 10/5/2017     Sig - Route: Take 17 g by mouth Daily. - Oral    potassium chloride (KLOR-CON) packet 40 mEq 40 mEq As Needed 10/1/2017     Sig - Route: Take 40 mEq by mouth As Needed (potassium replacement, see admin instructions). - Oral    Linked Group 2:  \"Or\" Linked Group Details        potassium chloride (MICRO-K) CR capsule 40 mEq 40 mEq As Needed 10/1/2017     Sig - Route: Take 4 capsules by mouth As Needed (potassium replacement.  see admin instructions). - Oral    Linked Group 2:  \"Or\" Linked Group Details        potassium chloride 10 mEq in 100 mL IVPB 10 mEq Every 1 Hour PRN 10/1/2017     Sig - Route: Infuse 100 mL into a venous catheter Every 1 (One) Hour As Needed (potassium protocol PERIPHERAL - see admin instructions). - Intravenous    Linked Group 2:  \"Or\" Linked Group Details        sodium chloride 0.9 % infusion 50 mL/hr Continuous 9/30/2017     Sig - Route: Infuse 50 mL/hr into a venous catheter Continuous. - Intravenous    vancomycin oral solution 125 mg 125 mg Every 6 Hours Scheduled 10/9/2017     Sig - Route: Take 2.5 mL by mouth Every 6 (Six) Hours. - Oral          Lab Results (last 24 hours)     Procedure Component Value Units Date/Time    CBC & Differential [[id number]] Collected:  10/09/17 0404    Specimen:  Blood Updated:  10/09/17 0523    Narrative:       The following orders were created for panel order CBC & Differential.  Procedure                               Abnormality         Status                     ---------                               -----------         ------                     CBC Auto Differential[[id number]]        Abnormal            Final result                 Please view results for these tests on the individual orders.    CBC Auto Differential [[id number]]  (Abnormal) Collected:  10/09/17 0404    Specimen:  Blood Updated:  10/09/17 0523     WBC 3.33 (L) 10*3/mm3      RBC 3.58 (L) 10*6/mm3      Hemoglobin 11.0 (L) " g/dL      Hematocrit 32.2 (L) %      MCV 89.9 fL      MCH 30.7 pg      MCHC 34.2 g/dL      RDW 14.0 %      RDW-SD 45.7 fl      MPV 10.1 fL      Platelets 145 (L) 10*3/mm3      Neutrophil % 59.5 %      Lymphocyte % 23.7 (L) %      Monocyte % 12.9 (H) %      Eosinophil % 3.3 (H) %      Basophil % 0.3 %      Immature Grans % 0.3 %      Neutrophils, Absolute 1.98 10*3/mm3      Lymphocytes, Absolute 0.79 10*3/mm3      Monocytes, Absolute 0.43 10*3/mm3      Eosinophils, Absolute 0.11 10*3/mm3      Basophils, Absolute 0.01 10*3/mm3      Immature Grans, Absolute 0.01 10*3/mm3     Clostridium Difficile Toxin - Stool, Per Rectum [[id number]] Collected:  10/09/17 0952    Specimen:  Stool from Per Rectum Updated:  10/09/17 1122    Narrative:       The following orders were created for panel order Clostridium Difficile Toxin - Stool, Per Rectum.  Procedure                               Abnormality         Status                     ---------                               -----------         ------                     Clostridium Difficile To...[[id number]]  Abnormal            Final result                 Please view results for these tests on the individual orders.    Clostridium Difficile Toxin, PCR - Stool, Per Rectum [[id number]]  (Abnormal) Collected:  10/09/17 0952    Specimen:  Stool from Per Rectum Updated:  10/09/17 1122     C. Difficile Toxins by PCR Detected (A)    Narrative:         Performance characteristics of test not established for patients <2 years of age.           Physician Progress Notes (last 24 hours) (Notes from 10/8/2017  2:16 PM through 10/9/2017  2:16 PM)      Sakina Pelaez DO at 10/8/2017  2:54 PM  Version 1 of 1             [facility] Medicine Services  INPATIENT PROGRESS NOTE    Date of Admission: 9/29/2017  Length of Stay: 9  Primary Care Physician: Megan Rose DO    Subjective   CC: f/u abdominal pain, post-op infection  HPI:  Doing okay today. Has not been able to  tolerate a lot of oral intake secondary to nausea. States he is having BM's. Abdominal wound still with significant amount of purulent output, and pain.    Review Of Systems:   Review of Systems  Negative for fever,chills, chest pain, headache, shortness of breath, nausea, vomiting.    Otherwise 10 system ROS negative except as noted pertinent positives above in HPI.     Objective      Temp:  [98 °F (36.7 °C)-98.1 °F (36.7 °C)] 98.1 °F (36.7 °C)  Heart Rate:  [55] 55  Resp:  [16] 16  BP: (110-119)/(86-87) 110/86  Physical Exam  GEN:Patient is ill appearing  male, alert and oriented x3  HEENT: AT/NC  NECK: Neck is without mass or JVD  CV: RRR no m/r/g, S1,S2  LUNGS: CTAB no w/r/r  ABD: abdominal wound appears much better today, erythema significantly improved, still having some drainage from site. Some distention today  SKIN: no rashes, lesions  NEURO: no focal deficits  PSYCH: Mood is appropriate  EXT: no c/c/e/e    Results Review:    I have reviewed the labs, radiology results and diagnostic studies.      Results from last 7 days  Lab Units 10/07/17  0520   WBC 10*3/mm3 3.96   HEMOGLOBIN g/dL 12.0*   PLATELETS 10*3/mm3 132*       Results from last 7 days  Lab Units 10/06/17  0507   SODIUM mmol/L 137   POTASSIUM mmol/L 3.5   CHLORIDE mmol/L 101   CO2 mmol/L 28.0   BUN mg/dL <5*   CREATININE mg/dL 0.70   GLUCOSE mg/dL 70   CALCIUM mg/dL 8.4*       Culture Data: Cultures:    Wound Culture   Date Value Ref Range Status   10/04/2017 Culture in progress  Preliminary       Radiology Data:     I have reviewed the medications.    Assessment/Plan     Problem List  Hospital Problem List     * (Principal)Wound infection after surgery    Bradycardia    Small bowel obstruction        Assessment/Plan:  1. Post- Operative Abscess s/p ex lap:  - s/p ex lap, lysis of adhesions and removal of mesh with umbilical hernia repair 9/29  - CT abd/pelvis showed presence of abscess along midline abdominal wall at incision site - I&D  10/4 at bedside  - Cx growing pan sensitive Klebsiella and Enterobacter, Appreciate ID assistance, will continue Zosyn for now  - patient still having quite a bit of pain over right side of wound, repeat CT today per surgery    2. Post-Op ileus/Parital SBO:  -NG tube removal yesterday, tolerating full liquids, still unable to tolerate full diet - will advance as tolerated  -IV fluids, electrolyte replacment    3. Sinus Bradycardia:  - asymptomatic, avoid AVN blocking drugs    DVT prophylaxis: Heparin  Discharge Planning: I expect patient to be discharged TBD  Sakina Pelaez DO   10/08/17   2:54 PM         Electronically signed by Sakina Pelaez DO at 10/8/2017  2:55 PM      Roddy Llanes MD at 10/9/2017 10:16 AM  Version 1 of 1         [facility] Progress Note    9/29/2017      Antibiotics:  IV Anti-Infectives     Ordered     Dose/Rate Route Frequency Start Stop    10/09/17 0910  amoxicillin-clavulanate (AUGMENTIN) 875-125 MG per tablet     Ordering Provider:  Saul Benjamin MD    1 tablet Oral 2 Times Daily 10/09/17 0000 10/19/17 2359    10/03/17 1818  piperacillin-tazobactam (ZOSYN) 3.375 g in iso-osmotic dextrose 50 ml (premix)     Ordering Provider:  Saul Benjamin MD    3.375 g  over 0.5 Hours Intravenous Every 6 Hours 10/03/17 2000            CC:Bowel obstruction, abdominal wall infection    HPI:  Patient is a 52 y.o.  Yr old male with history of anal squamous cell carcinoma followed previously by Dr. Benjamin with prior radiation.  He has history of ventral hernia repair with mesh by another surgeon in 2014.  He has been followed by Dr. Benjamin since April 2017 with intermittent obstruction.  He is admitted on September 29 concerning for small bowel obstruction per radiology with surrounding fluid/inflammatory stranding.  He was taken for surgery on September 29 with exploratory laparotomy/lysis of adhesions/removal of mesh and umbilical hernia repair.  Postoperatively, he developed abdominal wall  "abscess with drainage by Dr. Benjamin on October 4, 2017.  Cultures with Klebsiella/Enterobacter.  Zosyn ongoing.     10/9/17 Anterior abdominal wall pain less near the surgical site which she describes as constant, sharp, nonradiating, worse with palpation, better with pain meds.  He denies any feculent drainage.  No gross hematuria or pyuria with respect to urinary tract and no dysuria/hesitancy or urgency.  No nausea vomiting.  He is passing gas and does have some bowel movement.  No hematochezia melena or hematemesis.  No shortness of breath or cough.  No rash. Diarrhea after laxatives     ROS:  No f/c/s. No n/v. No rash. No new ADR to Abx.     PE:   /73 (BP Location: Right arm, Patient Position: Lying)  Pulse 51  Temp 98.1 °F (36.7 °C) (Oral)   Resp 16  Ht 69\" (175.3 cm)  Wt 165 lb (74.8 kg)  SpO2 97%  BMI 24.37 kg/m2    GENERAL: Awake and alert, in no acute distress.   HEENT:  No conjunctival injection. No icterus. Oropharynx clear without evidence of thrush or exudate.     HEART: RRR; No murmur, rubs, gallops.   LUNGS: Clear to auscultation bilaterally without wheezing, rales, rhonchi. Normal respiratory effort. Nonlabored. No dullness.  ABDOMEN: Soft, minimally tender, nondistended. Positive bowel sounds. No rebound or guarding. NO mass or HSM.  EXT:  No cyanosis, clubbing or edema. No cord.  : Genitalia generally unremarkable.  Without Fuentes catheter.  SKIN: Warm and dry without cutaneous eruptions on Inspection/palpation.     Abdominal surgical site with open wound and some serosanguinous drainage.  Unable to visualize any prosthetic material and no discrete mass bulge or fluctuance.  No crepitus or bulla.  Vague tenderness.     Laboratory Data      Results from last 7 days  Lab Units 10/09/17  0404 10/07/17  0520 10/06/17  0507   WBC 10*3/mm3 3.33* 3.96 4.34   HEMOGLOBIN g/dL 11.0* 12.0* 11.7*   HEMATOCRIT % 32.2* 34.8* 33.8*   PLATELETS 10*3/mm3 145* 132* 130*       Results from last 7 " days  Lab Units 10/06/17  0507   SODIUM mmol/L 137   POTASSIUM mmol/L 3.5   CHLORIDE mmol/L 101   CO2 mmol/L 28.0   BUN mg/dL <5*   CREATININE mg/dL 0.70   GLUCOSE mg/dL 70   CALCIUM mg/dL 8.4*                   Estimated Creatinine Clearance: 130.6 mL/min (by C-G formula based on Cr of 0.7).      Microbiology:      Radiology:  Imaging Results (last 72 hours)     Procedure Component Value Units Date/Time    CT Abdomen Pelvis With & Without Contrast [[id number]] Collected:  10/09/17 0739     Updated:  10/09/17 0804    Narrative:       EXAMINATION: CT ABDOMEN AND PELVIS W WO CONTRAST-      INDICATION: Postop from bowel obstruction operation.  Wound infection.   Please evaluate for intraabdominal infection.; K56.609-Unspecified  intestinal obstruction, unspecified as to partial versus complete  obstruction; R10.84-Generalized abdominal pain; R31.9-Hematuria,  unspecified.      TECHNIQUE: CT abdomen and pelvis with and without intravenous contrast  administration.     The radiation dose reduction device was turned on for each scan per the  ALARA (As Low as Reasonably Achievable) protocol.     COMPARISON: CT dated 10/03/2017.     FINDINGS: Lung bases demonstrate subsegmental atelectasis and/or  scarring greatest within the right lower lobe far decreased from prior  comparison with resolution of previously noted effusions. Liver is  without focal lesion. Gallbladder unremarkable. Spleen is normal.  Pancreas without suspicious lesion. Adrenals without distinct nodule.  Kidneys without hydronephrosis or hydroureter. No renal calculi. No  bulky retroperitoneal adenopathy. Minimally atherosclerotic  nonaneurysmal abdominal aorta. GI tract evaluation without focal  thickening or disproportionate dilatation. Colonic diverticulosis again  noted without evidence of acute diverticulitis. Pelvic viscera  demonstrate moderately distended urinary bladder with air in the  nondependent portion consistent with recent instrumentation.  Small  volume free fluid in the pelvis slightly increased from prior comparison  without loculated or well-defined peripherally enhancing fluid  collection to suggest intraperitoneal abscess formation. No bulky pelvic  adenopathy. No aggressive osseous findings with PLIF hardware again  noted in the lumbar spine. The ventral abdominal fluid collection has  been drained with residual stranding and wound packing material,  however, no discrete fluid collection is identified. No new fluid  collection with minimal body wall edema in the posterior segments.       Impression:       1. Minimal atelectasis of the right lower lobe with improved appearance  and resolution of previously noted pleural effusions.  2. Interval drainage of ventral abdominal phlegmonous process with  postsurgical stranding and wound packing material, however, no residual  focal fluid collection.  3. Small volume free fluid in the pelvis with increased from prior is  nonspecific. No loculated or well-defined peripheral enhancing  intraabdominal or intraperitoneal collection is identified.     D:  10/09/2017  E:  10/09/2017               Impression:   --Acute abdominal wall abscess/cellulitis at surgical site with Klebsiella/Enterobacter and culture at least to muscle but no obvious visible prosthetic material, no necrotic tissue and no feculent drainage at present.  I'm unable to confirm a enterocutaneous fistula but he is at risk and this will need to be monitored by colorectal surgery. Doing better and Dr Benjamin has reviewed CT with no new focal inflammatory focus.     --Acute small bowel obstruction associated with prior hernia repair/mesh, mesh removed and repeat hernia repair on September 29 as outlined above     --History anal carcinoma with prior treatment     --Sinus bradycardia per notes    --pancytopenia;  Monitor; ?chronic baseline;  IF steadily worse then ?more workup or medication change    PLAN:    --IV zosyn here and likely oral  augmentin when home    --I discussed potential risks and benefits of the prescribed antibiotics that include, but are not limited to, solid organ toxicity,  renal toxicity, CDiff, cytopenias, hypersensitivity,  etc.. Patient voices understanding and agree to proceed.     --Monitor IV and IV antibiotic with risk for systemic complication and potential drug interaction     --Check/review labs cultures and scans     --Highly complex set of issues with high risk for further serious morbidity and other serious sequela     --Discussed with microbiology, partial history per nursing staff.     --Colorectal surgery  Input seen; d/w Dr Benjamin    --monitor counts    --if home, f/u with me wed in clinic           Roddy Llanes MD  10/9/2017           Electronically signed by Roddy Llanes MD at 10/9/2017 10:22 AM      Sakina Pelaez DO at 10/9/2017 10:25 AM  Version 3 of [address] [place] [address] Medicine Services  INPATIENT PROGRESS NOTE    Date of Admission: 9/29/2017  Length of Stay: 10  Primary Care Physician: Megan Rose DO    Subjective   CC: f/u abdominal pain, post-op infection  HPI:  Doing okay today. Having lots of diarrhea overnight. Wants to go home. Abdominal wound still draining. Pain improved.    Review Of Systems:   Review of Systems  Negative for fever,chills, chest pain, headache, shortness of breath, nausea, vomiting.    Otherwise 10 system ROS negative except as noted pertinent positives above in HPI.     Objective      Temp:  [98.1 °F (36.7 °C)-98.2 °F (36.8 °C)] 98.1 °F (36.7 °C)  Heart Rate:  [51-53] 51  Resp:  [16-18] 16  BP: (100-121)/(73-82) 100/73  Physical Exam  GEN:Patient is ill appearing  male, alert and oriented x3  HEENT: AT/NC  NECK: Neck is without mass or JVD  CV: RRR no m/r/g, S1,S2  LUNGS: CTAB no w/r/r  ABD: dressing in place, drainage on bandage, minimal pain with palpation  SKIN: no rashes, lesions  NEURO: no focal deficits  PSYCH: Mood  is appropriate  EXT: no c/c/e/e    Results Review:    I have reviewed the labs, radiology results and diagnostic studies.      Results from last 7 days  Lab Units 10/09/17  0404   WBC 10*3/mm3 3.33*   HEMOGLOBIN g/dL 11.0*   PLATELETS 10*3/mm3 145*       Results from last 7 days  Lab Units 10/06/17  0507   SODIUM mmol/L 137   POTASSIUM mmol/L 3.5   CHLORIDE mmol/L 101   CO2 mmol/L 28.0   BUN mg/dL <5*   CREATININE mg/dL 0.70   GLUCOSE mg/dL 70   CALCIUM mg/dL 8.4*       Culture Data: Cultures:    Wound Culture   Date Value Ref Range Status   10/04/2017 Culture in progress  Preliminary       Radiology Data:     I have reviewed the medications.    Assessment/Plan     Problem List    Assessment/Plan:  1. Post- Operative Abscess s/p ex lap:  - s/p ex lap, lysis of adhesions and removal of mesh with umbilical hernia repair 9/29  - CT abd/pelvis showed presence of abscess along midline abdominal wall at incision site - I&D 10/4 at bedside  - Cx growing pan sensitive Klebsiella and Enterobacter, Appreciate ID assistance, Zosyn with transition to PO Augmentin at discharge  - repeat CT without any residual abscess or new fluid collection    2 . Post-Op ileus/Parital SBO:  -multiple BMs overnight and diarrhea     3. Sinus Bradycardia:  - asymptomatic, avoid AVN blocking drugs    4. C.diff +  - start PO vanc  - spoke with case management regarding obtaining PO vanc for him at discharge, can hopefully set up for d/c tomorrow    DVT prophylaxis: Heparin  Discharge Planning: hold discharge today. He is C.diff positive, still having a lot of watery diarrhea and not eating solid food. Will encourage fluid intake and start tx for C.diff. Hopeful to aim for d/c in the morning.    Sakina Pelaez DO   10/09/17   10:25 AM         Electronically signed by Sakina Pelaez DO at 10/9/2017 12:09 PM      Sakina Pelaez DO at 10/9/2017 10:25 AM  Version 2 of 3             [facility] Medicine Services  INPATIENT  PROGRESS NOTE    Date of Admission: 9/29/2017  Length of Stay: 10  Primary Care Physician: Megan Rose DO    Subjective   CC: f/u abdominal pain, post-op infection  HPI:  Doing okay today. Having lots of diarrhea overnight. Wants to go home. Abdominal wound still draining. Pain improved.    Review Of Systems:   Review of Systems  Negative for fever,chills, chest pain, headache, shortness of breath, nausea, vomiting.    Otherwise 10 system ROS negative except as noted pertinent positives above in HPI.     Objective      Temp:  [98.1 °F (36.7 °C)-98.2 °F (36.8 °C)] 98.1 °F (36.7 °C)  Heart Rate:  [51-53] 51  Resp:  [16-18] 16  BP: (100-121)/(73-82) 100/73  Physical Exam  GEN:Patient is ill appearing  male, alert and oriented x3  HEENT: AT/NC  NECK: Neck is without mass or JVD  CV: RRR no m/r/g, S1,S2  LUNGS: CTAB no w/r/r  ABD: dressing in place, drainage on bandage, minimal pain with palpation  SKIN: no rashes, lesions  NEURO: no focal deficits  PSYCH: Mood is appropriate  EXT: no c/c/e/e    Results Review:    I have reviewed the labs, radiology results and diagnostic studies.      Results from last 7 days  Lab Units 10/09/17  0404   WBC 10*3/mm3 3.33*   HEMOGLOBIN g/dL 11.0*   PLATELETS 10*3/mm3 145*       Results from last 7 days  Lab Units 10/06/17  0507   SODIUM mmol/L 137   POTASSIUM mmol/L 3.5   CHLORIDE mmol/L 101   CO2 mmol/L 28.0   BUN mg/dL <5*   CREATININE mg/dL 0.70   GLUCOSE mg/dL 70   CALCIUM mg/dL 8.4*       Culture Data: Cultures:    Wound Culture   Date Value Ref Range Status   10/04/2017 Culture in progress  Preliminary       Radiology Data:     I have reviewed the medications.    Assessment/Plan     Problem List    Assessment/Plan:  1. Post- Operative Abscess s/p ex lap:  - s/p ex lap, lysis of adhesions and removal of mesh with umbilical hernia repair 9/29  - CT abd/pelvis showed presence of abscess along midline abdominal wall at incision site - I&D 10/4 at bedside  - Cx growing pan  sensitive Klebsiella and Enterobacter, Appreciate ID assistance, Zosyn with transition to PO Augmentin at discharge  - repeat CT without any residual abscess or new fluid collection    2 . Post-Op ileus/Parital SBO:  -multiple BMs overnight and diarrhea - would check C.diff prior to d/c abx use    3. Sinus Bradycardia:  - asymptomatic, avoid AVN blocking drugs    DVT prophylaxis: Heparin  Discharge Planning: I expect patient to be discharged today per sugical team  Sakina Pelaez DO   10/09/17   10:25 AM         Electronically signed by Sakina Pelaez DO at 10/9/2017 10:47 AM      Sakina Pelaez DO at 10/9/2017 10:25 AM  Version 1 of 3             [facility] Medicine Services  INPATIENT PROGRESS NOTE    Date of Admission: 9/29/2017  Length of Stay: 10  Primary Care Physician: Megan Rose DO    Subjective   CC: f/u abdominal pain, post-op infection  HPI:  Doing okay today. Having lots of diarrhea overnight. Wants to go home. Abdominal wound still draining. Pain improved.    Review Of Systems:   Review of Systems  Negative for fever,chills, chest pain, headache, shortness of breath, nausea, vomiting.    Otherwise 10 system ROS negative except as noted pertinent positives above in HPI.     Objective      Temp:  [98.1 °F (36.7 °C)-98.2 °F (36.8 °C)] 98.1 °F (36.7 °C)  Heart Rate:  [51-53] 51  Resp:  [16-18] 16  BP: (100-121)/(73-82) 100/73  Physical Exam  GEN:Patient is ill appearing  male, alert and oriented x3  HEENT: AT/NC  NECK: Neck is without mass or JVD  CV: RRR no m/r/g, S1,S2  LUNGS: CTAB no w/r/r  ABD: dressing in place, drainage on bandage, minimal pain with palpation  SKIN: no rashes, lesions  NEURO: no focal deficits  PSYCH: Mood is appropriate  EXT: no c/c/e/e    Results Review:    I have reviewed the labs, radiology results and diagnostic studies.      Results from last 7 days  Lab Units 10/09/17  0404   WBC 10*3/mm3 3.33*   HEMOGLOBIN g/dL 11.0*   PLATELETS  10*3/mm3 145*       Results from last 7 days  Lab Units 10/06/17  0507   SODIUM mmol/L 137   POTASSIUM mmol/L 3.5   CHLORIDE mmol/L 101   CO2 mmol/L 28.0   BUN mg/dL <5*   CREATININE mg/dL 0.70   GLUCOSE mg/dL 70   CALCIUM mg/dL 8.4*       Culture Data: Cultures:    Wound Culture   Date Value Ref Range Status   10/04/2017 Culture in progress  Preliminary       Radiology Data:     I have reviewed the medications.    Assessment/Plan     Problem List    Assessment/Plan:  1. Post- Operative Abscess s/p ex lap:  - s/p ex lap, lysis of adhesions and removal of mesh with umbilical hernia repair 9/29  - CT abd/pelvis showed presence of abscess along midline abdominal wall at incision site - I&D 10/4 at bedside  - Cx growing pan sensitive Klebsiella and Enterobacter, Appreciate ID assistance, Zosyn with transition to PO Augmentin at discharge  - repeat CT without any residual abscess or new fluid collection    2 . Post-Op ileus/Parital SBO:  -multiple BMs overnight    3. Sinus Bradycardia:  - asymptomatic, avoid AVN blocking drugs    DVT prophylaxis: Heparin  Discharge Planning: I expect patient to be discharged today per sugical team  Sakina Pelaez DO   10/09/17   10:25 AM         Electronically signed by Sakina Pelaez DO at 10/9/2017 10:27 AM        Consult Notes (last 24 hours) (Notes from 10/8/2017  2:16 PM through 10/9/2017  2:16 PM)     No notes of this type exist for this encounter.

## 2017-10-09 NOTE — PROGRESS NOTES
Adult Nutrition  Assessment/PES    Patient Name:  Arthur Recinos  YOB: 1965  MRN: [id number]  Admit Date:  9/29/2017    Assessment Date:  10/9/2017    Comments:            Reason for Assessment       10/09/17 1810    Reason for Assessment    Reason For Assessment/Visit follow up protocol    Time Spent (min) 20    Diagnosis --   per notes this adm and    Infectious Disease C. diff              Nutrition/Diet History       10/09/17 1811    Nutrition/Diet History    Reported/Observed By Patient    Other Allows told needs to take solid food before disch (MD notes plan for disch 10/10)              Labs/Tests/Procedures/Meds       10/09/17 1812    Labs/Tests/Procedures/Meds    Labs/Tests Review Reviewed                Nutrition Prescription Ordered       10/09/17 1812    Nutrition Prescription PO    Current PO Diet Full Liquid   at time of visit            Evaluation of Received Nutrient/Fluid Intake       10/09/17 1812    PO Evaluation    Number of Meals 2    % PO Intake 50            Problem/Interventions:        Problem 1       10/09/17 1812    Nutrition Diagnoses Problem 1    Problem 1 Inadequate Intake/Infusion    Inadequate Intake Type Oral    Etiology (related to) --   clinical condition, diet order, GI status    Signs/Symptoms (evidenced by) Clear Liquid Diet;PO Intake   post-op ileus, nausea    Percent (%) intake recorded 50 %    Over number of meals 2                    Intervention Goal       10/09/17 1813    Intervention Goal    General Nutrition support treatment    PO Advance diet   as feasible            Nutrition Intervention       10/09/17 1813    Nutrition Intervention    RD/Tech Action Follow Tx progress;Care plan reviewd;Other (comment)   RN alerted re diet advance desirable if approp              Education/Evaluation       10/09/17 1814    Monitor/Evaluation    Monitor Per protocol;PO intake;Symptoms        Electronically signed by:  Omayra Ibarra RD  10/09/17 6:14 PM

## 2017-10-09 NOTE — PLAN OF CARE
Problem: Patient Care Overview (Adult)  Goal: Plan of Care Review  Outcome: Ongoing (interventions implemented as appropriate)  Goal: Discharge Needs Assessment  Outcome: Ongoing (interventions implemented as appropriate)  Goal: Plan of Care Review  Outcome: Ongoing (interventions implemented as appropriate)  Goal: Adult Individualization and Mutuality  Outcome: Ongoing (interventions implemented as appropriate)  Goal: Discharge Needs Assessment  Outcome: Ongoing (interventions implemented as appropriate)

## 2017-10-09 NOTE — PROGRESS NOTES
[facility] Medicine Services  INPATIENT PROGRESS NOTE    Date of Admission: 9/29/2017  Length of Stay: 10  Primary Care Physician: Megan Rose,     Subjective   CC: f/u abdominal pain, post-op infection  HPI:  Doing okay today. Having lots of diarrhea overnight. Wants to go home. Abdominal wound still draining. Pain improved.    Review Of Systems:   Review of Systems  Negative for fever,chills, chest pain, headache, shortness of breath, nausea, vomiting.    Otherwise 10 system ROS negative except as noted pertinent positives above in HPI.     Objective      Temp:  [98.1 °F (36.7 °C)-98.2 °F (36.8 °C)] 98.1 °F (36.7 °C)  Heart Rate:  [51-53] 51  Resp:  [16-18] 16  BP: (100-121)/(73-82) 100/73  Physical Exam  GEN:Patient is ill appearing  male, alert and oriented x3  HEENT: AT/NC  NECK: Neck is without mass or JVD  CV: RRR no m/r/g, S1,S2  LUNGS: CTAB no w/r/r  ABD: dressing in place, drainage on bandage, minimal pain with palpation  SKIN: no rashes, lesions  NEURO: no focal deficits  PSYCH: Mood is appropriate  EXT: no c/c/e/e    Results Review:    I have reviewed the labs, radiology results and diagnostic studies.      Results from last 7 days  Lab Units 10/09/17  0404   WBC 10*3/mm3 3.33*   HEMOGLOBIN g/dL 11.0*   PLATELETS 10*3/mm3 145*       Results from last 7 days  Lab Units 10/06/17  0507   SODIUM mmol/L 137   POTASSIUM mmol/L 3.5   CHLORIDE mmol/L 101   CO2 mmol/L 28.0   BUN mg/dL <5*   CREATININE mg/dL 0.70   GLUCOSE mg/dL 70   CALCIUM mg/dL 8.4*       Culture Data: Cultures:    Wound Culture   Date Value Ref Range Status   10/04/2017 Culture in progress  Preliminary       Radiology Data:     I have reviewed the medications.    Assessment/Plan     Problem List    Assessment/Plan:  1. Post- Operative Abscess s/p ex lap:  - s/p ex lap, lysis of adhesions and removal of mesh with umbilical hernia repair 9/29  - CT abd/pelvis showed presence of abscess along midline  abdominal wall at incision site - I&D 10/4 at bedside  - Cx growing pan sensitive Klebsiella and Enterobacter, Appreciate ID assistance, Zosyn with transition to PO Augmentin at discharge  - repeat CT without any residual abscess or new fluid collection    2 . Post-Op ileus/Parital SBO:  -multiple BMs overnight and diarrhea     3. Sinus Bradycardia:  - asymptomatic, avoid AVN blocking drugs    4. C.diff +  - start PO vanc  - spoke with case management regarding obtaining PO vanc for him at discharge, can hopefully set up for d/c tomorrow    DVT prophylaxis: Heparin  Discharge Planning: hold discharge today. He is C.diff positive, still having a lot of watery diarrhea and not eating solid food. Will encourage fluid intake and start tx for C.diff. Hopeful to aim for d/c in the morning.    Sakina Pelaez DO   10/09/17   10:25 AM

## 2017-10-09 NOTE — PROGRESS NOTES
[facility] Progress Note    9/29/2017      Antibiotics:  IV Anti-Infectives     Ordered     Dose/Rate Route Frequency Start Stop    10/09/17 0910  amoxicillin-clavulanate (AUGMENTIN) 875-125 MG per tablet     Ordering Provider:  Saul Benjamin MD    1 tablet Oral 2 Times Daily 10/09/17 0000 10/19/17 2359    10/03/17 1818  piperacillin-tazobactam (ZOSYN) 3.375 g in iso-osmotic dextrose 50 ml (premix)     Ordering Provider:  Saul Benjamin MD    3.375 g  over 0.5 Hours Intravenous Every 6 Hours 10/03/17 2000            CC:Bowel obstruction, abdominal wall infection    HPI:  Patient is a 52 y.o.  Yr old male with history of anal squamous cell carcinoma followed previously by Dr. Benjamin with prior radiation.  He has history of ventral hernia repair with mesh by another surgeon in 2014.  He has been followed by Dr. Benjamin since April 2017 with intermittent obstruction.  He is admitted on September 29 concerning for small bowel obstruction per radiology with surrounding fluid/inflammatory stranding.  He was taken for surgery on September 29 with exploratory laparotomy/lysis of adhesions/removal of mesh and umbilical hernia repair.  Postoperatively, he developed abdominal wall abscess with drainage by Dr. Benjamin on October 4, 2017.  Cultures with Klebsiella/Enterobacter.  Zosyn ongoing.     10/9/17 Anterior abdominal wall pain less near the surgical site which she describes as constant, sharp, nonradiating, worse with palpation, better with pain meds.  He denies any feculent drainage.  No gross hematuria or pyuria with respect to urinary tract and no dysuria/hesitancy or urgency.  No nausea vomiting.  He is passing gas and does have some bowel movement.  No hematochezia melena or hematemesis.  No shortness of breath or cough.  No rash. Diarrhea after laxatives     ROS:  No f/c/s. No n/v. No rash. No new ADR to Abx.     PE:   /73 (BP Location: Right arm, Patient Position: Lying)  Pulse 51  Temp 98.1 °F (36.7 °C)  "(Oral)   Resp 16  Ht 69\" (175.3 cm)  Wt 165 lb (74.8 kg)  SpO2 97%  BMI 24.37 kg/m2    GENERAL: Awake and alert, in no acute distress.   HEENT:  No conjunctival injection. No icterus. Oropharynx clear without evidence of thrush or exudate.     HEART: RRR; No murmur, rubs, gallops.   LUNGS: Clear to auscultation bilaterally without wheezing, rales, rhonchi. Normal respiratory effort. Nonlabored. No dullness.  ABDOMEN: Soft, minimally tender, nondistended. Positive bowel sounds. No rebound or guarding. NO mass or HSM.  EXT:  No cyanosis, clubbing or edema. No cord.  : Genitalia generally unremarkable.  Without Fuentes catheter.  SKIN: Warm and dry without cutaneous eruptions on Inspection/palpation.     Abdominal surgical site with open wound and some serosanguinous drainage.  Unable to visualize any prosthetic material and no discrete mass bulge or fluctuance.  No crepitus or bulla.  Vague tenderness.     Laboratory Data      Results from last 7 days  Lab Units 10/09/17  0404 10/07/17  0520 10/06/17  0507   WBC 10*3/mm3 3.33* 3.96 4.34   HEMOGLOBIN g/dL 11.0* 12.0* 11.7*   HEMATOCRIT % 32.2* 34.8* 33.8*   PLATELETS 10*3/mm3 145* 132* 130*       Results from last 7 days  Lab Units 10/06/17  0507   SODIUM mmol/L 137   POTASSIUM mmol/L 3.5   CHLORIDE mmol/L 101   CO2 mmol/L 28.0   BUN mg/dL <5*   CREATININE mg/dL 0.70   GLUCOSE mg/dL 70   CALCIUM mg/dL 8.4*                   Estimated Creatinine Clearance: 130.6 mL/min (by C-G formula based on Cr of 0.7).      Microbiology:      Radiology:  Imaging Results (last 72 hours)     Procedure Component Value Units Date/Time    CT Abdomen Pelvis With & Without Contrast [[id number]] Collected:  10/09/17 0739     Updated:  10/09/17 0804    Narrative:       EXAMINATION: CT ABDOMEN AND PELVIS W WO CONTRAST-      INDICATION: Postop from bowel obstruction operation.  Wound infection.   Please evaluate for intraabdominal infection.; K56.609-Unspecified  intestinal obstruction, " unspecified as to partial versus complete  obstruction; R10.84-Generalized abdominal pain; R31.9-Hematuria,  unspecified.      TECHNIQUE: CT abdomen and pelvis with and without intravenous contrast  administration.     The radiation dose reduction device was turned on for each scan per the  ALARA (As Low as Reasonably Achievable) protocol.     COMPARISON: CT dated 10/03/2017.     FINDINGS: Lung bases demonstrate subsegmental atelectasis and/or  scarring greatest within the right lower lobe far decreased from prior  comparison with resolution of previously noted effusions. Liver is  without focal lesion. Gallbladder unremarkable. Spleen is normal.  Pancreas without suspicious lesion. Adrenals without distinct nodule.  Kidneys without hydronephrosis or hydroureter. No renal calculi. No  bulky retroperitoneal adenopathy. Minimally atherosclerotic  nonaneurysmal abdominal aorta. GI tract evaluation without focal  thickening or disproportionate dilatation. Colonic diverticulosis again  noted without evidence of acute diverticulitis. Pelvic viscera  demonstrate moderately distended urinary bladder with air in the  nondependent portion consistent with recent instrumentation. Small  volume free fluid in the pelvis slightly increased from prior comparison  without loculated or well-defined peripherally enhancing fluid  collection to suggest intraperitoneal abscess formation. No bulky pelvic  adenopathy. No aggressive osseous findings with PLIF hardware again  noted in the lumbar spine. The ventral abdominal fluid collection has  been drained with residual stranding and wound packing material,  however, no discrete fluid collection is identified. No new fluid  collection with minimal body wall edema in the posterior segments.       Impression:       1. Minimal atelectasis of the right lower lobe with improved appearance  and resolution of previously noted pleural effusions.  2. Interval drainage of ventral abdominal  phlegmonous process with  postsurgical stranding and wound packing material, however, no residual  focal fluid collection.  3. Small volume free fluid in the pelvis with increased from prior is  nonspecific. No loculated or well-defined peripheral enhancing  intraabdominal or intraperitoneal collection is identified.     D:  10/09/2017  E:  10/09/2017               Impression:   --Acute abdominal wall abscess/cellulitis at surgical site with Klebsiella/Enterobacter and culture at least to muscle but no obvious visible prosthetic material, no necrotic tissue and no feculent drainage at present.  I'm unable to confirm a enterocutaneous fistula but he is at risk and this will need to be monitored by colorectal surgery. Doing better and Dr Benjamin has reviewed CT with no new focal inflammatory focus.     --Acute small bowel obstruction associated with prior hernia repair/mesh, mesh removed and repeat hernia repair on September 29 as outlined above     --History anal carcinoma with prior treatment     --Sinus bradycardia per notes    --pancytopenia;  Monitor; ?chronic baseline;  IF steadily worse then ?more workup or medication change    PLAN:    --IV zosyn here and likely oral augmentin when home    --I discussed potential risks and benefits of the prescribed antibiotics that include, but are not limited to, solid organ toxicity,  renal toxicity, CDiff, cytopenias, hypersensitivity,  etc.. Patient voices understanding and agree to proceed.     --Monitor IV and IV antibiotic with risk for systemic complication and potential drug interaction     --Check/review labs cultures and scans     --Highly complex set of issues with high risk for further serious morbidity and other serious sequela     --Discussed with microbiology, partial history per nursing staff.     --Colorectal surgery  Input seen; d/w Dr Benjamin    --monitor counts    --if home, f/u with me wed in clinic           Roddy Llanes MD  10/9/2017

## 2017-10-10 VITALS
OXYGEN SATURATION: 97 % | WEIGHT: 165 LBS | HEART RATE: 60 BPM | BODY MASS INDEX: 24.44 KG/M2 | SYSTOLIC BLOOD PRESSURE: 109 MMHG | HEIGHT: 69 IN | DIASTOLIC BLOOD PRESSURE: 83 MMHG | TEMPERATURE: 98.1 F | RESPIRATION RATE: 18 BRPM

## 2017-10-10 LAB
BASOPHILS # BLD AUTO: 0.01 10*3/MM3 (ref 0–0.2)
BASOPHILS NFR BLD AUTO: 0.3 % (ref 0–1)
DEPRECATED RDW RBC AUTO: 46.2 FL (ref 37–54)
EOSINOPHIL # BLD AUTO: 0.11 10*3/MM3 (ref 0–0.3)
EOSINOPHIL NFR BLD AUTO: 3.6 % (ref 0–3)
ERYTHROCYTE [DISTWIDTH] IN BLOOD BY AUTOMATED COUNT: 14.1 % (ref 11.3–14.5)
HCT VFR BLD AUTO: 35.2 % (ref 38.9–50.9)
HGB BLD-MCNC: 11.9 G/DL (ref 13.1–17.5)
IMM GRANULOCYTES # BLD: 0.01 10*3/MM3 (ref 0–0.03)
IMM GRANULOCYTES NFR BLD: 0.3 % (ref 0–0.6)
LYMPHOCYTES # BLD AUTO: 0.89 10*3/MM3 (ref 0.6–4.8)
LYMPHOCYTES NFR BLD AUTO: 29.1 % (ref 24–44)
MCH RBC QN AUTO: 30.6 PG (ref 27–31)
MCHC RBC AUTO-ENTMCNC: 33.8 G/DL (ref 32–36)
MCV RBC AUTO: 90.5 FL (ref 80–99)
MONOCYTES # BLD AUTO: 0.41 10*3/MM3 (ref 0–1)
MONOCYTES NFR BLD AUTO: 13.4 % (ref 0–12)
NEUTROPHILS # BLD AUTO: 1.63 10*3/MM3 (ref 1.5–8.3)
NEUTROPHILS NFR BLD AUTO: 53.3 % (ref 41–71)
PLATELET # BLD AUTO: 160 10*3/MM3 (ref 150–450)
PMV BLD AUTO: 10.1 FL (ref 6–12)
RBC # BLD AUTO: 3.89 10*6/MM3 (ref 4.2–5.76)
WBC NRBC COR # BLD: 3.06 10*3/MM3 (ref 3.5–10.8)

## 2017-10-10 PROCEDURE — 25010000002 HEPARIN (PORCINE) PER 1000 UNITS: Performed by: COLON & RECTAL SURGERY

## 2017-10-10 PROCEDURE — 85025 COMPLETE CBC W/AUTO DIFF WBC: CPT | Performed by: INTERNAL MEDICINE

## 2017-10-10 PROCEDURE — 25010000002 PIPERACILLIN SOD-TAZOBACTAM PER 1 G: Performed by: COLON & RECTAL SURGERY

## 2017-10-10 PROCEDURE — 25010000002 HYDROMORPHONE PER 4 MG: Performed by: COLON & RECTAL SURGERY

## 2017-10-10 PROCEDURE — 99239 HOSP IP/OBS DSCHRG MGMT >30: CPT | Performed by: INTERNAL MEDICINE

## 2017-10-10 RX ADMIN — HYDROMORPHONE HYDROCHLORIDE 1 MG: 1 INJECTION, SOLUTION INTRAMUSCULAR; INTRAVENOUS; SUBCUTANEOUS at 09:11

## 2017-10-10 RX ADMIN — HYDROMORPHONE HYDROCHLORIDE 1 MG: 1 INJECTION, SOLUTION INTRAMUSCULAR; INTRAVENOUS; SUBCUTANEOUS at 13:50

## 2017-10-10 RX ADMIN — Medication 125 MG: at 06:17

## 2017-10-10 RX ADMIN — Medication 125 MG: at 00:52

## 2017-10-10 RX ADMIN — TAZOBACTAM SODIUM AND PIPERACILLIN SODIUM 3.38 G: 375; 3 INJECTION, SOLUTION INTRAVENOUS at 04:18

## 2017-10-10 RX ADMIN — HEPARIN SODIUM 5000 UNITS: 5000 INJECTION, SOLUTION INTRAVENOUS; SUBCUTANEOUS at 06:17

## 2017-10-10 RX ADMIN — OXYCODONE HYDROCHLORIDE 10 MG: 5 TABLET ORAL at 06:24

## 2017-10-10 RX ADMIN — TAZOBACTAM SODIUM AND PIPERACILLIN SODIUM 3.38 G: 375; 3 INJECTION, SOLUTION INTRAVENOUS at 10:54

## 2017-10-10 RX ADMIN — ACETAMINOPHEN 1000 MG: 500 TABLET ORAL at 09:11

## 2017-10-10 RX ADMIN — HEPARIN SODIUM 5000 UNITS: 5000 INJECTION, SOLUTION INTRAVENOUS; SUBCUTANEOUS at 14:04

## 2017-10-10 RX ADMIN — Medication 250 MG: at 13:50

## 2017-10-10 NOTE — PROGRESS NOTES
[facility] Progress Note    9/29/2017      Antibiotics:  IV Anti-Infectives     Ordered     Dose/Rate Route Frequency Start Stop    10/10/17 0836  vancomycin oral solution 250 mg     Ordering Provider:  Roddy Llanes MD    250 mg Oral Every 6 Hours Scheduled 10/10/17 1200      10/09/17 0910  amoxicillin-clavulanate (AUGMENTIN) 875-125 MG per tablet     Ordering Provider:  Saul Benjamin MD    1 tablet Oral 2 Times Daily 10/09/17 0000 10/19/17 2359    10/09/17 1142  vancomycin (VANCOCIN) 125 MG capsule     Ordering Provider:  Sakina Pelaez DO    125 mg Oral 4 Times Daily 10/09/17 0000 10/19/17 2359    10/03/17 1818  piperacillin-tazobactam (ZOSYN) 3.375 g in iso-osmotic dextrose 50 ml (premix)     Ordering Provider:  Saul Benjamin MD    3.375 g  over 0.5 Hours Intravenous Every 6 Hours 10/03/17 2000            CC:Bowel obstruction, abdominal wall infection    HPI:  Patient is a 52 y.o.  Yr old male with history of anal squamous cell carcinoma followed previously by Dr. Benjamin with prior radiation.  He has history of ventral hernia repair with mesh by another surgeon in 2014.  He has been followed by Dr. Benjamin since April 2017 with intermittent obstruction.  He is admitted on September 29 concerning for small bowel obstruction per radiology with surrounding fluid/inflammatory stranding.  He was taken for surgery on September 29 with exploratory laparotomy/lysis of adhesions/removal of mesh and umbilical hernia repair.  Postoperatively, he developed abdominal wall abscess with drainage by Dr. Benjamin on October 4, 2017.  Cultures with Klebsiella/Enterobacter.  Zosyn ongoing.     10/10/17 Anterior abdominal wall pain less near the surgical site which she describes as constant, sharp, nonradiating, worse with palpation, better with pain meds.  He denies any feculent drainage.  No gross hematuria or pyuria with respect to urinary tract and no dysuria/hesitancy or urgency.  No nausea vomiting.   No hematochezia  "melena or hematemesis.  No shortness of breath or cough.  No rash. Extensive diarrhea on October 9, greater than 10 episodes and C. difficile PCR positivity     ROS:      10/10/17 No f/c/s. No n/v. No rash. No new ADR to Abx.     PE:   /83 (BP Location: Left arm, Patient Position: Lying)  Pulse 60  Temp 98.1 °F (36.7 °C) (Oral)   Resp 18  Ht 69\" (175.3 cm)  Wt 165 lb (74.8 kg)  SpO2 97%  BMI 24.37 kg/m2    GENERAL: Awake and alert, in no acute distress.   HEENT:  No conjunctival injection. No icterus. Oropharynx clear without evidence of thrush or exudate.     HEART: RRR; No murmur, rubs, gallops.   LUNGS: Clear to auscultation bilaterally without wheezing, rales, rhonchi. Normal respiratory effort. Nonlabored. No dullness.  ABDOMEN: Soft, minimally tender, nondistended. Positive bowel sounds. No rebound or guarding. NO mass or HSM.  EXT:  No cyanosis, clubbing or edema. No cord.  : Genitalia generally unremarkable.  Without Fuentes catheter.  SKIN: Warm and dry without cutaneous eruptions on Inspection/palpation.     Abdominal surgical site with open wound and some serosanguinous drainage.  Unable to visualize any prosthetic material and no discrete mass bulge or fluctuance.  No crepitus or bulla.  Vague tenderness.     Laboratory Data      Results from last 7 days  Lab Units 10/10/17  0632 10/09/17  0404 10/07/17  0520   WBC 10*3/mm3 3.06* 3.33* 3.96   HEMOGLOBIN g/dL 11.9* 11.0* 12.0*   HEMATOCRIT % 35.2* 32.2* 34.8*   PLATELETS 10*3/mm3 160 145* 132*       Results from last 7 days  Lab Units 10/06/17  0507   SODIUM mmol/L 137   POTASSIUM mmol/L 3.5   CHLORIDE mmol/L 101   CO2 mmol/L 28.0   BUN mg/dL <5*   CREATININE mg/dL 0.70   GLUCOSE mg/dL 70   CALCIUM mg/dL 8.4*                   Estimated Creatinine Clearance: 130.6 mL/min (by C-G formula based on Cr of 0.7).      Microbiology:      Radiology:  Imaging Results (last 72 hours)     Procedure Component Value Units Date/Time    CT Abdomen Pelvis " With & Without Contrast [[id number]] Collected:  10/09/17 0739     Updated:  10/09/17 0804    Narrative:       EXAMINATION: CT ABDOMEN AND PELVIS W WO CONTRAST-      INDICATION: Postop from bowel obstruction operation.  Wound infection.   Please evaluate for intraabdominal infection.; K56.609-Unspecified  intestinal obstruction, unspecified as to partial versus complete  obstruction; R10.84-Generalized abdominal pain; R31.9-Hematuria,  unspecified.      TECHNIQUE: CT abdomen and pelvis with and without intravenous contrast  administration.     The radiation dose reduction device was turned on for each scan per the  ALARA (As Low as Reasonably Achievable) protocol.     COMPARISON: CT dated 10/03/2017.     FINDINGS: Lung bases demonstrate subsegmental atelectasis and/or  scarring greatest within the right lower lobe far decreased from prior  comparison with resolution of previously noted effusions. Liver is  without focal lesion. Gallbladder unremarkable. Spleen is normal.  Pancreas without suspicious lesion. Adrenals without distinct nodule.  Kidneys without hydronephrosis or hydroureter. No renal calculi. No  bulky retroperitoneal adenopathy. Minimally atherosclerotic  nonaneurysmal abdominal aorta. GI tract evaluation without focal  thickening or disproportionate dilatation. Colonic diverticulosis again  noted without evidence of acute diverticulitis. Pelvic viscera  demonstrate moderately distended urinary bladder with air in the  nondependent portion consistent with recent instrumentation. Small  volume free fluid in the pelvis slightly increased from prior comparison  without loculated or well-defined peripherally enhancing fluid  collection to suggest intraperitoneal abscess formation. No bulky pelvic  adenopathy. No aggressive osseous findings with PLIF hardware again  noted in the lumbar spine. The ventral abdominal fluid collection has  been drained with residual stranding and wound packing  material,  however, no discrete fluid collection is identified. No new fluid  collection with minimal body wall edema in the posterior segments.       Impression:       1. Minimal atelectasis of the right lower lobe with improved appearance  and resolution of previously noted pleural effusions.  2. Interval drainage of ventral abdominal phlegmonous process with  postsurgical stranding and wound packing material, however, no residual  focal fluid collection.  3. Small volume free fluid in the pelvis with increased from prior is  nonspecific. No loculated or well-defined peripheral enhancing  intraabdominal or intraperitoneal collection is identified.     D:  10/09/2017  E:  10/09/2017               Impression:   --Acute abdominal wall abscess/cellulitis at surgical site with Klebsiella/Enterobacter and culture at least to muscle but no obvious visible prosthetic material, no necrotic tissue and no feculent drainage at present.  I'm unable to confirm a enterocutaneous fistula but he is at risk and this will need to be monitored by colorectal surgery. Doing better and Dr Benjamin has reviewed CT with no new focal inflammatory focus.    --Acute C. difficile colitis.  Numerous bowel movements October 9, oral vancomycin initiated.  He feels a bit better today.  If not steadily improving, you could give consideration to IV Flagyl or rectal vancomycin enemas in addition to further imaging such as CT scan, etc.. I discussed the risk and complexity of CDiff colitis with the patient/family. They understand the importance of hand hygiene with soap and water.  They know the importance of bleach containing cleaning solutions for solid surfaces and risk to contact.  They know to avoid anti motility agents such as lomotil/immodium and similar agents. They know antibiotics are not a guarantee for cure and there will be a risk for relapse/persistence of disease. I have referred them to CDC.gov for additional information as they see  fit.     --Acute small bowel obstruction associated with prior hernia repair/mesh, mesh removed and repeat hernia repair on September 29 as outlined above     --History anal carcinoma with prior treatment     --Sinus bradycardia per notes    --pancytopenia;  Monitor; ?chronic baseline;  IF steadily worse then ?more workup or medication change    PLAN:    --IV zosyn here / oral vancomycin solution    --I discussed potential risks and benefits of the prescribed antibiotics that include, but are not limited to, solid organ toxicity,  renal toxicity, CDiff, cytopenias, hypersensitivity,  etc.. Patient voices understanding and agree to proceed.     --Monitor IV and IV antibiotic with risk for systemic complication and potential drug interaction     --Check/review labs cultures and scans     --Highly complex set of issues with high risk for further serious morbidity and other serious sequela     --Discussed with microbiology, partial history per nursing staff.     --d/w Dr Pelaez    --monitor counts               Roddy Llanes MD  10/10/2017

## 2017-10-10 NOTE — PLAN OF CARE
Problem: Pain, Acute (Adult)  Goal: Acceptable Pain Control/Comfort Level  Outcome: Ongoing (interventions implemented as appropriate)    10/10/17 0447   Pain, Acute (Adult)   Acceptable Pain Control/Comfort Level making progress toward outcome         Problem: Patient Care Overview (Adult)  Goal: Plan of Care Review  Outcome: Ongoing (interventions implemented as appropriate)  Goal: Discharge Needs Assessment  Outcome: Ongoing (interventions implemented as appropriate)    09/29/17 0928 09/29/17 1346 10/07/17 1618   Discharge Needs Assessment   Concerns To Be Addressed --  --  denies needs/concerns at this time   Readmission Within The Last 30 Days --  --  no previous admission in last 30 days   Equipment Needed After Discharge none --  --    Current Health   Anticipated Changes Related to Illness none --  --    Living Environment   Transportation Available car;family or friend will provide --  --    Self-Care   Equipment Currently Used at Home --  none --        Goal: Plan of Care Review  Outcome: Ongoing (interventions implemented as appropriate)    10/07/17 1618 10/10/17 0000   Coping/Psychosocial Response Interventions   Plan Of Care Reviewed With --  patient   Patient Care Overview   Progress progress toward functional goals is gradual --        Goal: Adult Individualization and Mutuality  Outcome: Ongoing (interventions implemented as appropriate)    10/02/17 1852 10/09/17 0245   Individualization   Patient Specific Preferences pt prefers family to do bath when they are here, privacy --    Patient Specific Goals --  when he can go home   Patient Specific Interventions --  encourage to ambulate   Mutuality/Individual Preferences   What Anxieties, Fears or Concerns Do You Have About Your Health or Care? none --    What Questions Do You Have About Your Health or Care? none --    What Information Would Help Us Give You More Personalized Care? none --        Goal: Discharge Needs Assessment  Outcome: Ongoing  (interventions implemented as appropriate)    09/29/17 0928 09/29/17 1346 10/07/17 1618   Discharge Needs Assessment   Concerns To Be Addressed --  --  denies needs/concerns at this time   Readmission Within The Last 30 Days --  --  no previous admission in last 30 days   Equipment Needed After Discharge none --  --    Current Health   Anticipated Changes Related to Illness none --  --    Living Environment   Transportation Available car;family or friend will provide --  --    Self-Care   Equipment Currently Used at Home --  none --          Problem: Perioperative Period (Adult)  Goal: Signs and Symptoms of Listed Potential Problems Will be Absent or Manageable (Perioperative Period)  Outcome: Ongoing (interventions implemented as appropriate)    10/05/17 1839 10/09/17 0245   Perioperative Period   Problems Assessed (Perioperative Period) all --    Problems Present (Perioperative Period) --  infection;pain;postoperative ileus         Problem: Infection, Risk/Actual (Adult)  Goal: Identify Related Risk Factors and Signs and Symptoms  Outcome: Ongoing (interventions implemented as appropriate)  Goal: Infection Prevention/Resolution  Outcome: Ongoing (interventions implemented as appropriate)    10/10/17 1697   Infection, Risk/Actual (Adult)   Infection Prevention/Resolution making progress toward outcome

## 2017-10-10 NOTE — PROGRESS NOTES
Continued Stay Note  [facility]     Patient Name: Arthur Recinos  MRN: [id number]  Today's Date: 10/10/2017    Admit Date: 9/29/2017          Discharge Plan       10/10/17 1423    Case Management/Social Work Plan    Plan oral vanc    Patient/Family In Agreement With Plan yes    Additional Comments Spoke with Isa at [facility] and she is aware of new dose of oral vanc for home and that pt going home today. Per Ias at [facility], they will deliver oral Vanc to pt room today prior to discharge. Pt is aware and agreable. Family will  provide pt transportation home.               Discharge Codes       10/10/17 1414    Discharge Codes    Discharge Codes 01  Discharge to home        Expected Discharge Date and Time     Expected Discharge Date Expected Discharge Time    Oct 10, 2017             Henrietta Cheng RN

## 2017-10-10 NOTE — DISCHARGE SUMMARY
[facility] Medicine Services  DISCHARGE SUMMARY       Date of Admission: 9/29/2017  Date of Discharge:  10/10/2017  Primary Care Physician: Megan Rose DO  Consulting Physician(s)     Provider Relationship Specialty    Saul Benjamin MD Consulting Physician Colon and Rectal Surgery    Roddy Llanes MD Consulting Physician Infectious Diseases          Discharge Diagnoses:  Active Hospital Problems (** Indicates Principal Problem)    Diagnosis Date Noted   No active problems to display.      Resolved Hospital Problems    Diagnosis Date Noted Date Resolved   • **Wound infection after surgery [T81.4XXA] 10/05/2017 10/09/2017   • Small bowel obstruction [K56.609] 09/29/2017 10/09/2017   • Bradycardia [R00.1] 08/28/2017 10/09/2017       Presenting Problem/History of Present Illness  Small bowel obstruction [K56.609]     Chief Complaint on Day of Discharge: f/u abdominal pain    History of Present Illness on Day of Discharge:   Patient feels better this morning. Diarrhea improved. Eating more solid food. Drinking lots of fluid. Denies increased abominal pain or distention. Denies fever/chills.    Hospital Course  Patient is a 52 y.o. male presented with recurrent partial small bowel obstruction.  Several x-rays showed only partial obstruction until this admission when there was a full obstruction.  He was operated on the day of admission and the jejunum was wrapped in an umbilical hernia mesh. The mesh was removed.  He had a bezoar proximal to the obstruction and this caused an impressive delay in return of bowel function.  A wound infection was drained at the bedside which grew out Klebsiella and Enterobacter.  Zosyn was started and infectious disease was consulted who recommended Augmentin twice a day for 7 days at discharge. Patent developed diarrhea yesterday prior to his d/c. A C.diff antigen came back positive and oral vancomycin was started and his diarrhea significantly  "improved the following day.     Patient's ileus continued for several days after drainage of the abscess but a repeat CT scan on 10/8 showed no new abscess and contrast in the colon. His wound appeared clean. Surgery cleared his for discharge with close follow up later in the week with Dr. Garcia.  He is to shower at least once a day and wash his wound out.  No further packing is required.  He will follow up with ID clinic later in the week as well.    Procedures Performed  Procedure(s):  LAPAROTOMY EXPLORATORY, LYSIS OF ADHESIONS, REMOVAL OF MESH PLACED DURING PREVIOUS PROCEDURE, UMBILICAL HERNIA REPAIR       Consults:   Consults     Date and Time Order Name Status Description    10/7/2017 1310 Inpatient Consult to Infectious Diseases Completed     9/29/2017 0836 Surgery (on-call MD unless specified) Completed           Pertinent Test Results: as above    Condition on Dischargestable    Physical Exam on Discharge:/83 (BP Location: Left arm, Patient Position: Lying)  Pulse 60  Temp 98.1 °F (36.7 °C) (Oral)   Resp 18  Ht 69\" (175.3 cm)  Wt 165 lb (74.8 kg)  SpO2 97%  BMI 24.37 kg/m2  Physical Exam  GEN:Patient is pleasant  male, alert and oriented x3  HEENT: AT/NC  NECK: Neck is without mass or JVD  CV: RRR no m/r/g, S1,S2  LUNGS: CTAB no w/r/r  ABD: dressing in place, drainage on bandage, minimal pain with palpation  SKIN: no rashes, lesions  NEURO: no focal deficits  PSYCH: Mood is appropriate  EXT: no c/c/e/e    Discharge Disposition  Home or Self Care    Discharge Medications   Arthur Recinos   Home Medication Instructions MERRICK:[id number]    Printed on:10/10/17 4737   Medication Information                      amoxicillin-clavulanate (AUGMENTIN) 875-125 MG per tablet  Take 1 tablet by mouth 2 (Two) Times a Day for 10 days.             HYDROcodone-acetaminophen (NORCO) 7.5-325 MG per tablet  Take 1 tablet by mouth Every 6 (Six) Hours As Needed for Moderate Pain .             ondansetron " (ZOFRAN) 4 MG tablet  Take 4 mg by mouth Every 6 (Six) Hours As Needed for Nausea or Vomiting.             saccharomyces boulardii (FLORASTOR) 250 MG capsule  Take 1 capsule by mouth 2 (Two) Times a Day for 30 days.             vancomycin 50 MG/ML solution oral solution  Take 5 mL by mouth Every 6 (Six) Hours for 10 days. Indications: Clostridium Difficile Infection                 Discharge Diet: as tolerated    Discharge Care Plan / Instructions: home     Activity at Discharge: as tolerated    Follow-up Appointments  Future Appointments  Date Time Provider Department Center   8/21/2018 3:00 PM Elizabeth Schulz MD [facility]       Test Results Pending at Discharge: none       Sakina Pelaez DO 10/10/17 2:08 PM    Time: >30 minutes    Please note that portions of this note may have been completed with a voice recognition program. Efforts were made to edit the dictations, but occasionally words are mistranscribed.

## 2017-10-11 ENCOUNTER — TRANSCRIBE ORDERS (OUTPATIENT)
Dept: LAB | Facility: HOSPITAL | Age: 52
End: 2017-10-11

## 2017-10-11 ENCOUNTER — LAB (OUTPATIENT)
Dept: LAB | Facility: HOSPITAL | Age: 52
End: 2017-10-11

## 2017-10-11 DIAGNOSIS — D61.818 ACQUIRED PANCYTOPENIA (HCC): ICD-10-CM

## 2017-10-11 DIAGNOSIS — A04.71 RECURRENT CLOSTRIDIUM DIFFICILE DIARRHEA: ICD-10-CM

## 2017-10-11 DIAGNOSIS — L03.311 CELLULITIS OF ABDOMINAL WALL: ICD-10-CM

## 2017-10-11 DIAGNOSIS — A04.71 RECURRENT CLOSTRIDIUM DIFFICILE DIARRHEA: Primary | ICD-10-CM

## 2017-10-11 LAB
ALBUMIN SERPL-MCNC: 4.2 G/DL (ref 3.2–4.8)
ALBUMIN/GLOB SERPL: 1.9 G/DL (ref 1.5–2.5)
ALP SERPL-CCNC: 102 U/L (ref 25–100)
ALT SERPL W P-5'-P-CCNC: 11 U/L (ref 7–40)
ANION GAP SERPL CALCULATED.3IONS-SCNC: 10 MMOL/L (ref 3–11)
AST SERPL-CCNC: 15 U/L (ref 0–33)
BASOPHILS # BLD AUTO: 0.02 10*3/MM3 (ref 0–0.2)
BASOPHILS NFR BLD AUTO: 0.2 % (ref 0–1)
BILIRUB SERPL-MCNC: 0.3 MG/DL (ref 0.3–1.2)
BUN BLD-MCNC: <5 MG/DL (ref 9–23)
BUN/CREAT SERPL: ABNORMAL (ref 7–25)
CALCIUM SPEC-SCNC: 9.5 MG/DL (ref 8.7–10.4)
CHLORIDE SERPL-SCNC: 110 MMOL/L (ref 99–109)
CO2 SERPL-SCNC: 26 MMOL/L (ref 20–31)
CREAT BLD-MCNC: 0.9 MG/DL (ref 0.6–1.3)
DEPRECATED RDW RBC AUTO: 46.7 FL (ref 37–54)
EOSINOPHIL # BLD AUTO: 0.1 10*3/MM3 (ref 0–0.3)
EOSINOPHIL NFR BLD AUTO: 1.2 % (ref 0–3)
ERYTHROCYTE [DISTWIDTH] IN BLOOD BY AUTOMATED COUNT: 14.2 % (ref 11.3–14.5)
GFR SERPL CREATININE-BSD FRML MDRD: 89 ML/MIN/1.73
GLOBULIN UR ELPH-MCNC: 2.2 GM/DL
GLUCOSE BLD-MCNC: 74 MG/DL (ref 70–100)
HCT VFR BLD AUTO: 36.7 % (ref 38.9–50.9)
HGB BLD-MCNC: 12.3 G/DL (ref 13.1–17.5)
IMM GRANULOCYTES # BLD: 0.02 10*3/MM3 (ref 0–0.03)
IMM GRANULOCYTES NFR BLD: 0.2 % (ref 0–0.6)
LYMPHOCYTES # BLD AUTO: 0.82 10*3/MM3 (ref 0.6–4.8)
LYMPHOCYTES NFR BLD AUTO: 10 % (ref 24–44)
MCH RBC QN AUTO: 30.4 PG (ref 27–31)
MCHC RBC AUTO-ENTMCNC: 33.5 G/DL (ref 32–36)
MCV RBC AUTO: 90.8 FL (ref 80–99)
MONOCYTES # BLD AUTO: 0.55 10*3/MM3 (ref 0–1)
MONOCYTES NFR BLD AUTO: 6.7 % (ref 0–12)
NEUTROPHILS # BLD AUTO: 6.72 10*3/MM3 (ref 1.5–8.3)
NEUTROPHILS NFR BLD AUTO: 81.7 % (ref 41–71)
PLATELET # BLD AUTO: 238 10*3/MM3 (ref 150–450)
PMV BLD AUTO: 9.9 FL (ref 6–12)
POTASSIUM BLD-SCNC: 3.8 MMOL/L (ref 3.5–5.5)
PROT SERPL-MCNC: 6.4 G/DL (ref 5.7–8.2)
RBC # BLD AUTO: 4.04 10*6/MM3 (ref 4.2–5.76)
SODIUM BLD-SCNC: 146 MMOL/L (ref 132–146)
WBC NRBC COR # BLD: 8.23 10*3/MM3 (ref 3.5–10.8)

## 2017-10-11 PROCEDURE — 80053 COMPREHEN METABOLIC PANEL: CPT | Performed by: INTERNAL MEDICINE

## 2017-10-11 PROCEDURE — 85025 COMPLETE CBC W/AUTO DIFF WBC: CPT | Performed by: INTERNAL MEDICINE

## 2017-10-11 PROCEDURE — 36415 COLL VENOUS BLD VENIPUNCTURE: CPT

## 2017-10-25 ENCOUNTER — TRANSCRIBE ORDERS (OUTPATIENT)
Dept: LAB | Facility: HOSPITAL | Age: 52
End: 2017-10-25

## 2017-10-25 ENCOUNTER — LAB (OUTPATIENT)
Dept: LAB | Facility: HOSPITAL | Age: 52
End: 2017-10-25

## 2017-10-25 DIAGNOSIS — A04.72 INTESTINAL INFECTION DUE TO CLOSTRIDIUM DIFFICILE: ICD-10-CM

## 2017-10-25 DIAGNOSIS — A04.72 INTESTINAL INFECTION DUE TO CLOSTRIDIUM DIFFICILE: Primary | ICD-10-CM

## 2017-10-25 LAB
ALBUMIN SERPL-MCNC: 4.6 G/DL (ref 3.2–4.8)
ALBUMIN/GLOB SERPL: 1.9 G/DL (ref 1.5–2.5)
ALP SERPL-CCNC: 89 U/L (ref 25–100)
ALT SERPL W P-5'-P-CCNC: 26 U/L (ref 7–40)
ANION GAP SERPL CALCULATED.3IONS-SCNC: 3 MMOL/L (ref 3–11)
AST SERPL-CCNC: 25 U/L (ref 0–33)
BASOPHILS # BLD AUTO: 0.03 10*3/MM3 (ref 0–0.2)
BASOPHILS NFR BLD AUTO: 0.6 % (ref 0–1)
BILIRUB SERPL-MCNC: 0.4 MG/DL (ref 0.3–1.2)
BUN BLD-MCNC: 13 MG/DL (ref 9–23)
BUN/CREAT SERPL: 14.4 (ref 7–25)
CALCIUM SPEC-SCNC: 9.7 MG/DL (ref 8.7–10.4)
CHLORIDE SERPL-SCNC: 106 MMOL/L (ref 99–109)
CO2 SERPL-SCNC: 29 MMOL/L (ref 20–31)
CREAT BLD-MCNC: 0.9 MG/DL (ref 0.6–1.3)
DEPRECATED RDW RBC AUTO: 45.2 FL (ref 37–54)
EOSINOPHIL # BLD AUTO: 0.12 10*3/MM3 (ref 0–0.3)
EOSINOPHIL NFR BLD AUTO: 2.3 % (ref 0–3)
ERYTHROCYTE [DISTWIDTH] IN BLOOD BY AUTOMATED COUNT: 13.9 % (ref 11.3–14.5)
GFR SERPL CREATININE-BSD FRML MDRD: 89 ML/MIN/1.73
GLOBULIN UR ELPH-MCNC: 2.4 GM/DL
GLUCOSE BLD-MCNC: 81 MG/DL (ref 70–100)
HCT VFR BLD AUTO: 37.6 % (ref 38.9–50.9)
HGB BLD-MCNC: 12.9 G/DL (ref 13.1–17.5)
IMM GRANULOCYTES # BLD: 0.02 10*3/MM3 (ref 0–0.03)
IMM GRANULOCYTES NFR BLD: 0.4 % (ref 0–0.6)
LYMPHOCYTES # BLD AUTO: 1.28 10*3/MM3 (ref 0.6–4.8)
LYMPHOCYTES NFR BLD AUTO: 24.2 % (ref 24–44)
MCH RBC QN AUTO: 30.4 PG (ref 27–31)
MCHC RBC AUTO-ENTMCNC: 34.3 G/DL (ref 32–36)
MCV RBC AUTO: 88.5 FL (ref 80–99)
MONOCYTES # BLD AUTO: 0.55 10*3/MM3 (ref 0–1)
MONOCYTES NFR BLD AUTO: 10.4 % (ref 0–12)
NEUTROPHILS # BLD AUTO: 3.3 10*3/MM3 (ref 1.5–8.3)
NEUTROPHILS NFR BLD AUTO: 62.1 % (ref 41–71)
PLATELET # BLD AUTO: 195 10*3/MM3 (ref 150–450)
PMV BLD AUTO: 10.8 FL (ref 6–12)
POTASSIUM BLD-SCNC: 4.5 MMOL/L (ref 3.5–5.5)
PROT SERPL-MCNC: 7 G/DL (ref 5.7–8.2)
RBC # BLD AUTO: 4.25 10*6/MM3 (ref 4.2–5.76)
SODIUM BLD-SCNC: 138 MMOL/L (ref 132–146)
WBC NRBC COR # BLD: 5.3 10*3/MM3 (ref 3.5–10.8)

## 2017-10-25 PROCEDURE — 85025 COMPLETE CBC W/AUTO DIFF WBC: CPT | Performed by: INTERNAL MEDICINE

## 2017-10-25 PROCEDURE — 36415 COLL VENOUS BLD VENIPUNCTURE: CPT | Performed by: INTERNAL MEDICINE

## 2017-10-25 PROCEDURE — 80053 COMPREHEN METABOLIC PANEL: CPT | Performed by: INTERNAL MEDICINE

## 2017-11-02 ENCOUNTER — TRANSCRIBE ORDERS (OUTPATIENT)
Dept: ADMINISTRATIVE | Facility: HOSPITAL | Age: 52
End: 2017-11-02

## 2017-11-02 DIAGNOSIS — R10.9 ABDOMINAL PAIN, UNSPECIFIED ABDOMINAL LOCATION: ICD-10-CM

## 2017-11-02 DIAGNOSIS — R11.11 VOMITING WITHOUT NAUSEA, INTRACTABILITY OF VOMITING NOT SPECIFIED, UNSPECIFIED VOMITING TYPE: Primary | ICD-10-CM

## 2017-11-03 ENCOUNTER — HOSPITAL ENCOUNTER (OUTPATIENT)
Dept: GENERAL RADIOLOGY | Facility: HOSPITAL | Age: 52
Discharge: HOME | End: 2017-11-03
Attending: COLON & RECTAL SURGERY | Admitting: COLON & RECTAL SURGERY

## 2017-11-03 DIAGNOSIS — R10.9 ABDOMINAL PAIN, UNSPECIFIED ABDOMINAL LOCATION: ICD-10-CM

## 2017-11-03 DIAGNOSIS — R11.11 VOMITING WITHOUT NAUSEA, INTRACTABILITY OF VOMITING NOT SPECIFIED, UNSPECIFIED VOMITING TYPE: ICD-10-CM

## 2017-11-03 PROCEDURE — 74245: CPT

## 2017-11-03 PROCEDURE — 0 DIATRIZOATE MEGLUMINE & SODIUM PER 1 ML: Performed by: COLON & RECTAL SURGERY

## 2017-11-03 RX ADMIN — DIATRIZOATE MEGLUMINE AND DIATRIZOATE SODIUM 240 ML: 660; 100 LIQUID ORAL; RECTAL at 08:35

## 2017-11-06 ENCOUNTER — TRANSCRIBE ORDERS (OUTPATIENT)
Dept: LAB | Facility: HOSPITAL | Age: 52
End: 2017-11-06

## 2017-11-06 ENCOUNTER — LAB (OUTPATIENT)
Dept: LAB | Facility: HOSPITAL | Age: 52
End: 2017-11-06

## 2017-11-06 DIAGNOSIS — A04.72 INTESTINAL INFECTION DUE TO CLOSTRIDIUM DIFFICILE: Primary | ICD-10-CM

## 2017-11-06 DIAGNOSIS — L03.311 CELLULITIS OF ABDOMINAL WALL: ICD-10-CM

## 2017-11-06 DIAGNOSIS — A04.72 INTESTINAL INFECTION DUE TO CLOSTRIDIUM DIFFICILE: ICD-10-CM

## 2017-11-06 LAB
ALBUMIN SERPL-MCNC: 4.5 G/DL (ref 3.2–4.8)
ALBUMIN/GLOB SERPL: 2.1 G/DL (ref 1.5–2.5)
ALP SERPL-CCNC: 77 U/L (ref 25–100)
ALT SERPL W P-5'-P-CCNC: 11 U/L (ref 7–40)
ANION GAP SERPL CALCULATED.3IONS-SCNC: 0 MMOL/L (ref 3–11)
AST SERPL-CCNC: 14 U/L (ref 0–33)
BASOPHILS # BLD AUTO: 0.02 10*3/MM3 (ref 0–0.2)
BASOPHILS NFR BLD AUTO: 0.3 % (ref 0–1)
BILIRUB SERPL-MCNC: 0.3 MG/DL (ref 0.3–1.2)
BUN BLD-MCNC: 11 MG/DL (ref 9–23)
BUN/CREAT SERPL: 11 (ref 7–25)
CALCIUM SPEC-SCNC: 9.3 MG/DL (ref 8.7–10.4)
CHLORIDE SERPL-SCNC: 112 MMOL/L (ref 99–109)
CO2 SERPL-SCNC: 29 MMOL/L (ref 20–31)
CREAT BLD-MCNC: 1 MG/DL (ref 0.6–1.3)
DEPRECATED RDW RBC AUTO: 44.8 FL (ref 37–54)
EOSINOPHIL # BLD AUTO: 0.1 10*3/MM3 (ref 0–0.3)
EOSINOPHIL NFR BLD AUTO: 1.6 % (ref 0–3)
ERYTHROCYTE [DISTWIDTH] IN BLOOD BY AUTOMATED COUNT: 13.8 % (ref 11.3–14.5)
GFR SERPL CREATININE-BSD FRML MDRD: 78 ML/MIN/1.73
GLOBULIN UR ELPH-MCNC: 2.1 GM/DL
GLUCOSE BLD-MCNC: 76 MG/DL (ref 70–100)
HCT VFR BLD AUTO: 38.1 % (ref 38.9–50.9)
HGB BLD-MCNC: 13.6 G/DL (ref 13.1–17.5)
IMM GRANULOCYTES # BLD: 0.02 10*3/MM3 (ref 0–0.03)
IMM GRANULOCYTES NFR BLD: 0.3 % (ref 0–0.6)
LYMPHOCYTES # BLD AUTO: 1.39 10*3/MM3 (ref 0.6–4.8)
LYMPHOCYTES NFR BLD AUTO: 22.6 % (ref 24–44)
MCH RBC QN AUTO: 31.9 PG (ref 27–31)
MCHC RBC AUTO-ENTMCNC: 35.7 G/DL (ref 32–36)
MCV RBC AUTO: 89.4 FL (ref 80–99)
MONOCYTES # BLD AUTO: 0.61 10*3/MM3 (ref 0–1)
MONOCYTES NFR BLD AUTO: 9.9 % (ref 0–12)
NEUTROPHILS # BLD AUTO: 4.01 10*3/MM3 (ref 1.5–8.3)
NEUTROPHILS NFR BLD AUTO: 65.3 % (ref 41–71)
PLATELET # BLD AUTO: 145 10*3/MM3 (ref 150–450)
PMV BLD AUTO: 10.9 FL (ref 6–12)
POTASSIUM BLD-SCNC: 4.3 MMOL/L (ref 3.5–5.5)
PROT SERPL-MCNC: 6.6 G/DL (ref 5.7–8.2)
RBC # BLD AUTO: 4.26 10*6/MM3 (ref 4.2–5.76)
SODIUM BLD-SCNC: 141 MMOL/L (ref 132–146)
WBC NRBC COR # BLD: 6.15 10*3/MM3 (ref 3.5–10.8)

## 2017-11-06 PROCEDURE — 80053 COMPREHEN METABOLIC PANEL: CPT | Performed by: INTERNAL MEDICINE

## 2017-11-06 PROCEDURE — 36415 COLL VENOUS BLD VENIPUNCTURE: CPT | Performed by: INTERNAL MEDICINE

## 2017-11-06 PROCEDURE — 85025 COMPLETE CBC W/AUTO DIFF WBC: CPT | Performed by: INTERNAL MEDICINE

## 2018-01-19 ENCOUNTER — HOSPITAL ENCOUNTER (EMERGENCY)
Facility: HOSPITAL | Age: 53
Discharge: HOME | End: 2018-01-19
Attending: EMERGENCY MEDICINE | Admitting: EMERGENCY MEDICINE

## 2018-01-19 ENCOUNTER — APPOINTMENT (OUTPATIENT)
Dept: CT IMAGING | Facility: HOSPITAL | Age: 53
End: 2018-01-19

## 2018-01-19 VITALS
OXYGEN SATURATION: 98 % | WEIGHT: 168 LBS | DIASTOLIC BLOOD PRESSURE: 80 MMHG | RESPIRATION RATE: 16 BRPM | TEMPERATURE: 97.6 F | BODY MASS INDEX: 24.88 KG/M2 | HEART RATE: 54 BPM | HEIGHT: 69 IN | SYSTOLIC BLOOD PRESSURE: 109 MMHG

## 2018-01-19 DIAGNOSIS — R10.84 GENERALIZED ABDOMINAL PAIN: Primary | ICD-10-CM

## 2018-01-19 DIAGNOSIS — K52.9 GASTROENTERITIS: ICD-10-CM

## 2018-01-19 LAB
ALBUMIN SERPL-MCNC: 4.7 G/DL (ref 3.2–4.8)
ALBUMIN/GLOB SERPL: 1.6 G/DL (ref 1.5–2.5)
ALP SERPL-CCNC: 77 U/L (ref 25–100)
ALT SERPL W P-5'-P-CCNC: 14 U/L (ref 7–40)
ANION GAP SERPL CALCULATED.3IONS-SCNC: 6 MMOL/L (ref 3–11)
AST SERPL-CCNC: 18 U/L (ref 0–33)
BACTERIA UR QL AUTO: ABNORMAL /HPF
BASOPHILS # BLD AUTO: 0.02 10*3/MM3 (ref 0–0.2)
BASOPHILS NFR BLD AUTO: 0.3 % (ref 0–1)
BILIRUB SERPL-MCNC: 0.4 MG/DL (ref 0.3–1.2)
BILIRUB UR QL STRIP: NEGATIVE
BUN BLD-MCNC: 11 MG/DL (ref 9–23)
BUN/CREAT SERPL: 10 (ref 7–25)
CALCIUM SPEC-SCNC: 9.6 MG/DL (ref 8.7–10.4)
CHLORIDE SERPL-SCNC: 104 MMOL/L (ref 99–109)
CLARITY UR: CLEAR
CO2 SERPL-SCNC: 28 MMOL/L (ref 20–31)
COLOR UR: YELLOW
CREAT BLD-MCNC: 1.1 MG/DL (ref 0.6–1.3)
DEPRECATED RDW RBC AUTO: 43 FL (ref 37–54)
EOSINOPHIL # BLD AUTO: 0.1 10*3/MM3 (ref 0–0.3)
EOSINOPHIL NFR BLD AUTO: 1.5 % (ref 0–3)
ERYTHROCYTE [DISTWIDTH] IN BLOOD BY AUTOMATED COUNT: 13.2 % (ref 11.3–14.5)
GFR SERPL CREATININE-BSD FRML MDRD: 70 ML/MIN/1.73
GLOBULIN UR ELPH-MCNC: 2.9 GM/DL
GLUCOSE BLD-MCNC: 94 MG/DL (ref 70–100)
GLUCOSE UR STRIP-MCNC: NEGATIVE MG/DL
HCT VFR BLD AUTO: 40.9 % (ref 38.9–50.9)
HGB BLD-MCNC: 14.2 G/DL (ref 13.1–17.5)
HGB UR QL STRIP.AUTO: ABNORMAL
HOLD SPECIMEN: NORMAL
HOLD SPECIMEN: NORMAL
HYALINE CASTS UR QL AUTO: ABNORMAL /LPF
IMM GRANULOCYTES # BLD: 0.01 10*3/MM3 (ref 0–0.03)
IMM GRANULOCYTES NFR BLD: 0.2 % (ref 0–0.6)
KETONES UR QL STRIP: NEGATIVE
LEUKOCYTE ESTERASE UR QL STRIP.AUTO: NEGATIVE
LIPASE SERPL-CCNC: 53 U/L (ref 6–51)
LYMPHOCYTES # BLD AUTO: 1.72 10*3/MM3 (ref 0.6–4.8)
LYMPHOCYTES NFR BLD AUTO: 25.9 % (ref 24–44)
MCH RBC QN AUTO: 31.1 PG (ref 27–31)
MCHC RBC AUTO-ENTMCNC: 34.7 G/DL (ref 32–36)
MCV RBC AUTO: 89.5 FL (ref 80–99)
MONOCYTES # BLD AUTO: 0.67 10*3/MM3 (ref 0–1)
MONOCYTES NFR BLD AUTO: 10.1 % (ref 0–12)
NEUTROPHILS # BLD AUTO: 4.13 10*3/MM3 (ref 1.5–8.3)
NEUTROPHILS NFR BLD AUTO: 62 % (ref 41–71)
NITRITE UR QL STRIP: NEGATIVE
PH UR STRIP.AUTO: 7 [PH] (ref 5–8)
PLATELET # BLD AUTO: 187 10*3/MM3 (ref 150–450)
PMV BLD AUTO: 10.5 FL (ref 6–12)
POTASSIUM BLD-SCNC: 3.9 MMOL/L (ref 3.5–5.5)
PROT SERPL-MCNC: 7.6 G/DL (ref 5.7–8.2)
PROT UR QL STRIP: NEGATIVE
RBC # BLD AUTO: 4.57 10*6/MM3 (ref 4.2–5.76)
RBC # UR: ABNORMAL /HPF
REF LAB TEST METHOD: ABNORMAL
SODIUM BLD-SCNC: 138 MMOL/L (ref 132–146)
SP GR UR STRIP: 1.01 (ref 1–1.03)
SQUAMOUS #/AREA URNS HPF: ABNORMAL /HPF
UROBILINOGEN UR QL STRIP: ABNORMAL
WBC NRBC COR # BLD: 6.65 10*3/MM3 (ref 3.5–10.8)
WBC UR QL AUTO: ABNORMAL /HPF
WHOLE BLOOD HOLD SPECIMEN: NORMAL
WHOLE BLOOD HOLD SPECIMEN: NORMAL

## 2018-01-19 PROCEDURE — 96374 THER/PROPH/DIAG INJ IV PUSH: CPT

## 2018-01-19 PROCEDURE — 74176 CT ABD & PELVIS W/O CONTRAST: CPT

## 2018-01-19 PROCEDURE — 80053 COMPREHEN METABOLIC PANEL: CPT | Performed by: EMERGENCY MEDICINE

## 2018-01-19 PROCEDURE — 81001 URINALYSIS AUTO W/SCOPE: CPT | Performed by: EMERGENCY MEDICINE

## 2018-01-19 PROCEDURE — 99284 EMERGENCY DEPT VISIT MOD MDM: CPT

## 2018-01-19 PROCEDURE — 83690 ASSAY OF LIPASE: CPT | Performed by: EMERGENCY MEDICINE

## 2018-01-19 PROCEDURE — 25010000002 FENTANYL CITRATE (PF) 100 MCG/2ML SOLUTION: Performed by: EMERGENCY MEDICINE

## 2018-01-19 PROCEDURE — 85025 COMPLETE CBC W/AUTO DIFF WBC: CPT | Performed by: EMERGENCY MEDICINE

## 2018-01-19 PROCEDURE — 25010000002 ONDANSETRON PER 1 MG: Performed by: EMERGENCY MEDICINE

## 2018-01-19 PROCEDURE — 96375 TX/PRO/DX INJ NEW DRUG ADDON: CPT

## 2018-01-19 RX ORDER — FENTANYL CITRATE 50 UG/ML
100 INJECTION, SOLUTION INTRAMUSCULAR; INTRAVENOUS AS NEEDED
Status: DISCONTINUED | OUTPATIENT
Start: 2018-01-19 | End: 2018-01-19 | Stop reason: HOSPADM

## 2018-01-19 RX ORDER — ONDANSETRON 2 MG/ML
4 INJECTION INTRAMUSCULAR; INTRAVENOUS ONCE
Status: COMPLETED | OUTPATIENT
Start: 2018-01-19 | End: 2018-01-19

## 2018-01-19 RX ORDER — SODIUM CHLORIDE 0.9 % (FLUSH) 0.9 %
10 SYRINGE (ML) INJECTION AS NEEDED
Status: DISCONTINUED | OUTPATIENT
Start: 2018-01-19 | End: 2018-01-19 | Stop reason: HOSPADM

## 2018-01-19 RX ORDER — ONDANSETRON HYDROCHLORIDE 8 MG/1
8 TABLET, FILM COATED ORAL EVERY 8 HOURS PRN
Qty: 10 TABLET | Refills: 0 | Status: SHIPPED | OUTPATIENT
Start: 2018-01-19

## 2018-01-19 RX ADMIN — FENTANYL CITRATE 100 MCG: 50 INJECTION, SOLUTION INTRAMUSCULAR; INTRAVENOUS at 18:00

## 2018-01-19 RX ADMIN — ONDANSETRON 4 MG: 2 INJECTION INTRAMUSCULAR; INTRAVENOUS at 18:00

## 2018-01-19 NOTE — ED PROVIDER NOTES
Subjective   HPI Comments: Arthur Recinos is a 52 y.o.male who presents to the ED with a history of DDD, rectal cancer, umbilical hernia repair and exploratory laparotomy on 9/29/17 by Dr. Stapleton, General surgery, for a hernia mesh removal and umbilical hernia repair. He reports that he woke up to constant, stabbing suprapubic abdominal pain onset 5 days ago that has been progressively worsening. He reports that he has experienced constant watery diarrhea onset 5 days ago. He developed abdominal distension, nausea, decreased appetite onset 2 days ago. He states that his abdominal pain significantly worsens upon sitting up or bending over and rates his pain a 8/10 in severity. He also c/o dizziness upon standing, nausea, a vomiting episode this morning but denies chest pain, shortness of breath, urinary symptoms or any other complaints at this time. He reports that he has been constantly drinking fluids.       Patient is a 52 y.o. male presenting with abdominal pain.   History provided by:  Patient  Abdominal Pain   Pain location:  Suprapubic  Pain quality: stabbing    Pain radiates to:  Does not radiate  Pain severity:  Moderate  Onset quality:  Sudden  Duration:  5 days  Timing:  Constant  Progression:  Worsening  Chronicity:  New  Context: awakening from sleep, previous surgery and retching    Relieved by:  Nothing  Worsened by:  Position changes  Ineffective treatments: liquids.  Associated symptoms: diarrhea, nausea and vomiting    Associated symptoms: no chest pain, no dysuria and no shortness of breath    Risk factors: multiple surgeries        Review of Systems   Constitutional: Positive for appetite change (decreased).   Respiratory: Negative for shortness of breath.    Cardiovascular: Negative for chest pain.   Gastrointestinal: Positive for abdominal distention, abdominal pain, diarrhea, nausea and vomiting.   Genitourinary: Negative for decreased urine volume, difficulty urinating, dysuria and frequency.    Neurological: Positive for dizziness.   All other systems reviewed and are negative.      Past Medical History:   Diagnosis Date   • Degenerative disc disease, lumbar    • Rectal cancer        Allergies   Allergen Reactions   • Codeine Itching       Past Surgical History:   Procedure Laterality Date   • COLONOSCOPY  2016   • EXPLORATORY LAPAROTOMY N/A 9/29/2017    Procedure: LAPAROTOMY EXPLORATORY, LYSIS OF ADHESIONS, REMOVAL OF MESH PLACED DURING PREVIOUS PROCEDURE, UMBILICAL HERNIA REPAIR;  Surgeon: Saul Benjamin MD;  [facility];  Service:    • HEMORROIDECTOMY  2014   • LUMBAR DISC SURGERY  2006    L2-L5   • PILONIDAL CYSTECTOMY  2015   • UMBILICAL HERNIA REPAIR  2014   • UPPER GASTROINTESTINAL ENDOSCOPY  2016       Family History   Problem Relation Age of Onset   • Stroke Father    • Throat cancer Father        Social History     Social History   • Marital status: Single     Spouse name: N/A   • Number of children: 0   • Years of education: H.S.     Occupational History   • [email or web address]     Social History Main Topics   • Smoking status: Former Smoker     Packs/day: 1.00     Years: 20.00     Types: Cigarettes     Quit date: 6/25/2015   • Smokeless tobacco: Never Used   • Alcohol use No   • Drug use: No   • Sexual activity: Yes     Partners: Male     Other Topics Concern   • None     Social History Narrative         Objective   Physical Exam   Constitutional: He is oriented to person, place, and time. He appears well-developed and well-nourished. No distress.   HENT:   Head: Normocephalic and atraumatic.   Right Ear: External ear normal.   Left Ear: External ear normal.   Nose: Nose normal.   Mouth/Throat: Oropharynx is clear and moist.   Eyes: Conjunctivae are normal. No scleral icterus.   Neck: Normal range of motion. Neck supple. No JVD present. Carotid bruit is not present.   Cardiovascular: Normal rate, regular rhythm and normal heart sounds.    No murmur heard.  Pulmonary/Chest: Effort normal  and breath sounds normal. No respiratory distress. He has no rales.   Abdominal: Soft. He exhibits distension. He exhibits no mass. There is tenderness. There is no rebound and no guarding.   Hypoactive bowel sounds.   Abdomen is diffusely tender.   Lower abdomen is worse to palpation.  Increased tenderness to the periumbilical region.    Musculoskeletal: Normal range of motion. He exhibits no tenderness.   Lymphadenopathy:     He has no cervical adenopathy.   Neurological: He is alert and oriented to person, place, and time.   Skin: Skin is warm and dry. He is not diaphoretic.   Psychiatric: He has a normal mood and affect. His behavior is normal.   Nursing note and vitals reviewed.      Procedures         ED Course  ED Course     Recent Results (from the past 24 hour(s))   Comprehensive Metabolic Panel    Collection Time: 01/19/18  4:39 PM   Result Value Ref Range    Glucose 94 70 - 100 mg/dL    BUN 11 9 - 23 mg/dL    Creatinine 1.10 0.60 - 1.30 mg/dL    Sodium 138 132 - 146 mmol/L    Potassium 3.9 3.5 - 5.5 mmol/L    Chloride 104 99 - 109 mmol/L    CO2 28.0 20.0 - 31.0 mmol/L    Calcium 9.6 8.7 - 10.4 mg/dL    Total Protein 7.6 5.7 - 8.2 g/dL    Albumin 4.70 3.20 - 4.80 g/dL    ALT (SGPT) 14 7 - 40 U/L    AST (SGOT) 18 0 - 33 U/L    Alkaline Phosphatase 77 25 - 100 U/L    Total Bilirubin 0.4 0.3 - 1.2 mg/dL    eGFR Non African Amer 70 >60 mL/min/1.73    Globulin 2.9 gm/dL    A/G Ratio 1.6 1.5 - 2.5 g/dL    BUN/Creatinine Ratio 10.0 7.0 - 25.0    Anion Gap 6.0 3.0 - 11.0 mmol/L   Lipase    Collection Time: 01/19/18  4:39 PM   Result Value Ref Range    Lipase 53 (H) 6 - 51 U/L   Light Blue Top    Collection Time: 01/19/18  4:39 PM   Result Value Ref Range    Extra Tube hold for add-on    Green Top (Gel)    Collection Time: 01/19/18  4:39 PM   Result Value Ref Range    Extra Tube Hold for add-ons.    Lavender Top    Collection Time: 01/19/18  4:39 PM   Result Value Ref Range    Extra Tube hold for add-on    Gold Top  - SST    Collection Time: 01/19/18  4:39 PM   Result Value Ref Range    Extra Tube Hold for add-ons.    CBC Auto Differential    Collection Time: 01/19/18  4:39 PM   Result Value Ref Range    WBC 6.65 3.50 - 10.80 10*3/mm3    RBC 4.57 4.20 - 5.76 10*6/mm3    Hemoglobin 14.2 13.1 - 17.5 g/dL    Hematocrit 40.9 38.9 - 50.9 %    MCV 89.5 80.0 - 99.0 fL    MCH 31.1 (H) 27.0 - 31.0 pg    MCHC 34.7 32.0 - 36.0 g/dL    RDW 13.2 11.3 - 14.5 %    RDW-SD 43.0 37.0 - 54.0 fl    MPV 10.5 6.0 - 12.0 fL    Platelets 187 150 - 450 10*3/mm3    Neutrophil % 62.0 41.0 - 71.0 %    Lymphocyte % 25.9 24.0 - 44.0 %    Monocyte % 10.1 0.0 - 12.0 %    Eosinophil % 1.5 0.0 - 3.0 %    Basophil % 0.3 0.0 - 1.0 %    Immature Grans % 0.2 0.0 - 0.6 %    Neutrophils, Absolute 4.13 1.50 - 8.30 10*3/mm3    Lymphocytes, Absolute 1.72 0.60 - 4.80 10*3/mm3    Monocytes, Absolute 0.67 0.00 - 1.00 10*3/mm3    Eosinophils, Absolute 0.10 0.00 - 0.30 10*3/mm3    Basophils, Absolute 0.02 0.00 - 0.20 10*3/mm3    Immature Grans, Absolute 0.01 0.00 - 0.03 10*3/mm3   Urinalysis With / Culture If Indicated - Urine, Clean Catch    Collection Time: 01/19/18  4:51 PM   Result Value Ref Range    Color, UA Yellow Yellow, Straw    Appearance, UA Clear Clear    pH, UA 7.0 5.0 - 8.0    Specific Gravity, UA 1.010 1.005 - 1.030    Glucose, UA Negative Negative    Ketones, UA Negative Negative    Bilirubin, UA Negative Negative    Blood, UA Moderate (2+) (A) Negative    Protein, UA Negative Negative    Leuk Esterase, UA Negative Negative    Nitrite, UA Negative Negative    Urobilinogen, UA 0.2 E.U./dL 0.2 - 1.0 E.U./dL   Urinalysis, Microscopic Only - Urine, Clean Catch    Collection Time: 01/19/18  4:51 PM   Result Value Ref Range    RBC, UA 0-2 None Seen, 0-2 /HPF    WBC, UA 0-2 (A) None Seen /HPF    Bacteria, UA None Seen None Seen, Trace /HPF    Squamous Epithelial Cells, UA None Seen None Seen, 0-2 /HPF    Hyaline Casts, UA None Seen 0 - 6 /LPF    Methodology Manual  "Light Microscopy      Note: In addition to lab results from this visit, the labs listed above may include labs taken at another facility or during a different encounter within the last 24 hours. Please correlate lab times with ED admission and discharge times for further clarification of the services performed during this visit.    CT Abdomen Pelvis Without Contrast   Preliminary Result   No acute intra-abdominal abnormality. Specifically no   evidence for loculated fluid collection or mechanical obstructive   process.       DICTATED:     01/19/2018   EDITED    :     01/19/2018             Vitals:    01/19/18 1633 01/19/18 1730 01/19/18 1856 01/19/18 1905   BP: 142/80 111/84 109/80    BP Location: Left arm  Right arm    Patient Position: Sitting  Sitting    Pulse: 72  54    Resp: 16  16    Temp: 97.6 °F (36.4 °C)      TempSrc: Oral      SpO2: 99% 98%  98%   Weight: 76.2 kg (168 lb)      Height: 175.3 cm (69\")        Medications   sodium chloride 0.9 % flush 10 mL (not administered)   fentaNYL citrate (PF) (SUBLIMAZE) injection 100 mcg (100 mcg Intravenous Given 1/19/18 1800)   ondansetron (ZOFRAN) injection 4 mg (4 mg Intravenous Given 1/19/18 1800)     ECG/EMG Results (last 24 hours)     ** No results found for the last 24 hours. **                          [facility]    Final diagnoses:   Generalized abdominal pain   Gastroenteritis       Documentation assistance provided by rashmi Shepherd.  Information recorded by the scribe was done at my direction and has been verified and validated by me.     Michi Shepherd  01/19/18 1966       Peewee Mccarty MD  01/19/18 2102    "

## 2018-01-19 NOTE — DISCHARGE INSTRUCTIONS
Try drinking a couple bottles of Kefir daily as a probiotic.  Return to the ER if you develop any worsening pain, have fever, blood with BMs, or any other worrisome symptom.

## 2018-07-31 ENCOUNTER — HOSPITAL ENCOUNTER (EMERGENCY)
Facility: HOSPITAL | Age: 53
Discharge: HOME | End: 2018-07-31
Attending: EMERGENCY MEDICINE | Admitting: EMERGENCY MEDICINE

## 2018-07-31 ENCOUNTER — APPOINTMENT (OUTPATIENT)
Dept: GENERAL RADIOLOGY | Facility: HOSPITAL | Age: 53
End: 2018-07-31

## 2018-07-31 ENCOUNTER — APPOINTMENT (OUTPATIENT)
Dept: CT IMAGING | Facility: HOSPITAL | Age: 53
End: 2018-07-31

## 2018-07-31 VITALS
HEIGHT: 69 IN | SYSTOLIC BLOOD PRESSURE: 116 MMHG | DIASTOLIC BLOOD PRESSURE: 81 MMHG | TEMPERATURE: 97.8 F | HEART RATE: 57 BPM | RESPIRATION RATE: 14 BRPM | OXYGEN SATURATION: 95 % | WEIGHT: 170 LBS | BODY MASS INDEX: 25.18 KG/M2

## 2018-07-31 DIAGNOSIS — R05.9 COUGH IN ADULT PATIENT: ICD-10-CM

## 2018-07-31 DIAGNOSIS — R07.89 ATYPICAL CHEST PAIN: Primary | ICD-10-CM

## 2018-07-31 LAB
ALBUMIN SERPL-MCNC: 4.77 G/DL (ref 3.2–4.8)
ALBUMIN/GLOB SERPL: 1.8 G/DL (ref 1.5–2.5)
ALP SERPL-CCNC: 79 U/L (ref 25–100)
ALT SERPL W P-5'-P-CCNC: 17 U/L (ref 7–40)
ANION GAP SERPL CALCULATED.3IONS-SCNC: 7 MMOL/L (ref 3–11)
AST SERPL-CCNC: 15 U/L (ref 0–33)
BASOPHILS # BLD AUTO: 0.01 10*3/MM3 (ref 0–0.2)
BASOPHILS NFR BLD AUTO: 0.1 % (ref 0–1)
BILIRUB SERPL-MCNC: 0.4 MG/DL (ref 0.3–1.2)
BNP SERPL-MCNC: 28 PG/ML (ref 0–100)
BUN BLD-MCNC: 22 MG/DL (ref 9–23)
BUN/CREAT SERPL: 18.2 (ref 7–25)
CALCIUM SPEC-SCNC: 9.4 MG/DL (ref 8.7–10.4)
CHLORIDE SERPL-SCNC: 109 MMOL/L (ref 99–109)
CO2 SERPL-SCNC: 26 MMOL/L (ref 20–31)
CREAT BLD-MCNC: 1.21 MG/DL (ref 0.6–1.3)
DEPRECATED RDW RBC AUTO: 42.8 FL (ref 37–54)
EOSINOPHIL # BLD AUTO: 0 10*3/MM3 (ref 0–0.3)
EOSINOPHIL NFR BLD AUTO: 0 % (ref 0–3)
ERYTHROCYTE [DISTWIDTH] IN BLOOD BY AUTOMATED COUNT: 13.3 % (ref 11.3–14.5)
GFR SERPL CREATININE-BSD FRML MDRD: 63 ML/MIN/1.73
GLOBULIN UR ELPH-MCNC: 2.6 GM/DL
GLUCOSE BLD-MCNC: 104 MG/DL (ref 70–100)
HCT VFR BLD AUTO: 36.8 % (ref 38.9–50.9)
HGB BLD-MCNC: 12.9 G/DL (ref 13.1–17.5)
HOLD SPECIMEN: NORMAL
HOLD SPECIMEN: NORMAL
IMM GRANULOCYTES # BLD: 0.04 10*3/MM3 (ref 0–0.03)
IMM GRANULOCYTES NFR BLD: 0.4 % (ref 0–0.6)
LIPASE SERPL-CCNC: 175 U/L (ref 6–51)
LYMPHOCYTES # BLD AUTO: 1.24 10*3/MM3 (ref 0.6–4.8)
LYMPHOCYTES NFR BLD AUTO: 12.8 % (ref 24–44)
MCH RBC QN AUTO: 30.9 PG (ref 27–31)
MCHC RBC AUTO-ENTMCNC: 35.1 G/DL (ref 32–36)
MCV RBC AUTO: 88 FL (ref 80–99)
MONOCYTES # BLD AUTO: 0.55 10*3/MM3 (ref 0–1)
MONOCYTES NFR BLD AUTO: 5.7 % (ref 0–12)
NEUTROPHILS # BLD AUTO: 7.81 10*3/MM3 (ref 1.5–8.3)
NEUTROPHILS NFR BLD AUTO: 81 % (ref 41–71)
PLATELET # BLD AUTO: 172 10*3/MM3 (ref 150–450)
PMV BLD AUTO: 10.2 FL (ref 6–12)
POTASSIUM BLD-SCNC: 4 MMOL/L (ref 3.5–5.5)
PROT SERPL-MCNC: 7.4 G/DL (ref 5.7–8.2)
RBC # BLD AUTO: 4.18 10*6/MM3 (ref 4.2–5.76)
SODIUM BLD-SCNC: 142 MMOL/L (ref 132–146)
TROPONIN I SERPL-MCNC: 0 NG/ML (ref 0–0.07)
TROPONIN I SERPL-MCNC: 0 NG/ML (ref 0–0.07)
WBC NRBC COR # BLD: 9.65 10*3/MM3 (ref 3.5–10.8)
WHOLE BLOOD HOLD SPECIMEN: NORMAL
WHOLE BLOOD HOLD SPECIMEN: NORMAL

## 2018-07-31 PROCEDURE — 99284 EMERGENCY DEPT VISIT MOD MDM: CPT

## 2018-07-31 PROCEDURE — 96374 THER/PROPH/DIAG INJ IV PUSH: CPT

## 2018-07-31 PROCEDURE — 85025 COMPLETE CBC W/AUTO DIFF WBC: CPT

## 2018-07-31 PROCEDURE — 83690 ASSAY OF LIPASE: CPT

## 2018-07-31 PROCEDURE — 80053 COMPREHEN METABOLIC PANEL: CPT

## 2018-07-31 PROCEDURE — 84484 ASSAY OF TROPONIN QUANT: CPT

## 2018-07-31 PROCEDURE — 71046 X-RAY EXAM CHEST 2 VIEWS: CPT

## 2018-07-31 PROCEDURE — 25010000002 HYDROMORPHONE PER 4 MG: Performed by: EMERGENCY MEDICINE

## 2018-07-31 PROCEDURE — 71275 CT ANGIOGRAPHY CHEST: CPT

## 2018-07-31 PROCEDURE — 0 IOPAMIDOL PER 1 ML: Performed by: EMERGENCY MEDICINE

## 2018-07-31 PROCEDURE — 93005 ELECTROCARDIOGRAM TRACING: CPT

## 2018-07-31 PROCEDURE — 93005 ELECTROCARDIOGRAM TRACING: CPT | Performed by: EMERGENCY MEDICINE

## 2018-07-31 PROCEDURE — 83880 ASSAY OF NATRIURETIC PEPTIDE: CPT

## 2018-07-31 RX ORDER — ASPIRIN 81 MG/1
324 TABLET, CHEWABLE ORAL ONCE
Status: COMPLETED | OUTPATIENT
Start: 2018-07-31 | End: 2018-07-31

## 2018-07-31 RX ORDER — DOXYCYCLINE HYCLATE 100 MG/1
100 CAPSULE ORAL 2 TIMES DAILY
Status: ON HOLD | COMMUNITY
End: 2021-08-24

## 2018-07-31 RX ORDER — PREDNISONE 1 MG/1
10 TABLET ORAL DAILY
Status: ON HOLD | COMMUNITY
End: 2021-08-24

## 2018-07-31 RX ORDER — SODIUM CHLORIDE 0.9 % (FLUSH) 0.9 %
10 SYRINGE (ML) INJECTION AS NEEDED
Status: DISCONTINUED | OUTPATIENT
Start: 2018-07-31 | End: 2018-07-31 | Stop reason: HOSPADM

## 2018-07-31 RX ORDER — RANITIDINE HCL 75 MG
75 TABLET ORAL 2 TIMES DAILY
Status: ON HOLD | COMMUNITY
End: 2021-08-24

## 2018-07-31 RX ORDER — HYDROMORPHONE HYDROCHLORIDE 1 MG/ML
0.5 INJECTION, SOLUTION INTRAMUSCULAR; INTRAVENOUS; SUBCUTANEOUS ONCE
Status: COMPLETED | OUTPATIENT
Start: 2018-07-31 | End: 2018-07-31

## 2018-07-31 RX ORDER — ALBUTEROL SULFATE 90 UG/1
2 AEROSOL, METERED RESPIRATORY (INHALATION) EVERY 4 HOURS PRN
Status: ON HOLD | COMMUNITY
End: 2021-08-24

## 2018-07-31 RX ADMIN — HYDROMORPHONE HYDROCHLORIDE 0.5 MG: 1 INJECTION, SOLUTION INTRAMUSCULAR; INTRAVENOUS; SUBCUTANEOUS at 20:09

## 2018-07-31 RX ADMIN — IOPAMIDOL 100 ML: 755 INJECTION, SOLUTION INTRAVENOUS at 18:59

## 2018-07-31 RX ADMIN — ASPIRIN 81 MG 324 MG: 81 TABLET ORAL at 17:16

## 2018-08-21 ENCOUNTER — OFFICE VISIT (OUTPATIENT)
Dept: ONCOLOGY | Facility: CLINIC | Age: 53
End: 2018-08-21

## 2018-08-21 ENCOUNTER — LAB (OUTPATIENT)
Dept: LAB | Facility: HOSPITAL | Age: 53
End: 2018-08-21

## 2018-08-21 VITALS
SYSTOLIC BLOOD PRESSURE: 138 MMHG | HEART RATE: 71 BPM | BODY MASS INDEX: 28.88 KG/M2 | WEIGHT: 195 LBS | RESPIRATION RATE: 16 BRPM | HEIGHT: 69 IN | TEMPERATURE: 97.3 F | DIASTOLIC BLOOD PRESSURE: 94 MMHG | OXYGEN SATURATION: 99 %

## 2018-08-21 DIAGNOSIS — Z87.39 HISTORY OF DEGENERATIVE DISC DISEASE: ICD-10-CM

## 2018-08-21 DIAGNOSIS — Z85.048 HISTORY OF ANAL CANCER: Primary | ICD-10-CM

## 2018-08-21 DIAGNOSIS — Z87.39 HISTORY OF DEGENERATIVE DISC DISEASE: Primary | ICD-10-CM

## 2018-08-21 LAB
ALBUMIN SERPL-MCNC: 4.34 G/DL (ref 3.2–4.8)
ALBUMIN/GLOB SERPL: 2 G/DL (ref 1.5–2.5)
ALP SERPL-CCNC: 77 U/L (ref 25–100)
ALT SERPL W P-5'-P-CCNC: 17 U/L (ref 7–40)
ANION GAP SERPL CALCULATED.3IONS-SCNC: 8 MMOL/L (ref 3–11)
AST SERPL-CCNC: 21 U/L (ref 0–33)
BILIRUB SERPL-MCNC: 0.3 MG/DL (ref 0.3–1.2)
BUN BLD-MCNC: 22 MG/DL (ref 9–23)
BUN/CREAT SERPL: 16.4 (ref 7–25)
CALCIUM SPEC-SCNC: 9.2 MG/DL (ref 8.7–10.4)
CHLORIDE SERPL-SCNC: 106 MMOL/L (ref 99–109)
CO2 SERPL-SCNC: 27 MMOL/L (ref 20–31)
CREAT BLD-MCNC: 1.34 MG/DL (ref 0.6–1.3)
ERYTHROCYTE [DISTWIDTH] IN BLOOD BY AUTOMATED COUNT: 13.6 % (ref 11.3–14.5)
GFR SERPL CREATININE-BSD FRML MDRD: 56 ML/MIN/1.73
GLOBULIN UR ELPH-MCNC: 2.2 GM/DL
GLUCOSE BLD-MCNC: 88 MG/DL (ref 70–100)
HCT VFR BLD AUTO: 36.3 % (ref 38.9–50.9)
HGB BLD-MCNC: 12.4 G/DL (ref 13.1–17.5)
LYMPHOCYTES # BLD AUTO: 1.6 10*3/MM3 (ref 0.6–4.8)
LYMPHOCYTES NFR BLD AUTO: 27.9 % (ref 24–44)
MCH RBC QN AUTO: 31.1 PG (ref 27–31)
MCHC RBC AUTO-ENTMCNC: 34.2 G/DL (ref 32–36)
MCV RBC AUTO: 91.1 FL (ref 80–99)
MONOCYTES # BLD AUTO: 0.5 10*3/MM3 (ref 0–1)
MONOCYTES NFR BLD AUTO: 7.8 % (ref 0–12)
NEUTROPHILS # BLD AUTO: 3.7 10*3/MM3 (ref 1.5–8.3)
NEUTROPHILS NFR BLD AUTO: 64.3 % (ref 41–71)
PLATELET # BLD AUTO: 194 10*3/MM3 (ref 150–450)
PMV BLD AUTO: 7.5 FL (ref 6–12)
POTASSIUM BLD-SCNC: 4.2 MMOL/L (ref 3.5–5.5)
PROT SERPL-MCNC: 6.5 G/DL (ref 5.7–8.2)
RBC # BLD AUTO: 3.98 10*6/MM3 (ref 4.2–5.76)
SODIUM BLD-SCNC: 141 MMOL/L (ref 132–146)
WBC NRBC COR # BLD: 5.8 10*3/MM3 (ref 3.5–10.8)

## 2018-08-21 PROCEDURE — 80053 COMPREHEN METABOLIC PANEL: CPT

## 2018-08-21 PROCEDURE — 99213 OFFICE O/P EST LOW 20 MIN: CPT | Performed by: INTERNAL MEDICINE

## 2018-08-21 PROCEDURE — 85025 COMPLETE CBC W/AUTO DIFF WBC: CPT

## 2018-08-21 PROCEDURE — 36415 COLL VENOUS BLD VENIPUNCTURE: CPT

## 2018-08-21 NOTE — PROGRESS NOTES
PROBLEM LIST:  1. Squamous cell carcinoma of the anus:   a) The patient presented with 2 days of severe anal pain. He was found to have  a thrombosed hemorrhoid which was excised. At the time of followup he continued  to have a tender area as well as an ulcerated area and he had an exam under  anesthesia on 12/15/2014. A biopsy at that time showed a poorly differentiated  squamous cell carcinoma.   b) PET/CT on 01/02/2015 showed hypermetabolic mass at the anal verge but was  otherwise negative.   c) Chemoradiation with 5-FU and mitomycin was started on 01/08/2015.  Chemoradiation was completed on 02/13/2015.   2. Degenerative disc disease status post spine surgery.     Subjective     HISTORY OF PRESENT ILLNESS:   Arthur Recinos returns for follow-up.  At the end of July he went to the ED with pleuritic chest pain.  A CTA showed no evidence of PE or other acute findings.  He still is having intermittent left sided chest pain that is worse with inspiration and cough.  He has been on 2 courses of prednisone and antibiotics without improvement.  He also reports that he required abdominal surgery to remove the mesh that was embedded in his bowel.  Since then he has continued to have some abdominal pain but says he's doing okay.  He has gained some weight from being on prednisone.  He has had occasional rectal bleeding and was told by Dr. Benjamin that he has a fissure.  He is using ointment for this.    Past Medical History, Past Surgical History, Social History, Family History have been reviewed and are without significant changes except as mentioned.    Review of Systems   A comprehensive 14 point review of systems was performed and was negative except as mentioned.    Medications:  The current medication list was reviewed in the EMR    ALLERGIES:    Allergies   Allergen Reactions   • Codeine Itching       Objective      /94 Comment: RUE  Pulse 71   Temp 97.3 °F (36.3 °C) (Temporal Artery )   Resp 16    "Ht 175.3 cm (69\")   Wt 88.5 kg (195 lb)   SpO2 99% Comment: RA  BMI 28.80 kg/m²      Performance Status: 1    General: well appearing, in no acute distress  HEENT: sclera anicteric, oropharynx clear  Lymphatics: no cervical, supraclavicular, or axillary adenopathy  Cardiovascular: regular rate and rhythm, no murmurs  Lungs: clear to auscultation bilaterally  Abdomen: soft, nontender, nondistended.  No palpable organomegaly  Extremeties: no lower extremity edema  Skin: no rashes, lesions, bruising, or petechiae    RECENT LABS:  CBC shows a hemoglobin of 12, otherwise unremarkable.          Assessment/Plan   Arthur Recinos is a 53 y.o. year old male with a history of stage II anal squamous cell carcinoma who completed chemoradiotherapy with a complete response.  He is having some issues that may be related to his prior chemoradiation treatment including anal fissure with intermittent bleeding.  However he has no evidence of recurrent disease.  We reviewed his recent CAT scan results.  The reason for his intermittent pleuritic chest pain is unclear.  This could be costochondritis versus pleurisy.  He mentioned planning to see a lung specialist if it does not improve and I think this is reasonable, but hopefully it will resolve on its own.  I did recommend that he continue follow-up every 6 months until it is been 5 years from his diagnosis.  I will plan to see him back in 6 months with labs.                  Visit time was 15 minutes, greater than 50% spent in counseling      Elizabeth Schulz MD  [facility]    8/21/2018          CC:          "

## 2021-08-20 ENCOUNTER — TRANSCRIBE ORDERS (OUTPATIENT)
Dept: ADMINISTRATIVE | Facility: HOSPITAL | Age: 56
End: 2021-08-20

## 2021-08-20 DIAGNOSIS — R94.39 ABNORMAL STRESS TEST: Primary | ICD-10-CM

## 2021-08-24 ENCOUNTER — HOSPITAL ENCOUNTER (OUTPATIENT)
Facility: HOSPITAL | Age: 56
Setting detail: HOSPITAL OUTPATIENT SURGERY
Discharge: HOME | End: 2021-08-24
Attending: INTERNAL MEDICINE | Admitting: INTERNAL MEDICINE

## 2021-08-24 VITALS
BODY MASS INDEX: 30.34 KG/M2 | WEIGHT: 200.2 LBS | RESPIRATION RATE: 18 BRPM | DIASTOLIC BLOOD PRESSURE: 76 MMHG | SYSTOLIC BLOOD PRESSURE: 113 MMHG | HEART RATE: 50 BPM | TEMPERATURE: 97.1 F | HEIGHT: 68 IN | OXYGEN SATURATION: 96 %

## 2021-08-24 DIAGNOSIS — R94.39 ABNORMAL STRESS TEST: ICD-10-CM

## 2021-08-24 LAB
BASE EXCESS BLDA CALC-SCNC: 2 MMOL/L (ref -5–5)
CA-I BLDA-SCNC: 1.23 MMOL/L (ref 1.2–1.32)
CO2 BLDA-SCNC: 27 MMOL/L (ref 24–29)
CREAT BLDA-MCNC: 1.2 MG/DL (ref 0.6–1.3)
GLUCOSE BLDC GLUCOMTR-MCNC: 112 MG/DL (ref 70–130)
HCO3 BLDA-SCNC: 26 MMOL/L (ref 22–26)
HCT VFR BLDA CALC: 33 % (ref 38–51)
HGB BLDA-MCNC: 11.2 G/DL (ref 12–17)
PCO2 BLDA: 39.7 MM HG (ref 35–45)
PH BLDA: 7.42 PH UNITS (ref 7.35–7.6)
PO2 BLDA: 48 MMHG (ref 80–105)
POTASSIUM BLDA-SCNC: 3.6 MMOL/L (ref 3.5–4.9)
SAO2 % BLDA: 84 % (ref 95–98)
SODIUM BLD-SCNC: 140 MMOL/L (ref 138–146)

## 2021-08-24 PROCEDURE — 82565 ASSAY OF CREATININE: CPT

## 2021-08-24 PROCEDURE — 84132 ASSAY OF SERUM POTASSIUM: CPT

## 2021-08-24 PROCEDURE — 25010000002 FENTANYL CITRATE (PF) 50 MCG/ML SOLUTION: Performed by: INTERNAL MEDICINE

## 2021-08-24 PROCEDURE — 82947 ASSAY GLUCOSE BLOOD QUANT: CPT

## 2021-08-24 PROCEDURE — 93458 L HRT ARTERY/VENTRICLE ANGIO: CPT | Performed by: INTERNAL MEDICINE

## 2021-08-24 PROCEDURE — 82330 ASSAY OF CALCIUM: CPT

## 2021-08-24 PROCEDURE — 82803 BLOOD GASES ANY COMBINATION: CPT

## 2021-08-24 PROCEDURE — 85014 HEMATOCRIT: CPT

## 2021-08-24 PROCEDURE — C1760 CLOSURE DEV, VASC: HCPCS | Performed by: INTERNAL MEDICINE

## 2021-08-24 PROCEDURE — C1769 GUIDE WIRE: HCPCS | Performed by: INTERNAL MEDICINE

## 2021-08-24 PROCEDURE — 25010000002 MIDAZOLAM PER 1 MG: Performed by: INTERNAL MEDICINE

## 2021-08-24 PROCEDURE — 0 IOPAMIDOL PER 1 ML: Performed by: INTERNAL MEDICINE

## 2021-08-24 PROCEDURE — C1894 INTRO/SHEATH, NON-LASER: HCPCS | Performed by: INTERNAL MEDICINE

## 2021-08-24 PROCEDURE — 84295 ASSAY OF SERUM SODIUM: CPT

## 2021-08-24 RX ORDER — MIDAZOLAM HYDROCHLORIDE 1 MG/ML
INJECTION INTRAMUSCULAR; INTRAVENOUS AS NEEDED
Status: DISCONTINUED | OUTPATIENT
Start: 2021-08-24 | End: 2021-08-24 | Stop reason: HOSPADM

## 2021-08-24 RX ORDER — FENTANYL CITRATE 50 UG/ML
INJECTION, SOLUTION INTRAMUSCULAR; INTRAVENOUS AS NEEDED
Status: DISCONTINUED | OUTPATIENT
Start: 2021-08-24 | End: 2021-08-24 | Stop reason: HOSPADM

## 2021-08-24 RX ORDER — IRBESARTAN 75 MG/1
75 TABLET ORAL NIGHTLY
COMMUNITY

## 2021-08-24 RX ORDER — POTASSIUM CHLORIDE 750 MG/1
40 CAPSULE, EXTENDED RELEASE ORAL AS NEEDED
Status: DISCONTINUED | OUTPATIENT
Start: 2021-08-24 | End: 2021-08-24 | Stop reason: HOSPADM

## 2021-08-24 RX ORDER — SODIUM CHLORIDE 0.9 % (FLUSH) 0.9 %
10 SYRINGE (ML) INJECTION AS NEEDED
Status: DISCONTINUED | OUTPATIENT
Start: 2021-08-24 | End: 2021-08-24 | Stop reason: HOSPADM

## 2021-08-24 RX ORDER — PROMETHAZINE HYDROCHLORIDE 12.5 MG/1
12.5 TABLET ORAL EVERY 6 HOURS PRN
COMMUNITY

## 2021-08-24 RX ORDER — ASPIRIN 325 MG
325 TABLET, DELAYED RELEASE (ENTERIC COATED) ORAL DAILY
Status: DISCONTINUED | OUTPATIENT
Start: 2021-08-24 | End: 2021-08-24 | Stop reason: HOSPADM

## 2021-08-24 RX ORDER — ROSUVASTATIN CALCIUM 10 MG/1
10 TABLET, COATED ORAL DAILY
COMMUNITY

## 2021-08-24 RX ORDER — BUSPIRONE HYDROCHLORIDE 10 MG/1
10 TABLET ORAL 3 TIMES DAILY
COMMUNITY

## 2021-08-24 RX ORDER — HYDROCHLOROTHIAZIDE 12.5 MG/1
12.5 TABLET ORAL DAILY
COMMUNITY

## 2021-08-24 RX ORDER — ACETAMINOPHEN 325 MG/1
650 TABLET ORAL EVERY 4 HOURS PRN
Status: DISCONTINUED | OUTPATIENT
Start: 2021-08-24 | End: 2021-08-24 | Stop reason: HOSPADM

## 2021-08-24 RX ORDER — TAMSULOSIN HYDROCHLORIDE 0.4 MG/1
1 CAPSULE ORAL DAILY
COMMUNITY

## 2021-08-24 RX ORDER — CELECOXIB 200 MG/1
200 CAPSULE ORAL DAILY
COMMUNITY

## 2021-08-24 RX ORDER — SODIUM CHLORIDE 0.9 % (FLUSH) 0.9 %
3 SYRINGE (ML) INJECTION EVERY 12 HOURS SCHEDULED
Status: DISCONTINUED | OUTPATIENT
Start: 2021-08-24 | End: 2021-08-24 | Stop reason: HOSPADM

## 2021-08-24 RX ORDER — OMEPRAZOLE 20 MG/1
20 CAPSULE, DELAYED RELEASE ORAL DAILY
COMMUNITY

## 2021-08-24 RX ORDER — LIDOCAINE HYDROCHLORIDE 10 MG/ML
INJECTION, SOLUTION EPIDURAL; INFILTRATION; INTRACAUDAL; PERINEURAL AS NEEDED
Status: DISCONTINUED | OUTPATIENT
Start: 2021-08-24 | End: 2021-08-24 | Stop reason: HOSPADM

## 2021-08-24 RX ORDER — TRAZODONE HYDROCHLORIDE 100 MG/1
100 TABLET ORAL NIGHTLY
COMMUNITY

## 2021-08-24 RX ADMIN — POTASSIUM CHLORIDE 40 MEQ: 750 CAPSULE, EXTENDED RELEASE ORAL at 07:27

## 2021-08-24 RX ADMIN — ASPIRIN 325 MG: 325 TABLET, COATED ORAL at 07:27

## 2021-08-24 NOTE — H&P
Pre-Cardiac Catheterization Report  Cardiovascular Laboratory  [facility]      Patient:  Arthur Recinos  :  1965  PCP:  Simran Lawler APRN  PHONE:  [phone]    DATE: 2021    BRIEF HPI:  Arthur Recinos is a 56 y.o. male with a history of hypertension, hypercholesterolemia, and rectal cancer.  He is complaining of a several month history of palpitations.  He states it is moderate in severity lasting minutes with associated chest pain which he describes as a tightness.  He also has associated shortness of breath and dyspnea on exertion.  He was recently evaluated in clinic and underwent an abnormal stress test.  He now presents for left heart catheterization with possible intervention.    Cardiac Risk Factors:  advanced age (older than 55 for men, 65 for women), dyslipidemia, hypertension, male gender, obesity (BMI >= 30 kg/m2)    Anginal class in last 2 weeks:  CCS class II    CHF Class in last 2 weeks:  NYHA Class II    Cardiogenic shock:  no    Cardiac arrest <24 hours:  no    Stress test within last 6 months:   yes   Details:    Previous cardiac catheterization:  yes  Details:     Previous CABG:  no  Details:      Allergies:     IV contrast allergy:  no  Allergies   Allergen Reactions   • Codeine Itching       MEDICATIONS:  Prior to Admission medications    Medication Sig Start Date End Date Taking? Authorizing Provider   busPIRone (BUSPAR) 10 MG tablet Take 10 mg by mouth 3 (Three) Times a Day.   Yes ProviderKelsy MD   celecoxib (CeleBREX) 200 MG capsule Take 200 mg by mouth Daily.   Yes ProviderKelsy MD   hydroCHLOROthiazide (HYDRODIURIL) 12.5 MG tablet Take 12.5 mg by mouth Daily.   Yes Kelsy Woodard MD   irbesartan (AVAPRO) 75 MG tablet Take 75 mg by mouth Every Night.   Yes Kelsy Woodard MD   omeprazole (priLOSEC) 20 MG capsule Take 20 mg by mouth Daily.   Yes Kelsy Woodard MD   ondansetron (ZOFRAN) 8 MG tablet Take 1 tablet by mouth  Every 8 (Eight) Hours As Needed for Nausea or Vomiting. 1/19/18  Yes Peewee Mccarty MD   promethazine (PHENERGAN) 12.5 MG tablet Take 12.5 mg by mouth Every 6 (Six) Hours As Needed for Nausea or Vomiting.   Yes Kelsy Woodard MD   rosuvastatin (CRESTOR) 10 MG tablet Take 10 mg by mouth Daily.   Yes Kelsy Woodard MD   tamsulosin (FLOMAX) 0.4 MG capsule 24 hr capsule Take 1 capsule by mouth Daily.   Yes Kelsy Woodard MD   traZODone (DESYREL) 100 MG tablet Take 100 mg by mouth Every Night.   Yes Kelsy Woodard MD   doxycycline (VIBRAMYCIN) 100 MG capsule Take 100 mg by mouth 2 (Two) Times a Day.  8/24/21 Yes Kelsy Woodard MD   albuterol (PROVENTIL HFA;VENTOLIN HFA) 108 (90 Base) MCG/ACT inhaler Inhale 2 puffs Every 4 (Four) Hours As Needed for Wheezing.  8/24/21  Kelsy Woodard MD   HYDROcodone-acetaminophen (NORCO) 7.5-325 MG per tablet Take 1 tablet by mouth Every 6 (Six) Hours As Needed for Moderate Pain . 10/9/17 8/24/21  Saul Benjamin MD   predniSONE (DELTASONE) 5 MG tablet Take 10 mg by mouth Daily.  8/24/21  Kelsy Woodard MD   raNITIdine (ZANTAC) 75 MG tablet Take 75 mg by mouth 2 (Two) Times a Day.  8/24/21  Kelsy Woodard MD       Past medical & surgical history, social and family history reviewed in the electronic medical record.    ROS:  Cardiovascular ROS: positive for - chest pain, dyspnea on exertion, palpitations, rapid heart rate and shortness of breath    Physical Exam:    Vitals:   Vitals:    08/24/21 0656   BP: 113/79   Pulse: 59   Resp: 16   Temp: 97.1 °F (36.2 °C)   SpO2: 98%          08/24/21  0656   Weight: 90.8 kg (200 lb 3.2 oz)       General Appearance:    Alert, cooperative, in no acute distress   Head:    Normocephalic, without obvious abnormality, atraumatic   Eyes:            Lids and lashes normal, conjunctivae and sclerae normal, no   icterus, no pallor, corneas clear, PERRLA   Ears:    Ears appear intact with no  abnormalities noted   Neck:   No adenopathy, supple, trachea midline, no thyromegaly, no   carotid bruit, no JVD   Back:     No kyphosis present, no scoliosis present, range of motion normal   Lungs:     Clear to auscultation,respirations regular, even and                unlabored    Heart:    Regular rhythm and normal rate, normal S1 and S2, no         murmur, no gallop, no rub, no click   Chest Wall:    No abnormalities observed   Abdomen:     Normal bowel sounds, no masses, no organomegaly, soft     nontender, nondistended, no guarding, no rebound                tenderness   Rectal:     Deferred   Extremities:   Moves all extremities well, no edema, no cyanosis, no           redness   Pulses:   Pulses palpable and equal bilaterally   Skin:   No bleeding, bruising or rash   Neurologic:   Cranial nerves 2 - 12 grossly intact, sensation intact     Barbaeu Test:  Left: Normal  (oxymetric Allens) Right: Not Assessed     Results from last 7 days   Lab Units 08/24/21  0640   CREATININE mg/dL 1.20     Results from last 7 days   Lab Units 08/24/21  0640   HEMOGLOBIN, POC g/dL 11.2*   HEMATOCRIT POC % 33*     Lab Results   Component Value Date    CHLPL 218 (H) 04/16/2014    TRIG 145 04/16/2014    HDL 41 04/16/2014    AST 21 08/21/2018    ALT 17 08/21/2018           Impression      · Abnormal stress test    Plan     · Procedure to perform: [facility]  · Planned access: Per JOSEPH Samano   08/24/21  07:17 EDT

## 2022-09-28 ENCOUNTER — TRANSCRIBE ORDERS (OUTPATIENT)
Dept: ADMINISTRATIVE | Facility: HOSPITAL | Age: 57
End: 2022-09-28

## 2022-09-28 ENCOUNTER — HOSPITAL ENCOUNTER (OUTPATIENT)
Dept: GENERAL RADIOLOGY | Facility: HOSPITAL | Age: 57
Discharge: HOME | End: 2022-09-28
Admitting: FAMILY MEDICINE

## 2022-09-28 DIAGNOSIS — R06.02 SHORTNESS OF BREATH ON EXERTION: ICD-10-CM

## 2022-09-28 DIAGNOSIS — R06.02 SHORTNESS OF BREATH ON EXERTION: Primary | ICD-10-CM

## 2022-09-28 PROCEDURE — 71046 X-RAY EXAM CHEST 2 VIEWS: CPT

## 2022-11-15 ENCOUNTER — LAB REQUISITION (OUTPATIENT)
Dept: LAB | Facility: HOSPITAL | Age: 57
End: 2022-11-15

## 2022-11-15 DIAGNOSIS — C21.1 MALIGNANT NEOPLASM OF ANAL CANAL: ICD-10-CM

## 2022-11-28 LAB
LAB AP CASE REPORT: NORMAL
LAB AP CLINICAL INFORMATION: NORMAL
PATH REPORT.FINAL DX SPEC: NORMAL

## 2025-03-19 ENCOUNTER — TRANSCRIBE ORDERS (OUTPATIENT)
Dept: ADMINISTRATIVE | Facility: HOSPITAL | Age: 60
End: 2025-03-19
Payer: COMMERCIAL

## 2025-03-19 DIAGNOSIS — K91.1 DUMPING SYNDROME: Primary | ICD-10-CM

## 2025-03-25 ENCOUNTER — HOSPITAL ENCOUNTER (OUTPATIENT)
Dept: INFUSION THERAPY | Facility: HOSPITAL | Age: 60
Discharge: HOME | End: 2025-03-25
Payer: COMMERCIAL

## 2025-03-25 VITALS
OXYGEN SATURATION: 98 % | RESPIRATION RATE: 18 BRPM | SYSTOLIC BLOOD PRESSURE: 153 MMHG | BODY MASS INDEX: 31.83 KG/M2 | HEIGHT: 68 IN | DIASTOLIC BLOOD PRESSURE: 93 MMHG | HEART RATE: 59 BPM | TEMPERATURE: 96.7 F | WEIGHT: 210 LBS

## 2025-03-25 DIAGNOSIS — K91.1 DUMPING SYNDROME: Primary | ICD-10-CM

## 2025-03-25 PROCEDURE — C1894 INTRO/SHEATH, NON-LASER: HCPCS

## 2025-03-25 PROCEDURE — C1751 CATH, INF, PER/CENT/MIDLINE: HCPCS

## 2025-03-25 RX ORDER — SODIUM CHLORIDE 9 MG/ML
40 INJECTION, SOLUTION INTRAVENOUS AS NEEDED
Status: DISCONTINUED | OUTPATIENT
Start: 2025-03-25 | End: 2025-03-27 | Stop reason: HOSPADM

## 2025-03-25 RX ORDER — SODIUM CHLORIDE 0.9 % (FLUSH) 0.9 %
20 SYRINGE (ML) INJECTION AS NEEDED
Status: DISCONTINUED | OUTPATIENT
Start: 2025-03-25 | End: 2025-03-27 | Stop reason: HOSPADM

## 2025-03-25 RX ORDER — SODIUM CHLORIDE 0.9 % (FLUSH) 0.9 %
10 SYRINGE (ML) INJECTION EVERY 12 HOURS SCHEDULED
Status: DISCONTINUED | OUTPATIENT
Start: 2025-03-25 | End: 2025-03-27 | Stop reason: HOSPADM

## 2025-03-25 RX ORDER — MYCOPHENOLATE MOFETIL 500 MG/1
1000 TABLET ORAL 2 TIMES DAILY
COMMUNITY

## 2025-03-25 RX ORDER — SODIUM CHLORIDE 0.9 % (FLUSH) 0.9 %
10 SYRINGE (ML) INJECTION AS NEEDED
Status: DISCONTINUED | OUTPATIENT
Start: 2025-03-25 | End: 2025-03-27 | Stop reason: HOSPADM

## 2025-03-25 NOTE — PROGRESS NOTES
Pt discharged from ir dept s/p picc line placement.  Pt tolerated procedure without complications. Education provided by [facility] nurses. Pt left unit ambulatory.

## (undated) DEVICE — COVADERM: Brand: DEROYAL

## (undated) DEVICE — PK CATH CARD 10

## (undated) DEVICE — SUT PDS 1 CTX 36IN VIO PDP371T

## (undated) DEVICE — SUT VIC 12X27 D8116 BX/12

## (undated) DEVICE — APPL CHLORAPREP W/ALC 26ML CLR

## (undated) DEVICE — PINNACLE INTRODUCER SHEATH: Brand: PINNACLE

## (undated) DEVICE — GLV SURG SENSICARE MICRO PF LF 8.5 STRL

## (undated) DEVICE — TOTAL TRAY, 16FR 10ML SIL FOLEY, URN: Brand: MEDLINE

## (undated) DEVICE — ANGIO-SEAL VIP VASCULAR CLOSURE DEVICE: Brand: ANGIO-SEAL

## (undated) DEVICE — CLTH CLENS READYCLEANSE PERI CARE PK/5

## (undated) DEVICE — GW PERIPH VASC ADX J/TP SS .035 150CM 3MM

## (undated) DEVICE — CATH DIAG EXPO M/ PK 6FR FL4/FR4 PIG 3PK

## (undated) DEVICE — KT MANIFOLD CATHLAB CUST